# Patient Record
Sex: FEMALE | Race: WHITE | Employment: FULL TIME | URBAN - METROPOLITAN AREA
[De-identification: names, ages, dates, MRNs, and addresses within clinical notes are randomized per-mention and may not be internally consistent; named-entity substitution may affect disease eponyms.]

---

## 2017-03-06 ENCOUNTER — HOSPITAL ENCOUNTER (EMERGENCY)
Age: 57
Discharge: HOME | End: 2017-03-06
Attending: INTERNAL MEDICINE
Payer: COMMERCIAL

## 2017-03-06 ENCOUNTER — APPOINTMENT (OUTPATIENT)
Dept: GENERAL RADIOLOGY | Age: 57
End: 2017-03-06
Attending: INTERNAL MEDICINE
Payer: COMMERCIAL

## 2017-03-06 VITALS
DIASTOLIC BLOOD PRESSURE: 65 MMHG | OXYGEN SATURATION: 99 % | WEIGHT: 130 LBS | HEIGHT: 67 IN | BODY MASS INDEX: 20.4 KG/M2 | TEMPERATURE: 97.4 F | HEART RATE: 61 BPM | RESPIRATION RATE: 16 BRPM | SYSTOLIC BLOOD PRESSURE: 105 MMHG

## 2017-03-06 DIAGNOSIS — R07.9 CHEST PAIN, UNSPECIFIED TYPE: Primary | ICD-10-CM

## 2017-03-06 LAB
ALBUMIN SERPL BCP-MCNC: 4 G/DL (ref 3.4–5)
ALBUMIN/GLOB SERPL: 1.2 {RATIO} (ref 0.8–1.7)
ALP SERPL-CCNC: 104 U/L (ref 45–117)
ALT SERPL-CCNC: 34 U/L (ref 13–56)
ANION GAP BLD CALC-SCNC: 6 MMOL/L (ref 3–18)
APPEARANCE UR: ABNORMAL
AST SERPL W P-5'-P-CCNC: 22 U/L (ref 15–37)
BASOPHILS # BLD AUTO: 0 K/UL (ref 0–0.06)
BASOPHILS # BLD: 0 % (ref 0–2)
BILIRUB SERPL-MCNC: 0.4 MG/DL (ref 0.2–1)
BILIRUB UR QL: NEGATIVE
BUN SERPL-MCNC: 15 MG/DL (ref 7–18)
BUN/CREAT SERPL: 18 (ref 12–20)
CALCIUM SERPL-MCNC: 9.1 MG/DL (ref 8.5–10.1)
CHLORIDE SERPL-SCNC: 104 MMOL/L (ref 100–108)
CK MB CFR SERPL CALC: 1.3 % (ref 0–4)
CK MB CFR SERPL CALC: NORMAL % (ref 0–4)
CK MB SERPL-MCNC: 1.2 NG/ML (ref 5–25)
CK MB SERPL-MCNC: <1 NG/ML (ref 5–25)
CK SERPL-CCNC: 81 U/L (ref 26–192)
CK SERPL-CCNC: 93 U/L (ref 26–192)
CO2 SERPL-SCNC: 34 MMOL/L (ref 21–32)
COLOR UR: ABNORMAL
CREAT SERPL-MCNC: 0.85 MG/DL (ref 0.6–1.3)
D DIMER PPP FEU-MCNC: 0.31 UG/ML(FEU)
DIFFERENTIAL METHOD BLD: NORMAL
EOSINOPHIL # BLD: 0.1 K/UL (ref 0–0.4)
EOSINOPHIL NFR BLD: 1 % (ref 0–5)
ERYTHROCYTE [DISTWIDTH] IN BLOOD BY AUTOMATED COUNT: 13 % (ref 11.6–14.5)
GLOBULIN SER CALC-MCNC: 3.4 G/DL (ref 2–4)
GLUCOSE SERPL-MCNC: 82 MG/DL (ref 74–99)
GLUCOSE UR STRIP.AUTO-MCNC: NEGATIVE MG/DL
HCT VFR BLD AUTO: 42.8 % (ref 35–45)
HGB BLD-MCNC: 14.3 G/DL (ref 12–16)
HGB UR QL STRIP: NEGATIVE
KETONES UR QL STRIP.AUTO: NEGATIVE MG/DL
LEUKOCYTE ESTERASE UR QL STRIP.AUTO: NEGATIVE
LYMPHOCYTES # BLD AUTO: 31 % (ref 21–52)
LYMPHOCYTES # BLD: 2.2 K/UL (ref 0.9–3.6)
MCH RBC QN AUTO: 30.7 PG (ref 24–34)
MCHC RBC AUTO-ENTMCNC: 33.4 G/DL (ref 31–37)
MCV RBC AUTO: 91.8 FL (ref 74–97)
MONOCYTES # BLD: 0.3 K/UL (ref 0.05–1.2)
MONOCYTES NFR BLD AUTO: 4 % (ref 3–10)
NEUTS SEG # BLD: 4.5 K/UL (ref 1.8–8)
NEUTS SEG NFR BLD AUTO: 64 % (ref 40–73)
NITRITE UR QL STRIP.AUTO: NEGATIVE
PH UR STRIP: 8.5 [PH] (ref 5–8)
PLATELET # BLD AUTO: 256 K/UL (ref 135–420)
PMV BLD AUTO: 10 FL (ref 9.2–11.8)
POTASSIUM SERPL-SCNC: 3.6 MMOL/L (ref 3.5–5.5)
PROT SERPL-MCNC: 7.4 G/DL (ref 6.4–8.2)
PROT UR STRIP-MCNC: NEGATIVE MG/DL
RBC # BLD AUTO: 4.66 M/UL (ref 4.2–5.3)
SODIUM SERPL-SCNC: 144 MMOL/L (ref 136–145)
SP GR UR REFRACTOMETRY: 1.01 (ref 1–1.03)
TROPONIN I SERPL-MCNC: <0.02 NG/ML (ref 0–0.06)
TROPONIN I SERPL-MCNC: <0.02 NG/ML (ref 0–0.06)
UROBILINOGEN UR QL STRIP.AUTO: 0.2 EU/DL (ref 0.2–1)
WBC # BLD AUTO: 7.1 K/UL (ref 4.6–13.2)

## 2017-03-06 PROCEDURE — 85025 COMPLETE CBC W/AUTO DIFF WBC: CPT | Performed by: INTERNAL MEDICINE

## 2017-03-06 PROCEDURE — 71010 XR CHEST PORT: CPT

## 2017-03-06 PROCEDURE — 93005 ELECTROCARDIOGRAM TRACING: CPT

## 2017-03-06 PROCEDURE — 80053 COMPREHEN METABOLIC PANEL: CPT | Performed by: INTERNAL MEDICINE

## 2017-03-06 PROCEDURE — 99285 EMERGENCY DEPT VISIT HI MDM: CPT

## 2017-03-06 PROCEDURE — 85379 FIBRIN DEGRADATION QUANT: CPT | Performed by: PHYSICIAN ASSISTANT

## 2017-03-06 PROCEDURE — 82550 ASSAY OF CK (CPK): CPT | Performed by: INTERNAL MEDICINE

## 2017-03-06 PROCEDURE — 81003 URINALYSIS AUTO W/O SCOPE: CPT | Performed by: PHYSICIAN ASSISTANT

## 2017-03-06 RX ORDER — RANITIDINE HCL 75 MG
75 TABLET ORAL 2 TIMES DAILY
Status: ON HOLD | COMMUNITY
End: 2019-11-26

## 2017-03-06 RX ORDER — CEPHALEXIN 500 MG/1
500 CAPSULE ORAL 4 TIMES DAILY
Status: ON HOLD | COMMUNITY
End: 2017-12-05

## 2017-03-06 RX ORDER — LANOLIN ALCOHOL/MO/W.PET/CERES
1000 CREAM (GRAM) TOPICAL DAILY
COMMUNITY

## 2017-03-06 NOTE — ED NOTES
I have reviewed discharge instructions with the patient. The patient verbalized understanding.   Patient armband removed and shredded, no scripts given

## 2017-03-06 NOTE — ED TRIAGE NOTES
Pt states chest pain since Dec. Pt has been seen by a cardiologist, Dr. Leia Denton in Jan. Pt states test done and negative results, pt states she has had cath with stents, pt states pain is persistent. C/o sob, denies diaphoresis, nausea   Sepsis Screening completed    (  )Patient meets SIRS criteria. (  x)Patient does not meet SIRS criteria.       SIRS Criteria is achieved when two or more of the following are present   Temperature < 96.8°F (36°C) or > 100.9°F (38.3°C)   Heart Rate > 90 beats per minute   Respiratory Rate > 20 breaths per minute   WBC count > 12,000 or <4,000 or > 10% bands

## 2017-03-06 NOTE — DISCHARGE INSTRUCTIONS
Chest Pain: Care Instructions  Your Care Instructions  There are many things that can cause chest pain. Some are not serious and will get better on their own in a few days. But some kinds of chest pain need more testing and treatment. Your doctor may have recommended a follow-up visit in the next 8 to 12 hours. If you are not getting better, you may need more tests or treatment. Even though your doctor has released you, you still need to watch for any problems. The doctor carefully checked you, but sometimes problems can develop later. If you have new symptoms or if your symptoms do not get better, get medical care right away. If you have worse or different chest pain or pressure that lasts more than 5 minutes or you passed out (lost consciousness), call 911 or seek other emergency help right away. A medical visit is only one step in your treatment. Even if you feel better, you still need to do what your doctor recommends, such as going to all suggested follow-up appointments and taking medicines exactly as directed. This will help you recover and help prevent future problems. How can you care for yourself at home? · Rest until you feel better. · Take your medicine exactly as prescribed. Call your doctor if you think you are having a problem with your medicine. · Do not drive after taking a prescription pain medicine. When should you call for help? Call 911 if:  · You passed out (lost consciousness). · You have severe difficulty breathing. · You have symptoms of a heart attack. These may include:  ¨ Chest pain or pressure, or a strange feeling in your chest.  ¨ Sweating. ¨ Shortness of breath. ¨ Nausea or vomiting. ¨ Pain, pressure, or a strange feeling in your back, neck, jaw, or upper belly or in one or both shoulders or arms. ¨ Lightheadedness or sudden weakness. ¨ A fast or irregular heartbeat.   After you call 911, the  may tell you to chew 1 adult-strength or 2 to 4 low-dose aspirin. Wait for an ambulance. Do not try to drive yourself. Call your doctor today if:  · You have any trouble breathing. · Your chest pain gets worse. · You are dizzy or lightheaded, or you feel like you may faint. · You are not getting better as expected. · You are having new or different chest pain. Where can you learn more? Go to http://virla-anna.info/. Enter A120 in the search box to learn more about \"Chest Pain: Care Instructions. \"  Current as of: May 27, 2016  Content Version: 11.1  © 8381-4306 [employer]. Care instructions adapted under license by [employer] (which disclaims liability or warranty for this information). If you have questions about a medical condition or this instruction, always ask your healthcare professional. [address] any warranty or liability for your use of this information.

## 2017-03-06 NOTE — ED PROVIDER NOTES
[address]  EMERGENCY DEPARTMENT HISTORY AND PHYSICAL EXAM       Date: 3/6/2017   Patient Name: Lisandra Berman   YOB: 1960  Medical Record Number: [id number]    History of Presenting Illness     Chief Complaint   Patient presents with    Chest Pain        History Provided By:  patient    Additional History:   12:28 PM   Lisandra Berman is a 49 y.o. female with a hx of GERD, depression, and CAD who presents to the emergency department c/o intermittent generalized chest pain that intermittently radiates into her arms (no specific pattern; not always the same; episodes last a few minutes) onset 4 months ago. Pain is exacerbated by deep breathing. Associated symptoms include SOB and nausea. She states she is followed by Devi Bourgeois MD (Cardiologist) and had a nuclear stress test and cardiogram 3 months ago (negative, per pt). Pt reports she has an appointment in 4 days with her cardiologist. PMHx of cardiac stents (60% blocked in 2011 by Dr. Desirae Rodgers). She states she has relief with NTG and has tried Tylenol. Pt also c/o left lower back pain s/p hitting it on a marble cabinet. She states she was evaluated for this at an urgent care, had an x-ray done, and it was negative. FHx of MI (mother at 45 y/o). Pt denies cough, congestion, fever, any urinary sx, hx of DM, hx of HTN, and any other symptoms or complaints.       Primary Care Provider: Paige Pedro MD   Specialist:    Past History     Past Medical History:   Past Medical History:   Diagnosis Date    CAD (coronary artery disease)     Chest pain, unspecified 9/9/2012    Coronary atherosclerosis of native coronary artery     Depression     Epistaxis 12/11/2012    Fatigue 12/11/2012    GERD (gastroesophageal reflux disease)     Ill-defined condition     allergies    Ill-defined condition     anal mole and cyst    Left arm pain 8/7/2014    Nausea & vomiting     Other and unspecified hyperlipidemia 6/1/2011  Other and unspecified hyperlipidemia     Ovarian cyst         Past Surgical History:   Past Surgical History:   Procedure Laterality Date    HX APPENDECTOMY      HX BREAST BIOPSY      right cyst    HX CHOLECYSTECTOMY      HX CORONARY STENT PLACEMENT  05/05/11    stent mid right coronary artery stenosis    HX GYN      dysplasia    HX HEART CATHETERIZATION  05/11    stent placement mid coronary artery    HX HYSTERECTOMY      HX KNEE ARTHROSCOPY      left    HX OOPHORECTOMY      HX TONSILLECTOMY          Family History:   Family History   Problem Relation Age of Onset    Coronary Artery Disease Mother 46    Heart Attack Mother 46        Social History:   Social History   Substance Use Topics    Smoking status: Former Smoker     Packs/day: 0.80     Years: 30.00     Types: Cigarettes     Quit date: 5/9/2011    Smokeless tobacco: Never Used    Alcohol use No      Comment: ross yearly        Allergies: Allergies   Allergen Reactions    Zocor [Simvastatin] Hives    Biaxin [Clarithromycin] Other (comments)     [place]      Ciprofloxacin Rash    Pcn [Penicillins] Rash    Plavix [Clopidogrel] Rash    Wellbutrin [Bupropion Hcl] Other (comments)     Whelps            Review of Systems   Review of Systems   Constitutional: Negative for fever. HENT: Negative for congestion. Respiratory: Positive for shortness of breath. Negative for cough. Cardiovascular: Positive for chest pain. Gastrointestinal: Positive for nausea. Genitourinary: Negative. Musculoskeletal: Positive for back pain (lower left). All other systems reviewed and are negative. Physical Exam  Vitals:    03/06/17 1055 03/06/17 1236 03/06/17 1400   BP: 114/64 110/70    Pulse: 67 (!) 56 (!) 55   Resp: 16 10 15   Temp: 97.4 °F (36.3 °C)     SpO2: 98% 100% 97%   Weight: 59 kg (130 lb)     Height: 5' 7\" (1.702 m)         Physical Exam   Constitutional: She is oriented to person, place, and time.  She appears well-developed and well-nourished. No distress. HENT:   Head: Normocephalic and atraumatic. Eyes: Conjunctivae and EOM are normal. Pupils are equal, round, and reactive to light. Neck: Normal range of motion. Neck supple. Cardiovascular: Normal rate and regular rhythm. Pulmonary/Chest: Effort normal and breath sounds normal. She has no wheezes. Abdominal: Soft. Bowel sounds are normal. She exhibits no distension. There is no tenderness. There is no rebound, no guarding and no CVA tenderness. Musculoskeletal: Normal range of motion. Neurological: She is alert and oriented to person, place, and time. Skin: Skin is warm and dry. No abrasion, no burn and no rash noted. Psychiatric: She has a normal mood and affect. Her behavior is normal.   Nursing note and vitals reviewed. Diagnostic Study Results     Labs -      Recent Results (from the past 12 hour(s))   EKG, 12 LEAD, INITIAL    Collection Time: 03/06/17 11:02 AM   Result Value Ref Range    Ventricular Rate 61 BPM    Atrial Rate 61 BPM    P-R Interval 158 ms    QRS Duration 84 ms    Q-T Interval 402 ms    QTC Calculation (Bezet) 404 ms    Calculated P Axis 71 degrees    Calculated R Axis 78 degrees    Calculated T Axis 72 degrees    Diagnosis       Normal sinus rhythm  Possible Left atrial enlargement  Cannot rule out Anterior infarct , age undetermined  Abnormal ECG  When compared with ECG of 24-JUL-2014 10:57,  No significant change was found     CBC WITH AUTOMATED DIFF    Collection Time: 03/06/17 11:49 AM   Result Value Ref Range    WBC 7.1 4.6 - 13.2 K/uL    RBC 4.66 4.20 - 5.30 M/uL    HGB 14.3 12.0 - 16.0 g/dL    HCT 42.8 35.0 - 45.0 %    MCV 91.8 74.0 - 97.0 FL    MCH 30.7 24.0 - 34.0 PG    MCHC 33.4 31.0 - 37.0 g/dL    RDW 13.0 11.6 - 14.5 %    PLATELET 452 262 - 401 K/uL    MPV 10.0 9.2 - 11.8 FL    NEUTROPHILS 64 40 - 73 %    LYMPHOCYTES 31 21 - 52 %    MONOCYTES 4 3 - 10 %    EOSINOPHILS 1 0 - 5 %    BASOPHILS 0 0 - 2 %    ABS.  NEUTROPHILS 4.5 1.8 - 8.0 K/UL    ABS. LYMPHOCYTES 2.2 0.9 - 3.6 K/UL    ABS. MONOCYTES 0.3 0.05 - 1.2 K/UL    ABS. EOSINOPHILS 0.1 0.0 - 0.4 K/UL    ABS. BASOPHILS 0.0 0.0 - 0.06 K/UL    DF AUTOMATED     METABOLIC PANEL, COMPREHENSIVE    Collection Time: 03/06/17 11:49 AM   Result Value Ref Range    Sodium 144 136 - 145 mmol/L    Potassium 3.6 3.5 - 5.5 mmol/L    Chloride 104 100 - 108 mmol/L    CO2 34 (H) 21 - 32 mmol/L    Anion gap 6 3.0 - 18 mmol/L    Glucose 82 74 - 99 mg/dL    BUN 15 7.0 - 18 MG/DL    Creatinine 0.85 0.6 - 1.3 MG/DL    BUN/Creatinine ratio 18 12 - 20      GFR est AA >60 >60 ml/min/1.73m2    GFR est non-AA >60 >60 ml/min/1.73m2    Calcium 9.1 8.5 - 10.1 MG/DL    Bilirubin, total 0.4 0.2 - 1.0 MG/DL    ALT (SGPT) 34 13 - 56 U/L    AST (SGOT) 22 15 - 37 U/L    Alk.  phosphatase 104 45 - 117 U/L    Protein, total 7.4 6.4 - 8.2 g/dL    Albumin 4.0 3.4 - 5.0 g/dL    Globulin 3.4 2.0 - 4.0 g/dL    A-G Ratio 1.2 0.8 - 1.7     CARDIAC PANEL,(CK, CKMB & TROPONIN)    Collection Time: 03/06/17 11:49 AM   Result Value Ref Range    CK 93 26 - 192 U/L    CK - MB 1.2 <3.6 ng/ml    CK-MB Index 1.3 0.0 - 4.0 %    Troponin-I, Qt. <0.02 0.00 - 0.06 NG/ML   D DIMER    Collection Time: 03/06/17 11:49 AM   Result Value Ref Range    D DIMER 0.31 <0.46 ug/ml(FEU)   URINALYSIS W/ RFLX MICROSCOPIC    Collection Time: 03/06/17 12:50 PM   Result Value Ref Range    Color ORANGE     Appearance CLOUDY      Specific gravity 1.012 1.005 - 1.030      pH (UA) 8.5 (H) 5.0 - 8.0      Protein NEGATIVE  NEG mg/dL    Glucose NEGATIVE  NEG mg/dL    Ketone NEGATIVE  NEG mg/dL    Bilirubin NEGATIVE  NEG      Blood NEGATIVE  NEG      Urobilinogen 0.2 0.2 - 1.0 EU/dL    Nitrites NEGATIVE  NEG      Leukocyte Esterase NEGATIVE  NEG     CARDIAC PANEL,(CK, CKMB & TROPONIN)    Collection Time: 03/06/17  2:57 PM   Result Value Ref Range    CK 81 26 - 192 U/L    CK - MB <1.0 <3.6 ng/ml    CK-MB Index Cannot be calulated 0.0 - 4.0 %    Troponin-I, Qt. <0.02 0.00 - 0.06 NG/ML       Radiologic Studies -  XR CHEST PORT   Final Result   Impression:     No acute cardiopulmonary disease. As read by the radiologist.            Medical Decision Making   I am the first provider for this patient. I reviewed the vital signs, available nursing notes, past medical history, past surgical history, family history and social history. Vital Signs-Reviewed the patient's vital signs. Patient Vitals for the past 12 hrs:   Temp Pulse Resp BP SpO2   03/06/17 1400 - (!) 55 15 - 97 %   03/06/17 1236 - (!) 56 10 110/70 100 %   03/06/17 1055 97.4 °F (36.3 °C) 67 16 114/64 98 %       Pulse Oximetry Analysis - Normal 98% on RA     Cardiac Monitor:   Rate: 52 bpm  Rhythm: Sinus bradycardia     EKG interpretation: (Preliminary)  11:02  NSR, 61 bpm, negative STEMI, possible left atrial enlargement, cannot rule out anterior infarct, age undetermined  EKG read by Leopold Rouse, MD     Old Medical Records: Nursing notes. Provider Notes:   50 y/o female c/o intermittent chest pain that is pleuritic in nature and is worse with inspiration with occassional SOB. She is not tachycardic or hypoxic and has no other risk factors for PE. Will obtain D-Dimer. Low pre-test probability. Medications Given in the ED:  Medications - No data to display     PROGRESS NOTE:  12:28 PM   Initial assessment performed. CONSULT NOTE:   2:51 PM   Fredo Nguyễn PA-C  spoke with Baylee Roy MD via telephone consult  Specialty: Cardiology  Discussed pt's hx, disposition, available diagnostic, and imaging results. Reviewed care plans. Consulting physician agrees with plans as outlined. He suggests repeat cardiac panel now and if normal, pt may be discharged and seen in his office tomorrow. Discharge Note:  3:40 PM   Pt has been reexamined. Patient has no new complaints, changes, or physical findings. Care plan outlined and precautions discussed. Results were reviewed with the patient.  All medications were reviewed with the patient; will d/c home. All of pt's questions and concerns were addressed. Patient was instructed and agrees to follow up with cardiology, as well as to return to the ED upon further deterioration. Patient is ready to go home. Diagnosis   Clinical Impression:   1. Chest pain, unspecified type         Discussion:    Follow-up Information     Follow up With Details Comments [address], MD Call Follow up with cardiology [address]  [address] [facility] EMERGENCY DEPT  As needed, If symptoms worsen [address] [phone]  [phone]          Current Discharge Medication List      CONTINUE these medications which have NOT CHANGED    Details   raNITIdine (ZANTAC 75) 75 mg tablet Take 75 mg by mouth two (2) times a day. cyanocobalamin 1,000 mcg tablet Take 1,000 mcg by mouth daily. cephALEXin (KEFLEX) 500 mg capsule Take 500 mg by mouth four (4) times daily. furosemide (LASIX) 20 mg tablet take 1 tablet by mouth once daily  Qty: 90 tablet, Refills: 3      atenolol (TENORMIN) 25 mg tablet TAKE 1/2 TABLET BY MOUTH EVERY DAY  Qty: 90 tablet, Refills: 3      nitroglycerin (NITROSTAT) 0.4 mg SL tablet 1 tablet by SubLINGual route every five (5) minutes as needed for Chest Pain (total of 3 tabs for one episode of pain). Qty: 75 tablet, Refills: 3      rosuvastatin (CRESTOR) 10 mg tablet Take 1 tablet by mouth daily. Qty: 90 tablet, Refills: 3      Cholecalciferol, Vitamin D3, 50,000 unit cap Take  by mouth every Monday. montelukast (SINGULAIR) 10 mg tablet Take 10 mg by mouth daily. desloratadine (CLARINEX) 5 mg disintegrating tablet Take 5 mg by mouth daily. azelastine (ASTELIN) 137 mcg nasal spray 1 Spray two (2) times a day. Use in each nostril as directed       LORazepam (ATIVAN) 0.5 mg tablet Take  by mouth nightly.       potassium chloride SR (KLOR-CON 10) 10 mEq tablet Take 10 mEq by mouth daily. aspirin 81 mg tablet Take 81 mg by mouth daily. _______________________________   Attestations: This note is prepared by Jovani Hernandez, acting as a Scribe for Fabien Stewart PA-C on 12:27 PM on 3/6/2017 . Fabien Stewart PA-C: The scribe's documentation has been prepared under my direction and personally reviewed by me in its entirety.   _______________________________

## 2017-03-16 LAB
ATRIAL RATE: 61 BPM
CALCULATED P AXIS, ECG09: 71 DEGREES
CALCULATED R AXIS, ECG10: 78 DEGREES
CALCULATED T AXIS, ECG11: 72 DEGREES
DIAGNOSIS, 93000: NORMAL
P-R INTERVAL, ECG05: 158 MS
Q-T INTERVAL, ECG07: 402 MS
QRS DURATION, ECG06: 84 MS
QTC CALCULATION (BEZET), ECG08: 404 MS
VENTRICULAR RATE, ECG03: 61 BPM

## 2017-12-01 ENCOUNTER — HOSPITAL ENCOUNTER (OUTPATIENT)
Dept: LAB | Age: 57
Discharge: HOME | End: 2017-12-01
Payer: COMMERCIAL

## 2017-12-01 LAB
ALBUMIN SERPL-MCNC: 3.7 G/DL (ref 3.4–5)
ALBUMIN/GLOB SERPL: 1.2 {RATIO} (ref 0.8–1.7)
ALP SERPL-CCNC: 112 U/L (ref 45–117)
ALT SERPL-CCNC: 30 U/L (ref 13–56)
ANION GAP SERPL CALC-SCNC: 8 MMOL/L (ref 3–18)
AST SERPL-CCNC: 19 U/L (ref 15–37)
BASOPHILS # BLD: 0.1 K/UL (ref 0–0.06)
BASOPHILS NFR BLD: 1 % (ref 0–2)
BILIRUB SERPL-MCNC: 0.3 MG/DL (ref 0.2–1)
BUN SERPL-MCNC: 16 MG/DL (ref 7–18)
BUN/CREAT SERPL: 20 (ref 12–20)
CALCIUM SERPL-MCNC: 9.7 MG/DL (ref 8.5–10.1)
CHLORIDE SERPL-SCNC: 104 MMOL/L (ref 100–108)
CO2 SERPL-SCNC: 32 MMOL/L (ref 21–32)
CREAT SERPL-MCNC: 0.82 MG/DL (ref 0.6–1.3)
DIFFERENTIAL METHOD BLD: ABNORMAL
EOSINOPHIL # BLD: 2.1 K/UL (ref 0–0.4)
EOSINOPHIL NFR BLD: 23 % (ref 0–5)
ERYTHROCYTE [DISTWIDTH] IN BLOOD BY AUTOMATED COUNT: 13.3 % (ref 11.6–14.5)
GLOBULIN SER CALC-MCNC: 3.1 G/DL (ref 2–4)
GLUCOSE SERPL-MCNC: 88 MG/DL (ref 74–99)
HCT VFR BLD AUTO: 44.1 % (ref 35–45)
HGB BLD-MCNC: 14.8 G/DL (ref 12–16)
INR PPP: 1 (ref 0.8–1.2)
LYMPHOCYTES # BLD: 2.1 K/UL (ref 0.9–3.6)
LYMPHOCYTES NFR BLD: 23 % (ref 21–52)
MCH RBC QN AUTO: 30.6 PG (ref 24–34)
MCHC RBC AUTO-ENTMCNC: 33.6 G/DL (ref 31–37)
MCV RBC AUTO: 91.1 FL (ref 74–97)
MONOCYTES # BLD: 0.3 K/UL (ref 0.05–1.2)
MONOCYTES NFR BLD: 3 % (ref 3–10)
NEUTS BAND NFR BLD MANUAL: 1 %
NEUTS SEG # BLD: 4.5 K/UL (ref 1.8–8)
NEUTS SEG NFR BLD: 49 % (ref 40–73)
PLATELET # BLD AUTO: 264 K/UL (ref 135–420)
PMV BLD AUTO: 10.8 FL (ref 9.2–11.8)
POTASSIUM SERPL-SCNC: 3.9 MMOL/L (ref 3.5–5.5)
PROT SERPL-MCNC: 6.8 G/DL (ref 6.4–8.2)
PROTHROMBIN TIME: 12.6 SEC (ref 11.5–15.2)
RBC # BLD AUTO: 4.84 M/UL (ref 4.2–5.3)
RBC MORPH BLD: ABNORMAL
SODIUM SERPL-SCNC: 144 MMOL/L (ref 136–145)
WBC # BLD AUTO: 9.2 K/UL (ref 4.6–13.2)
WBC MORPH BLD: ABNORMAL

## 2017-12-01 PROCEDURE — 80053 COMPREHEN METABOLIC PANEL: CPT | Performed by: INTERNAL MEDICINE

## 2017-12-01 PROCEDURE — 85025 COMPLETE CBC W/AUTO DIFF WBC: CPT | Performed by: INTERNAL MEDICINE

## 2017-12-01 PROCEDURE — 85610 PROTHROMBIN TIME: CPT | Performed by: INTERNAL MEDICINE

## 2017-12-01 PROCEDURE — 36415 COLL VENOUS BLD VENIPUNCTURE: CPT | Performed by: INTERNAL MEDICINE

## 2017-12-04 RX ORDER — DOCUSATE SODIUM 100 MG/1
100 CAPSULE, LIQUID FILLED ORAL 2 TIMES DAILY
COMMUNITY
End: 2019-03-31

## 2017-12-04 RX ORDER — FLUTICASONE PROPIONATE 50 MCG
2 SPRAY, SUSPENSION (ML) NASAL DAILY
COMMUNITY
End: 2021-06-12

## 2017-12-05 ENCOUNTER — HOSPITAL ENCOUNTER (OUTPATIENT)
Dept: CARDIAC CATH/INVASIVE PROCEDURES | Age: 57
Discharge: HOME | End: 2017-12-05
Attending: INTERNAL MEDICINE | Admitting: INTERNAL MEDICINE
Payer: COMMERCIAL

## 2017-12-05 VITALS
BODY MASS INDEX: 20.33 KG/M2 | HEIGHT: 67 IN | SYSTOLIC BLOOD PRESSURE: 104 MMHG | OXYGEN SATURATION: 100 % | WEIGHT: 129.5 LBS | HEART RATE: 55 BPM | RESPIRATION RATE: 18 BRPM | DIASTOLIC BLOOD PRESSURE: 54 MMHG | TEMPERATURE: 98.2 F

## 2017-12-05 LAB
ACT BLD: 177 SECS (ref 79–138)
FAX TO INFO,FAXT: NORMAL
FAX TO NUMBER,FAXN: NORMAL

## 2017-12-05 PROCEDURE — 77030010221 CARDIAC CATHETERIZATION

## 2017-12-05 PROCEDURE — 74011636320 HC RX REV CODE- 636/320: Performed by: INTERNAL MEDICINE

## 2017-12-05 PROCEDURE — 74011000250 HC RX REV CODE- 250

## 2017-12-05 PROCEDURE — 74011250636 HC RX REV CODE- 250/636: Performed by: INTERNAL MEDICINE

## 2017-12-05 PROCEDURE — 85347 COAGULATION TIME ACTIVATED: CPT

## 2017-12-05 PROCEDURE — 74011000250 HC RX REV CODE- 250: Performed by: INTERNAL MEDICINE

## 2017-12-05 PROCEDURE — 74011250636 HC RX REV CODE- 250/636

## 2017-12-05 RX ORDER — HEPARIN SODIUM 200 [USP'U]/100ML
500 INJECTION, SOLUTION INTRAVENOUS
Status: DISCONTINUED | OUTPATIENT
Start: 2017-12-05 | End: 2017-12-05 | Stop reason: HOSPADM

## 2017-12-05 RX ORDER — FENTANYL CITRATE 50 UG/ML
INJECTION, SOLUTION INTRAMUSCULAR; INTRAVENOUS
Status: COMPLETED
Start: 2017-12-05 | End: 2017-12-05

## 2017-12-05 RX ORDER — SODIUM CHLORIDE 9 MG/ML
75 INJECTION, SOLUTION INTRAVENOUS CONTINUOUS
Status: DISCONTINUED | OUTPATIENT
Start: 2017-12-05 | End: 2017-12-05 | Stop reason: HOSPADM

## 2017-12-05 RX ORDER — ADENOSINE 3 MG/ML
140 INJECTION, SOLUTION INTRAVENOUS ONCE
Status: COMPLETED | OUTPATIENT
Start: 2017-12-05 | End: 2017-12-05

## 2017-12-05 RX ORDER — ADENOSINE 3 MG/ML
INJECTION, SOLUTION INTRAVENOUS
Status: DISCONTINUED
Start: 2017-12-05 | End: 2017-12-05 | Stop reason: HOSPADM

## 2017-12-05 RX ORDER — VERAPAMIL HYDROCHLORIDE 2.5 MG/ML
INJECTION, SOLUTION INTRAVENOUS
Status: DISCONTINUED
Start: 2017-12-05 | End: 2017-12-05 | Stop reason: HOSPADM

## 2017-12-05 RX ORDER — MIDAZOLAM HYDROCHLORIDE 1 MG/ML
.5-2 INJECTION, SOLUTION INTRAMUSCULAR; INTRAVENOUS
Status: DISCONTINUED | OUTPATIENT
Start: 2017-12-05 | End: 2017-12-05 | Stop reason: HOSPADM

## 2017-12-05 RX ORDER — HEPARIN SODIUM 1000 [USP'U]/ML
INJECTION, SOLUTION INTRAVENOUS; SUBCUTANEOUS
Status: COMPLETED
Start: 2017-12-05 | End: 2017-12-05

## 2017-12-05 RX ORDER — FENTANYL CITRATE 50 UG/ML
25-100 INJECTION, SOLUTION INTRAMUSCULAR; INTRAVENOUS
Status: DISCONTINUED | OUTPATIENT
Start: 2017-12-05 | End: 2017-12-05 | Stop reason: HOSPADM

## 2017-12-05 RX ORDER — HEPARIN SODIUM 200 [USP'U]/100ML
INJECTION, SOLUTION INTRAVENOUS
Status: COMPLETED
Start: 2017-12-05 | End: 2017-12-05

## 2017-12-05 RX ORDER — SODIUM CHLORIDE 9 MG/ML
75 INJECTION, SOLUTION INTRAVENOUS CONTINUOUS
Status: DISPENSED | OUTPATIENT
Start: 2017-12-05 | End: 2017-12-05

## 2017-12-05 RX ORDER — LIDOCAINE HYDROCHLORIDE 10 MG/ML
3-20 INJECTION INFILTRATION; PERINEURAL
Status: DISCONTINUED | OUTPATIENT
Start: 2017-12-05 | End: 2017-12-05 | Stop reason: HOSPADM

## 2017-12-05 RX ORDER — HEPARIN SODIUM 1000 [USP'U]/ML
1000-4000 INJECTION, SOLUTION INTRAVENOUS; SUBCUTANEOUS
Status: DISCONTINUED | OUTPATIENT
Start: 2017-12-05 | End: 2017-12-05 | Stop reason: HOSPADM

## 2017-12-05 RX ORDER — EPTIFIBATIDE 0.75 MG/ML
2 INJECTION, SOLUTION INTRAVENOUS CONTINUOUS
Status: DISCONTINUED | OUTPATIENT
Start: 2017-12-05 | End: 2017-12-05 | Stop reason: HOSPADM

## 2017-12-05 RX ORDER — SODIUM CHLORIDE 0.9 % (FLUSH) 0.9 %
5-10 SYRINGE (ML) INJECTION AS NEEDED
Status: DISCONTINUED | OUTPATIENT
Start: 2017-12-05 | End: 2017-12-05 | Stop reason: HOSPADM

## 2017-12-05 RX ORDER — SODIUM CHLORIDE 0.9 % (FLUSH) 0.9 %
5-10 SYRINGE (ML) INJECTION EVERY 8 HOURS
Status: DISCONTINUED | OUTPATIENT
Start: 2017-12-05 | End: 2017-12-05 | Stop reason: HOSPADM

## 2017-12-05 RX ORDER — MIDAZOLAM HYDROCHLORIDE 1 MG/ML
INJECTION, SOLUTION INTRAMUSCULAR; INTRAVENOUS
Status: COMPLETED
Start: 2017-12-05 | End: 2017-12-05

## 2017-12-05 RX ORDER — EPTIFIBATIDE 2 MG/ML
180 INJECTION, SOLUTION INTRAVENOUS
Status: DISCONTINUED | OUTPATIENT
Start: 2017-12-05 | End: 2017-12-05 | Stop reason: HOSPADM

## 2017-12-05 RX ORDER — LIDOCAINE HYDROCHLORIDE 10 MG/ML
INJECTION INFILTRATION; PERINEURAL
Status: COMPLETED
Start: 2017-12-05 | End: 2017-12-05

## 2017-12-05 RX ADMIN — HEPARIN SODIUM 1000 UNITS: 1000 INJECTION, SOLUTION INTRAVENOUS; SUBCUTANEOUS at 13:44

## 2017-12-05 RX ADMIN — MIDAZOLAM HYDROCHLORIDE 1 MG: 1 INJECTION, SOLUTION INTRAMUSCULAR; INTRAVENOUS at 13:06

## 2017-12-05 RX ADMIN — FENTANYL CITRATE 50 MCG: 50 INJECTION, SOLUTION INTRAMUSCULAR; INTRAVENOUS at 13:06

## 2017-12-05 RX ADMIN — HEPARIN SODIUM 2000 UNITS: 1000 INJECTION, SOLUTION INTRAVENOUS; SUBCUTANEOUS at 14:03

## 2017-12-05 RX ADMIN — FENTANYL CITRATE 50 MCG: 50 INJECTION, SOLUTION INTRAMUSCULAR; INTRAVENOUS at 13:26

## 2017-12-05 RX ADMIN — SODIUM CHLORIDE 75 ML/HR: 900 INJECTION, SOLUTION INTRAVENOUS at 11:28

## 2017-12-05 RX ADMIN — ADENOSINE 8.22 MG/MIN: 3 INJECTION, SOLUTION INTRAVENOUS at 14:01

## 2017-12-05 RX ADMIN — HEPARIN SODIUM 2000 UNITS: 200 INJECTION, SOLUTION INTRAVENOUS at 13:28

## 2017-12-05 RX ADMIN — HEPARIN SODIUM 4000 UNITS: 1000 INJECTION, SOLUTION INTRAVENOUS; SUBCUTANEOUS at 13:00

## 2017-12-05 RX ADMIN — FENTANYL CITRATE 100 MCG: 50 INJECTION, SOLUTION INTRAMUSCULAR; INTRAVENOUS at 13:00

## 2017-12-05 RX ADMIN — HEPARIN SODIUM 3000 UNITS: 1000 INJECTION, SOLUTION INTRAVENOUS; SUBCUTANEOUS at 13:27

## 2017-12-05 RX ADMIN — VERAPAMIL HYDROCHLORIDE 3 ML: 2.5 INJECTION INTRAVENOUS at 13:00

## 2017-12-05 RX ADMIN — LIDOCAINE HYDROCHLORIDE 3 ML: 10 INJECTION, SOLUTION INFILTRATION; PERINEURAL at 13:00

## 2017-12-05 RX ADMIN — IOPAMIDOL 130 ML: 612 INJECTION, SOLUTION INTRAVENOUS at 14:11

## 2017-12-05 RX ADMIN — MIDAZOLAM HYDROCHLORIDE 2 MG: 1 INJECTION, SOLUTION INTRAMUSCULAR; INTRAVENOUS at 13:01

## 2017-12-05 RX ADMIN — SODIUM CHLORIDE 75 ML/HR: 900 INJECTION, SOLUTION INTRAVENOUS at 13:23

## 2017-12-05 RX ADMIN — MIDAZOLAM HYDROCHLORIDE 1 MG: 1 INJECTION, SOLUTION INTRAMUSCULAR; INTRAVENOUS at 13:27

## 2017-12-05 RX ADMIN — LIDOCAINE HYDROCHLORIDE 3 ML: 10 INJECTION INFILTRATION; PERINEURAL at 13:00

## 2017-12-05 NOTE — IP AVS SNAPSHOT
Linda Del Angel 
 
 
 [address] [address] 
[phone] Patient: Marilyn Moreno MRN: [id number] Gigi Erickson About your hospitalization You were admitted on:  December 5, 2017 You last received care in the:  [address] You were discharged on:  December 5, 2017 Why you were hospitalized Your primary diagnosis was:  Not on File Things You Need To Do (next 8 weeks) Follow up with Nadege Meyers MD  
  
Phone:  [phone] Where:  [address] [facility], [address] [address] Follow up with Hebert Saenz MD in 2 week(s)  
follow up as instructed Phone:  [phone] Where:  [address] [facility], [address] [facility], [address] [facility] Discharge Orders None A check alba indicates which time of day the medication should be taken. My Medications TAKE these medications as instructed Instructions Each Dose to Equal  
 Morning Noon Evening Bedtime  
 aspirin 81 mg tablet Your last dose was: Your next dose is: Take 81 mg by mouth daily. 81 mg ASTELIN 137 mcg (0.1 %) nasal spray Generic drug:  azelastine Your last dose was: Your next dose is:    
   
   
 1 Spray two (2) times a day. Use in each nostril as directed 1 Spray  
    
   
   
   
  
 atenolol 25 mg tablet Commonly known as:  TENORMIN Your last dose was: Your next dose is: TAKE 1/2 TABLET BY MOUTH EVERY DAY  
     
   
   
   
  
 ATIVAN 0.5 mg tablet Generic drug:  LORazepam  
   
Your last dose was: Your next dose is: Take  by mouth nightly. Cholecalciferol (Vitamin D3) 50,000 unit Cap Your last dose was: Your next dose is: Take  by mouth every Monday. CLARINEX 5 mg disintegrating tablet Generic drug:  desloratadine Your last dose was: Your next dose is: Take 5 mg by mouth daily. 5 mg  
    
   
   
   
  
 cyanocobalamin 1,000 mcg tablet Your last dose was: Your next dose is: Take 1,000 mcg by mouth daily. 1000 mcg  
    
   
   
   
  
 docusate sodium 100 mg capsule Commonly known as:  Dwight Ana Your last dose was: Your next dose is: Take 100 mg by mouth two (2) times a day. 100 mg FLONASE ALLERGY RELIEF 50 mcg/actuation nasal spray Generic drug:  fluticasone Your last dose was: Your next dose is: 2 Sprays by Both Nostrils route daily. 2 Spray  
    
   
   
   
  
 furosemide 20 mg tablet Commonly known as:  LASIX Your last dose was: Your next dose is:    
   
   
 take 1 tablet by mouth once daily  
     
   
   
   
  
 nitroglycerin 0.4 mg SL tablet Commonly known as:  NITROSTAT Your last dose was: Your next dose is:    
   
   
 1 tablet by SubLINGual route every five (5) minutes as needed for Chest Pain (total of 3 tabs for one episode of pain). 0.4 mg  
    
   
   
   
  
 potassium chloride SR 10 mEq tablet Commonly known as:  KLOR-CON 10 Your last dose was: Your next dose is: Take 10 mEq by mouth daily. 10 mEq  
    
   
   
   
  
 rosuvastatin 10 mg tablet Commonly known as:  CRESTOR Your last dose was: Your next dose is: Take 1 tablet by mouth daily. 10 mg  
    
   
   
   
  
 SINGULAIR 10 mg tablet Generic drug:  montelukast  
   
Your last dose was: Your next dose is: Take 10 mg by mouth daily. 10 mg  
    
   
   
   
  
 ZANTAC 75 75 mg tablet Generic drug:  raNITIdine Your last dose was: Your next dose is: Take 75 mg by mouth two (2) times a day. 75 mg Discharge Instructions Cardiac Catheterization/Angiography Discharge Instructions *Check the puncture site frequently for swelling or bleeding. If you see any bleeding, lie down and apply pressure over the area with a clean town or washcloth. Notify your doctor for any redness, swelling, drainage or oozing from the puncture site. Notify your doctor for any fever or chills. *If the leg or arm with the puncture becomes cold, numb or painful, call Dr Bermudez Most *Activity should be limited for the next 48 hours. Climb stairs as little as possible and avoid any stooping, bending or strenuous activity for 48 hours. No heavy lifting (anything over 10 pounds) for three days. *Do not drive for 48 hours. *You may resume your usual diet. Drink more fluids than usual. 
 
*Have a responsible person drive you home and stay with you for at least 24 hours after your heart catheterization/angiography. *You may remove the bandage from your {ARM/GROIN:73891} in 24 hours. You may shower in 24 hours. No tub baths, hot tubs or swimming for one week. Do not place any lotions, creams, powders, ointments over the puncture site for one week. You may place a clean band-aid over the puncture site each day for 5 days. Change this daily. Sedation for a Medical Procedure: Care Instructions Your Care Instructions For a minor procedure or surgery, you will get a sedative to help you relax. This drug will make you sleepy. It is usually given in a vein (by IV). A shot may also be used to numb the area. If you had local anesthesia, you may feel some pain and discomfort as it wears off. If you have pain, don't be afraid to say so. Pain medicine works better if you take it before the pain gets bad. Common side effects from sedation include: · Feeling sleepy. (Your doctors and nurses will make sure you are not too sleepy to go home.) · Nausea and vomiting. This usually does not last long. · Feeling tired. Follow-up care is a key part of your treatment and safety. Be sure to make and go to all appointments, and call your doctor if you are having problems. It's also a good idea to know your test results and keep a list of the medicines you take. How can you care for yourself at home? Activity ? · Don't do anything for 24 hours that requires attention to detail. It takes time for the medicine effects to completely wear off.  
? · For your safety, you should not drive or operate any machinery that could be dangerous until the medicine wears off and you can think clearly and react easily. ? · Rest when you feel tired. Getting enough sleep will help you recover. Diet ? · You can eat your normal diet, unless your doctor gives you other instructions. If your stomach is upset, try clear liquids and bland, low-fat foods like plain toast or rice. ? · Drink plenty of fluids (unless your doctor tells you not to). ? · Don't drink alcohol for 24 hours. Medicines ? · Be safe with medicines. Read and follow all instructions on the label. ¨ If the doctor gave you a prescription medicine for pain, take it as prescribed. ¨ If you are not taking a prescription pain medicine, ask your doctor if you can take an over-the-counter medicine. ? · If you think your pain medicine is making you sick to your stomach: 
¨ Take your medicine after meals (unless your doctor has told you not to). ¨ Ask your doctor for a different pain medicine. When should you call for help? Call 911 anytime you think you may need emergency care. For example, call if: 
? · You have severe trouble breathing. ? · You passed out (lost consciousness). ?Call your doctor now or seek immediate medical care if: 
? · You have trouble breathing. ? · You have ongoing or worsening nausea or vomiting. ? · You have a fever. ? · You have a new or worse headache. ? · The medicine is not wearing off and you can't think clearly. ?Watch closely for changes in your health, and be sure to contact your doctor if: 
? · You do not get better as expected. Where can you learn more? Go to http://viral-anna.info/. Enter P688 in the search box to learn more about \"Sedation for a Medical Procedure: Care Instructions. \" Current as of: August 14, 2016 Content Version: 11.4 © 3560-0681 [employer]. Care instructions adapted under license by [employer] (which disclaims liability or warranty for this information). If you have questions about a medical condition or this instruction, always ask your healthcare professional. [place] [address] any warranty or liability for your use of this information. DISCHARGE SUMMARY from Nurse PATIENT INSTRUCTIONS: 
 
After general anesthesia or intravenous sedation, for 24 hours or while taking prescription Narcotics: · Limit your activities · Do not drive and operate hazardous machinery · Do not make important personal or business decisions · Do  not drink alcoholic beverages · If you have not urinated within 8 hours after discharge, please contact your surgeon on call. Report the following to your surgeon: 
· Excessive pain, swelling, redness or odor of or around the surgical area · Temperature over 100.5 · Nausea and vomiting lasting longer than 4 hours or if unable to take medications · Any signs of decreased circulation or nerve impairment to extremity: change in color, persistent  numbness, tingling, coldness or increase pain · Any questions What to do at Home: 
Recommended activity: Activity as tolerated, *  Please give a list of your current medications to your Primary Care Provider. *  Please update this list whenever your medications are discontinued, doses are 
    changed, or new medications (including over-the-counter products) are added.  
 
*  Please carry medication information at all times in case of emergency situations. These are general instructions for a healthy lifestyle: No smoking/ No tobacco products/ Avoid exposure to second hand smoke Surgeon General's Warning:  Quitting smoking now greatly reduces serious risk to your health. Obesity, smoking, and sedentary lifestyle greatly increases your risk for illness A healthy diet, regular physical exercise & weight monitoring are important for maintaining a healthy lifestyle You may be retaining fluid if you have a history of heart failure or if you experience any of the following symptoms:  Weight gain of 3 pounds or more overnight or 5 pounds in a week, increased swelling in our hands or feet or shortness of breath while lying flat in bed. Please call your doctor as soon as you notice any of these symptoms; do not wait until your next office visit. Recognize signs and symptoms of STROKE: 
 
F-face looks uneven A-arms unable to move or move unevenly S-speech slurred or non-existent T-time-call 911 as soon as signs and symptoms begin-DO NOT go Back to bed or wait to see if you get better-TIME IS BRAIN. Warning Signs of HEART ATTACK Call 911 if you have these symptoms: 
? Chest discomfort. Most heart attacks involve discomfort in the center of the chest that lasts more than a few minutes, or that goes away and comes back. It can feel like uncomfortable pressure, squeezing, fullness, or pain. ? Discomfort in other areas of the upper body. Symptoms can include pain or discomfort in one or both arms, the back, neck, jaw, or stomach. ? Shortness of breath with or without chest discomfort. ? Other signs may include breaking out in a cold sweat, nausea, or lightheadedness. Don't wait more than five minutes to call [address]! Fast action can save your life. Calling 911 is almost always the fastest way to get lifesaving treatment.  Emergency Medical Services staff can begin treatment when they arrive  up to an hour sooner than if someone gets to the hospital by car. The discharge information has been reviewed with the patient. The patient verbalized understanding. Discharge medications reviewed with the patient and guardian and appropriate educational materials and side effects teaching were provided. ___________________________________________________ Patient armband removed and shredded 
________________________________________________________________________________ Conferize Announcement We are excited to announce that we are making your provider's discharge notes available to you in Conferize. You will see these notes when they are completed and signed by the physician that discharged you from your recent hospital stay. If you have any questions or concerns about any information you see in Conferize, please call the Health Information Department where you were seen or reach out to your Primary Care Provider for more information about your plan of care. Introducing [facility]! Dear Ese Arias: Thank you for requesting a Conferize account. Our records indicate that you already have an active Conferize account. You can access your account anytime at [email or web address] Did you know that you can access your hospital and ER discharge instructions at any time in Conferize? You can also review all of your test results from your hospital stay or ER visit. Additional Information If you have questions, please visit the Frequently Asked Questions section of the Conferize website at [email or web address] Remember, Conferize is NOT to be used for urgent needs. For medical emergencies, dial 911. Now available from your iPhone and Android! Providers Seen During Your Hospitalization Provider Specialty Primary office phone Fran Velarde MD Cardiology [phone] Your Primary Care Physician (PCP) Primary Care Physician Office Phone Office Fax Domingo Castro [phone] You are allergic to the following Allergen Reactions Zocor (Simvastatin) Hives Biaxin (Clarithromycin) Other (comments) Whelps Ciprofloxacin Rash Pcn (Penicillins) Rash Plavix (Clopidogrel) Rash Wellbutrin (Bupropion Hcl) Other (comments) Whelps Recent Documentation Height Weight Breastfeeding? BMI OB Status Smoking Status 1.702 m 58.7 kg No 20.28 kg/m2 Hysterectomy Former Smoker Emergency Contacts Name Discharge Info Relation Home Work Mobile KumarBrandy N/A  AT THIS TIME [6] Mother [14] [phone] [address] CAREGIVER [3] Other Relative [6] [phone] Patient Belongings The following personal items are in your possession at time of discharge: 
     Visual Aid: Glasses, With patient Please provide this summary of care documentation to your next provider. Signatures-by signing, you are acknowledging that this After Visit Summary has been reviewed with you and you have received a copy. Patient Signature:  ____________________________________________________________ Date:  ____________________________________________________________  
  
Rikki Hamlin Provider Signature:  ____________________________________________________________ Date:  ____________________________________________________________

## 2017-12-05 NOTE — DISCHARGE SUMMARY
Discharge Summary    Patient: Mery Kumar MRN: [id number]  CSN: [id number]    YOB: 1960  Age: 56 y.o. Sex: female    DOA: 12/5/2017 LOS:  LOS: 0 days   Discharge Date:      Primary Care Provider:  Dulce Wu MD    Admission Diagnoses: CAD with angina  Discharge Diagnoses:  CAD  Problem List as of 12/5/2017  Date Reviewed: 8/27/2014          Codes Class Noted - Resolved    Chest pain ICD-10-CM: R07.9  ICD-9-CM: 786.50  8/27/2014 - Present        Left arm pain ICD-10-CM: M79.602  ICD-9-CM: 729.5  8/7/2014 - Present        Epistaxis ICD-10-CM: R04.0  ICD-9-CM: 784.7  12/11/2012 - Present        Fatigue ICD-10-CM: R53.83  ICD-9-CM: 780.79  12/11/2012 - Present        Chest pain, unspecified ICD-10-CM: R07.9  ICD-9-CM: 786.50  9/9/2012 - Present        Other and unspecified hyperlipidemia ICD-10-CM: E78.5  ICD-9-CM: 272.4  6/1/2011 - Present        Coronary atherosclerosis of native coronary artery ICD-10-CM: I25.10  ICD-9-CM: 414.01  Unknown - Present        GERD (gastroesophageal reflux disease) ICD-10-CM: K21.9  ICD-9-CM: 530.81  Unknown - Present        Depression ICD-10-CM: F32.9  ICD-9-CM: 311  Unknown - Present              Discharge Medications:     Current Discharge Medication List      CONTINUE these medications which have NOT CHANGED    Details   docusate sodium (COLACE) 100 mg capsule Take 100 mg by mouth two (2) times a day. raNITIdine (ZANTAC 75) 75 mg tablet Take 75 mg by mouth two (2) times a day. cyanocobalamin 1,000 mcg tablet Take 1,000 mcg by mouth daily. furosemide (LASIX) 20 mg tablet take 1 tablet by mouth once daily  Qty: 90 tablet, Refills: 3      atenolol (TENORMIN) 25 mg tablet TAKE 1/2 TABLET BY MOUTH EVERY DAY  Qty: 90 tablet, Refills: 3      rosuvastatin (CRESTOR) 10 mg tablet Take 1 tablet by mouth daily. Qty: 90 tablet, Refills: 3      montelukast (SINGULAIR) 10 mg tablet Take 10 mg by mouth daily.       desloratadine (CLARINEX) 5 mg disintegrating tablet Take 5 mg by mouth daily. LORazepam (ATIVAN) 0.5 mg tablet Take  by mouth nightly. potassium chloride SR (KLOR-CON 10) 10 mEq tablet Take 10 mEq by mouth daily. aspirin 81 mg tablet Take 81 mg by mouth daily. fluticasone (FLONASE ALLERGY RELIEF) 50 mcg/actuation nasal spray 2 Sprays by Both Nostrils route daily. nitroglycerin (NITROSTAT) 0.4 mg SL tablet 1 tablet by SubLINGual route every five (5) minutes as needed for Chest Pain (total of 3 tabs for one episode of pain). Qty: 75 tablet, Refills: 3      Cholecalciferol, Vitamin D3, 50,000 unit cap Take  by mouth every Monday. azelastine (ASTELIN) 137 mcg nasal spray 1 Spray two (2) times a day. Use in each nostril as directed              Discharge Condition: Stable    Procedures : LHC and coronary angiogram     Consults: None       PHYSICAL EXAM   Visit Vitals    /64 (BP 1 Location: Right arm, BP Patient Position: At rest;Supine)    Pulse 61    Temp 97.9 °F (36.6 °C)    Resp 17    Ht 5' 7\" (1.702 m)    Wt 58.7 kg (129 lb 8 oz)    SpO2 100%    Breastfeeding No    BMI 20.28 kg/m2     General: Awake, cooperative, no acute distress    HEENT: NC, Atraumatic. PERRLA, EOMI. Anicteric sclerae. Lungs:  CTA Bilaterally. No Wheezing/Rhonchi/Rales. Heart:  Regular  rhythm,  No murmur, No Rubs, No Gallops  Abdomen: Soft, Non distended, Non tender. +Bowel sounds,   Extremities: No c/c/e  Psych:   Not anxious or agitated. Neurologic:  No acute neurological deficits. Admission HPI : See H/P    Hospital Course :              LHC and coronary angiogram done via left radial artery. Left main is normal. Mid LAD has patent stent. There is 40-50% stenosis in mid LAD just prior to the stent. Mid LCx has segmental 70-75% stenosis. Mid RCA has patent stent.     iFR of mLcx is 1.0  FFR of mLCx is 0.90      Patient tolerated procedure. Medical management.              Activity: No weight lifting not more than 5 lbs for 1 week from left hand     Diet: Cardiac     Follow-up: In cardiology clinic after 2 weeks    Disposition: Home    Minutes spent on discharge: 40 minutes       Labs: Results:       Chemistry No results for input(s): GLU, NA, K, CL, CO2, BUN, CREA, CA, AGAP, BUCR, TBIL, GPT, AP, TP, ALB, GLOB, AGRAT in the last 72 hours. CBC w/Diff No results for input(s): WBC, RBC, HGB, HCT, PLT, GRANS, LYMPH, EOS, HGBEXT, HCTEXT, PLTEXT in the last 72 hours. Cardiac Enzymes No results for input(s): CPK, CKND1, AMRIT in the last 72 hours. No lab exists for component: CKRMB, TROIP   Coagulation No results for input(s): PTP, INR, APTT in the last 72 hours. No lab exists for component: INREXT    Lipid Panel Lab Results   Component Value Date/Time    Cholesterol, total CANCELED 11/01/2012 12:07 PM    HDL Cholesterol CANCELED 11/01/2012 12:07 PM    LDL, calculated 84 08/08/2012 08:58 AM    VLDL, calculated 21 08/08/2012 08:58 AM    Triglyceride CANCELED 11/01/2012 12:07 PM    CHOL/HDL Ratio 2.8 03/14/2012 03:07 AM      BNP No results for input(s): BNPP in the last 72 hours. Liver Enzymes No results for input(s): TP, ALB, TBIL, AP, SGOT, GPT in the last 72 hours. No lab exists for component: DBIL   Thyroid Studies Lab Results   Component Value Date/Time    TSH 1.25 02/09/2012 09:10 AM            Significant Diagnostic Studies: No results found. No results found for this or any previous visit.         CC: Irvin Kohler MD

## 2017-12-05 NOTE — ROUTINE PROCESS
Cardiac Cath Lab:  Pre Procedure Chart Check     Patients chart was accessed and reviewed for possible and/or scheduled procedure. Creatinine Clearance:  CREATININE: 0.82 MG/DL (12/01/17 1450)  Estimated creatinine clearance: 71.8 mL/min    Total Contrast  Load:  3 x estimated clearance amount= 215.4   ml    75% of Contrast Load:  0.75 x Total Contrast Load=     161.55   ml    No results for input(s): WBC, RBC, HCT, HGB, PLT, INR, APTT, PTP, NA, K, BUN, CREA, GFRAA, GFRNA, CA, MAG, CPK, CKMB, CKNDX, CKND1, TROPT, TROIQ, BNPP, BNP, HCTEXT, HGBEXT, PLTEXT in the last 72 hours. No lab exists for component: DDIMER, GLUCOSE, INREXT    BMI: Body mass index is 20.28 kg/(m^2). ALLERGIES:   Allergies   Allergen Reactions    Zocor [Simvastatin] Hives    Biaxin [Clarithromycin] Other (comments)     Whelps      Ciprofloxacin Rash    Pcn [Penicillins] Rash    Plavix [Clopidogrel] Rash    Wellbutrin [Bupropion Hcl] Other (comments)     Whelps           Lines:        Peripheral IV 12/05/17 Right Forearm (Active)   Site Assessment Clean, dry, & intact 12/5/2017 11:28 AM   Phlebitis Assessment 0 12/5/2017 11:28 AM   Infiltration Assessment 0 12/5/2017 11:28 AM   Dressing Status Clean 12/5/2017 11:28 AM   Dressing Type Tape;Transparent 12/5/2017 11:28 AM   Hub Color/Line Status Pink;Flushed;Capped; Infusing 12/5/2017 11:28 AM   Action Taken Open ports on tubing capped 12/5/2017 11:28 AM   Alcohol Cap Used Yes 12/5/2017 11:28 AM          History:    Past Medical History:   Diagnosis Date    CAD (coronary artery disease)     Chest pain, unspecified 9/9/2012    Coronary atherosclerosis of native coronary artery     Depression     Epistaxis 12/11/2012    Fatigue 12/11/2012    GERD (gastroesophageal reflux disease)     Ill-defined condition     allergies    Ill-defined condition     anal mole and cyst    Left arm pain 8/7/2014    Nausea & vomiting     Other and unspecified hyperlipidemia 6/1/2011    Other and unspecified hyperlipidemia     Ovarian cyst      Past Surgical History:   Procedure Laterality Date    HX APPENDECTOMY      HX BREAST BIOPSY      right cyst    HX CHOLECYSTECTOMY      HX CORONARY STENT PLACEMENT  05/05/11    stent mid right coronary artery stenosis    HX GYN      dysplasia    HX HEART CATHETERIZATION  05/11    stent placement mid coronary artery    HX HYSTERECTOMY      HX KNEE ARTHROSCOPY      left    HX OOPHORECTOMY      HX TONSILLECTOMY       Patient Active Problem List   Diagnosis Code    GERD (gastroesophageal reflux disease) K21.9    Depression F32.9    Coronary atherosclerosis of native coronary artery I25.10    Other and unspecified hyperlipidemia E78.5    Chest pain, unspecified R07.9    Epistaxis R04.0    Fatigue R53.83    Left arm pain M79.602    Chest pain R07.9

## 2017-12-05 NOTE — PROGRESS NOTES
TRANSFER - OUT REPORT:  Verbal report given to [address], RN(name) on Ene Chin being transferred to care(unit) for routine progression of care   Report consisted of patients Situation, Background, Assessment and   Recommendations(SBAR). Information from the following report(s) SBAR, Kardex, Procedure Summary, MAR, Recent Results and Cardiac Rhythm NSR 61bpm was reviewed with the receiving nurse. Cath Lab Report:    Procedure:  [x ] LHC  [ ] RHC  [ ] PTCA   [ ] Peripheral   [ ] Pacemaker [ ] KARIN  [ ] [facility]    Access site:   [ x] Radial     [ ] Brachial     [ ] Femoral    [ ] Jugular   [ ] Chest Wall       Sheath:           [x ] Pulled in Cath Lab   [ ] In place   [ ] To be pulled after:         Closure:          [ ] TR Band x[ ] Radial Band  [ ] Right [ x] Left    [ ] Manual Pressure     [ ] Sheila Rue     [ ] Star Close    [ ] Per Close    [ ] Safe Guard    Site Assessment:   [x ] Clean, Dry, No bleeding    [ ] Minor oozing          [ ] Hematoma: Description:    Stents(s) Placement:  [ ] Left Main:                 [ ] LAD:                [ ] Circ:                [ ] RCA:                [ ] EF:     [ ] Peripheral:      [ ] N/A    Infusion [ ]Angiomax [ ] Integrelin [ ] Heparin d/c'd: Intra procedure Medications:    Fentanyl:200mcg IV  Versed:4mg IV  Other:  Antiplatelet:    Lines:        Peripheral IV 12/05/17 Right Forearm (Active)   Site Assessment Clean, dry, & intact 12/5/2017 11:28 AM   Phlebitis Assessment 0 12/5/2017 11:28 AM   Infiltration Assessment 0 12/5/2017 11:28 AM   Dressing Status Clean 12/5/2017 11:28 AM   Dressing Type Tape;Transparent 12/5/2017 11:28 AM   Hub Color/Line Status Pink;Flushed;Capped; Infusing 12/5/2017 11:28 AM   Action Taken Open ports on tubing capped 12/5/2017 11:28 AM   Alcohol Cap Used Yes 12/5/2017 11:28 AM          Patient Vitals for the past 4 hrs:   Temp Pulse Resp BP SpO2   12/05/17 1411 97.9 °F (36.6 °C) 61 17 114/64 100 %   12/05/17 1118 98.2 °F (36.8 °C) 61 18 104/57 98 %         Extended / Orthostatic Vitals:    Vital Signs  Level of Consciousness: Alert (12/05/17 1411)  Temp: 97.9 °F (36.6 °C) (12/05/17 1411)  Temp Source: Oral (12/05/17 1411)  Pulse (Heart Rate): 61 (12/05/17 1411)  Heart Rate Source: Monitor (12/05/17 1411)  Cardiac Rhythm: Normal sinus rhythm (12/05/17 1411)  Resp Rate: 17 (12/05/17 1411)  BP: 114/64 (12/05/17 1411)  MAP (Calculated): 81 (12/05/17 1411)  BP 1 Location: Right arm (12/05/17 1411)  BP 1 Method: Automatic (12/05/17 1411)  BP Patient Position: At rest;Supine (12/05/17 1411)  MEWS Score: 1 (12/05/17 1411)         Oxygen Therapy  O2 Sat (%): 100 % (12/05/17 1411)  O2 Device: Room air (12/05/17 1118)          Opportunity for questions and clarification was provided.

## 2017-12-05 NOTE — DISCHARGE INSTRUCTIONS
Cardiac Catheterization/Angiography Discharge Instructions    *Check the puncture site frequently for swelling or bleeding. If you see any bleeding, lie down and apply pressure over the area with a clean town or washcloth. Notify your doctor for any redness, swelling, drainage or oozing from the puncture site. Notify your doctor for any fever or chills. *If the leg or arm with the puncture becomes cold, numb or painful, call Dr Ritesh Combs     *Activity should be limited for the next 48 hours. Climb stairs as little as possible and avoid any stooping, bending or strenuous activity for 48 hours. No heavy lifting (anything over 10 pounds) for three days. *Do not drive for 48 hours. *You may resume your usual diet. Drink more fluids than usual.    *Have a responsible person drive you home and stay with you for at least 24 hours after your heart catheterization/angiography. *You may remove the bandage from your Left and Arm in 24 hours. You may shower in 24 hours. No tub baths, hot tubs or swimming for one week. Do not place any lotions, creams, powders, ointments over the puncture site for one week. You may place a clean band-aid over the puncture site each day for 5 days. Change this daily. Sedation for a Medical Procedure: Care Instructions  Your Care Instructions    For a minor procedure or surgery, you will get a sedative to help you relax. This drug will make you sleepy. It is usually given in a vein (by IV). A shot may also be used to numb the area. If you had local anesthesia, you may feel some pain and discomfort as it wears off. If you have pain, don't be afraid to say so. Pain medicine works better if you take it before the pain gets bad. Common side effects from sedation include:  · Feeling sleepy. (Your doctors and nurses will make sure you are not too sleepy to go home.)  · Nausea and vomiting. This usually does not last long. · Feeling tired.   Follow-up care is a key part of your treatment and safety. Be sure to make and go to all appointments, and call your doctor if you are having problems. It's also a good idea to know your test results and keep a list of the medicines you take. How can you care for yourself at home? Activity  ? · Don't do anything for 24 hours that requires attention to detail. It takes time for the medicine effects to completely wear off.   ? · For your safety, you should not drive or operate any machinery that could be dangerous until the medicine wears off and you can think clearly and react easily. ? · Rest when you feel tired. Getting enough sleep will help you recover. Diet  ? · You can eat your normal diet, unless your doctor gives you other instructions. If your stomach is upset, try clear liquids and bland, low-fat foods like plain toast or rice. ? · Drink plenty of fluids (unless your doctor tells you not to). ? · Don't drink alcohol for 24 hours. Medicines  ? · Be safe with medicines. Read and follow all instructions on the label. ¨ If the doctor gave you a prescription medicine for pain, take it as prescribed. ¨ If you are not taking a prescription pain medicine, ask your doctor if you can take an over-the-counter medicine. ? · If you think your pain medicine is making you sick to your stomach:  ¨ Take your medicine after meals (unless your doctor has told you not to). ¨ Ask your doctor for a different pain medicine. When should you call for help? Call 911 anytime you think you may need emergency care. For example, call if:  ? · You have severe trouble breathing. ? · You passed out (lost consciousness). ?Call your doctor now or seek immediate medical care if:  ? · You have trouble breathing. ? · You have ongoing or worsening nausea or vomiting. ? · You have a fever. ? · You have a new or worse headache. ? · The medicine is not wearing off and you can't think clearly. ? Watch closely for changes in your health, and be sure to contact your doctor if:  ? · You do not get better as expected. Where can you learn more? Go to http://viral-anna.info/. Enter D811 in the search box to learn more about \"Sedation for a Medical Procedure: Care Instructions. \"  Current as of: August 14, 2016  Content Version: 11.4  © 5036-9316 [employer]. Care instructions adapted under license by [employer] (which disclaims liability or warranty for this information). If you have questions about a medical condition or this instruction, always ask your healthcare professional. [place] [address] any warranty or liability for your use of this information. DISCHARGE SUMMARY from Nurse    PATIENT INSTRUCTIONS:    After general anesthesia or intravenous sedation, for 24 hours or while taking prescription Narcotics:  · Limit your activities  · Do not drive and operate hazardous machinery  · Do not make important personal or business decisions  · Do  not drink alcoholic beverages  · If you have not urinated within 8 hours after discharge, please contact your surgeon on call. Report the following to your surgeon:  · Excessive pain, swelling, redness or odor of or around the surgical area  · Temperature over 100.5  · Nausea and vomiting lasting longer than 4 hours or if unable to take medications  · Any signs of decreased circulation or nerve impairment to extremity: change in color, persistent  numbness, tingling, coldness or increase pain  · Any questions    What to do at Home:  Recommended activity: Activity as tolerated,       *  Please give a list of your current medications to your Primary Care Provider. *  Please update this list whenever your medications are discontinued, doses are      changed, or new medications (including over-the-counter products) are added. *  Please carry medication information at all times in case of emergency situations.     These are general instructions for a healthy lifestyle:    No smoking/ No tobacco products/ Avoid exposure to second hand smoke  Surgeon General's Warning:  Quitting smoking now greatly reduces serious risk to your health. Obesity, smoking, and sedentary lifestyle greatly increases your risk for illness    A healthy diet, regular physical exercise & weight monitoring are important for maintaining a healthy lifestyle    You may be retaining fluid if you have a history of heart failure or if you experience any of the following symptoms:  Weight gain of 3 pounds or more overnight or 5 pounds in a week, increased swelling in our hands or feet or shortness of breath while lying flat in bed. Please call your doctor as soon as you notice any of these symptoms; do not wait until your next office visit. Recognize signs and symptoms of STROKE:    F-face looks uneven    A-arms unable to move or move unevenly    S-speech slurred or non-existent    T-time-call 911 as soon as signs and symptoms begin-DO NOT go       Back to bed or wait to see if you get better-TIME IS BRAIN. Warning Signs of HEART ATTACK     Call 911 if you have these symptoms:   Chest discomfort. Most heart attacks involve discomfort in the center of the chest that lasts more than a few minutes, or that goes away and comes back. It can feel like uncomfortable pressure, squeezing, fullness, or pain.  Discomfort in other areas of the upper body. Symptoms can include pain or discomfort in one or both arms, the back, neck, jaw, or stomach.  Shortness of breath with or without chest discomfort.  Other signs may include breaking out in a cold sweat, nausea, or lightheadedness. Don't wait more than five minutes to call 911 - MINUTES MATTER! Fast action can save your life. Calling 911 is almost always the fastest way to get lifesaving treatment. Emergency Medical Services staff can begin treatment when they arrive -- up to an hour sooner than if someone gets to the hospital by car. The discharge information has been reviewed with the patient. The patient verbalized understanding. Discharge medications reviewed with the patient and guardian and appropriate educational materials and side effects teaching were provided.   ___________________________________________________  Patient armband removed and shredded  ________________________________________________________________________________

## 2017-12-05 NOTE — PROGRESS NOTES
Pt is all prepped and ready for procedure. 1320 Pt back to care unit S/P cardiac cath. TR band to Lt wrist. Procedure tolerated well    1430 Attempted to remove band, noted bleeding,    1630 TR band removed and some hematoma noted and wrapped with coban, immobilized back in place. 1740 Discharge instructions given to pt and friend and they verbalized all understandings. Pt escorted to car and left in stable condition with no c/o pain.

## 2017-12-05 NOTE — H&P
Date of Surgery Update:  Bharti Arango was seen and examined. History and physical has been reviewed. The patient has been examined.  There have been no significant clinical changes since the completion of the originally dated History and Physical.    Signed By: Chelsea Mckeon MD     December 5, 2017 12:40 PM

## 2017-12-05 NOTE — PROCEDURES
LHC and coronary angiogram done via left radial artery. Left main is normal. Mid LAD has patent stent. There is 40-50% stenosis in mid LAD just prior to the stent. Mid LCx has segmental 70-75% stenosis. Mid RCA has patent stent. iFR of mLcx is 1.0  FFR of mLCx is 0.90     Patient tolerated procedure. Medical management.

## 2018-12-26 ENCOUNTER — APPOINTMENT (OUTPATIENT)
Dept: GENERAL RADIOLOGY | Age: 58
End: 2018-12-26
Attending: EMERGENCY MEDICINE
Payer: COMMERCIAL

## 2018-12-26 ENCOUNTER — HOSPITAL ENCOUNTER (EMERGENCY)
Age: 58
Discharge: HOME | End: 2018-12-27
Attending: EMERGENCY MEDICINE
Payer: COMMERCIAL

## 2018-12-26 DIAGNOSIS — R07.9 CHEST PAIN, UNSPECIFIED TYPE: Primary | ICD-10-CM

## 2018-12-26 DIAGNOSIS — I25.118 ATHEROSCLEROSIS OF NATIVE CORONARY ARTERY OF NATIVE HEART WITH STABLE ANGINA PECTORIS (HCC): ICD-10-CM

## 2018-12-26 LAB
ALBUMIN SERPL-MCNC: 4 G/DL (ref 3.4–5)
ALBUMIN/GLOB SERPL: 1.3 {RATIO} (ref 0.8–1.7)
ALP SERPL-CCNC: 116 U/L (ref 45–117)
ALT SERPL-CCNC: 31 U/L (ref 13–56)
AMORPH CRY URNS QL MICRO: ABNORMAL
ANION GAP SERPL CALC-SCNC: 5 MMOL/L (ref 3–18)
APPEARANCE UR: ABNORMAL
AST SERPL-CCNC: 21 U/L (ref 15–37)
BACTERIA URNS QL MICRO: ABNORMAL /HPF
BASOPHILS # BLD: 0 K/UL (ref 0–0.1)
BASOPHILS NFR BLD: 1 % (ref 0–2)
BILIRUB SERPL-MCNC: 0.2 MG/DL (ref 0.2–1)
BILIRUB UR QL: NEGATIVE
BUN SERPL-MCNC: 15 MG/DL (ref 7–18)
BUN/CREAT SERPL: 18
CALCIUM SERPL-MCNC: 8.9 MG/DL (ref 8.5–10.1)
CHLORIDE SERPL-SCNC: 104 MMOL/L (ref 100–108)
CK MB CFR SERPL CALC: 1.7 % (ref 0–4)
CK MB SERPL-MCNC: 2.5 NG/ML (ref 5–25)
CK SERPL-CCNC: 145 U/L (ref 26–192)
CO2 SERPL-SCNC: 33 MMOL/L (ref 21–32)
COLOR UR: YELLOW
CREAT SERPL-MCNC: 0.84 MG/DL (ref 0.6–1.3)
DIFFERENTIAL METHOD BLD: NORMAL
EOSINOPHIL # BLD: 0.1 K/UL (ref 0–0.4)
EOSINOPHIL NFR BLD: 2 % (ref 0–5)
EPITH CASTS URNS QL MICRO: ABNORMAL /LPF (ref 0–5)
ERYTHROCYTE [DISTWIDTH] IN BLOOD BY AUTOMATED COUNT: 13.8 % (ref 11.6–14.5)
GLOBULIN SER CALC-MCNC: 3 G/DL (ref 2–4)
GLUCOSE SERPL-MCNC: 90 MG/DL (ref 74–99)
GLUCOSE UR STRIP.AUTO-MCNC: NEGATIVE MG/DL
HCT VFR BLD AUTO: 42.1 % (ref 35–45)
HGB BLD-MCNC: 13.4 G/DL (ref 12–16)
HGB UR QL STRIP: NEGATIVE
KETONES UR QL STRIP.AUTO: NEGATIVE MG/DL
LEUKOCYTE ESTERASE UR QL STRIP.AUTO: ABNORMAL
LYMPHOCYTES # BLD: 2.7 K/UL (ref 0.9–3.6)
LYMPHOCYTES NFR BLD: 34 % (ref 21–52)
MCH RBC QN AUTO: 29.8 PG (ref 24–34)
MCHC RBC AUTO-ENTMCNC: 31.8 G/DL (ref 31–37)
MCV RBC AUTO: 93.6 FL (ref 74–97)
MONOCYTES # BLD: 0.5 K/UL (ref 0.05–1.2)
MONOCYTES NFR BLD: 6 % (ref 3–10)
NEUTS SEG # BLD: 4.5 K/UL (ref 1.8–8)
NEUTS SEG NFR BLD: 57 % (ref 40–73)
NITRITE UR QL STRIP.AUTO: NEGATIVE
PH UR STRIP: 7.5 [PH] (ref 5–8)
PLATELET # BLD AUTO: 263 K/UL (ref 135–420)
PMV BLD AUTO: 10.2 FL (ref 9.2–11.8)
POTASSIUM SERPL-SCNC: 3.6 MMOL/L (ref 3.5–5.5)
PROT SERPL-MCNC: 7 G/DL (ref 6.4–8.2)
PROT UR STRIP-MCNC: NEGATIVE MG/DL
RBC # BLD AUTO: 4.5 M/UL (ref 4.2–5.3)
RBC #/AREA URNS HPF: NEGATIVE /HPF (ref 0–5)
SODIUM SERPL-SCNC: 142 MMOL/L (ref 136–145)
SP GR UR REFRACTOMETRY: 1.01 (ref 1–1.03)
TROPONIN I SERPL-MCNC: <0.02 NG/ML (ref 0–0.04)
UROBILINOGEN UR QL STRIP.AUTO: 0.2 EU/DL (ref 0.2–1)
WBC # BLD AUTO: 7.8 K/UL (ref 4.6–13.2)

## 2018-12-26 PROCEDURE — 93005 ELECTROCARDIOGRAM TRACING: CPT

## 2018-12-26 PROCEDURE — 71045 X-RAY EXAM CHEST 1 VIEW: CPT

## 2018-12-26 PROCEDURE — 80053 COMPREHEN METABOLIC PANEL: CPT

## 2018-12-26 PROCEDURE — 81001 URINALYSIS AUTO W/SCOPE: CPT

## 2018-12-26 PROCEDURE — 99285 EMERGENCY DEPT VISIT HI MDM: CPT

## 2018-12-26 PROCEDURE — 85025 COMPLETE CBC W/AUTO DIFF WBC: CPT

## 2018-12-26 PROCEDURE — 82550 ASSAY OF CK (CPK): CPT

## 2018-12-27 VITALS
TEMPERATURE: 97.9 F | WEIGHT: 130 LBS | SYSTOLIC BLOOD PRESSURE: 110 MMHG | BODY MASS INDEX: 20.89 KG/M2 | RESPIRATION RATE: 14 BRPM | HEIGHT: 66 IN | HEART RATE: 67 BPM | OXYGEN SATURATION: 97 % | DIASTOLIC BLOOD PRESSURE: 61 MMHG

## 2018-12-27 LAB
CK MB CFR SERPL CALC: 2 % (ref 0–4)
CK MB SERPL-MCNC: 2.5 NG/ML (ref 5–25)
CK SERPL-CCNC: 124 U/L (ref 26–192)
TROPONIN I SERPL-MCNC: <0.02 NG/ML (ref 0–0.04)

## 2018-12-27 PROCEDURE — 82550 ASSAY OF CK (CPK): CPT

## 2018-12-27 RX ORDER — METHOCARBAMOL 500 MG/1
1000 TABLET, FILM COATED ORAL 3 TIMES DAILY
Qty: 30 TAB | Refills: 0 | Status: SHIPPED | OUTPATIENT
Start: 2018-12-27 | End: 2019-03-31

## 2018-12-27 NOTE — ED TRIAGE NOTES
Pt arrives ambulatory to ED with c\o CP x 1 month, pt sts she talked to MD Gregoria arango 1 month ago on the phone about pain and was told to come to ED, pt sts she did not want to until now, pt describes pain as fluttery/tightness, pt has hx of 2 stents, pt is able to make needs known speaking in complete sentences, pt in nad at this time

## 2018-12-27 NOTE — ED PROVIDER NOTES
EMERGENCY DEPARTMENT HISTORY AND PHYSICAL EXAM    Date: 12/26/2018  Patient Name: Ludin Lane    History of Presenting Illness     Chief Complaint   Patient presents with    Chest Pain         History Provided By: Patient    Chief Complaint: CP  Duration: 1 month ago  Timing:  Intermittent  Location: chest  Quality: fluttery/tightness/squeezing  Associated Symptoms: \"funny feeling\" in her ears x 2-3 weeks ago and dry cough    Additional History (Context):   10:32 PM   Ludin Lane is a 66 y.o. female with PMHX of CAD and PSHx coronary stents x2 who presents ambulatory to the emergency department C/O central CP lasting a few minutes onset x 1 month raking leaves. Pt describes pain as fluttery/tightness/squeezing. Associated sxs include \"funny feeling\" in her ears x 2-3 weeks ago and dry cough. Pt reports that she pulled a muscle a couple of weeks ago while doing yard work and was seen by her PCP and dx with costochondritis. Pt states that she talked to Dr. Maggie Mc x 1 month ago on the phone about pain and was told to come to ED but did not want to until now. Pt has FHx CAD and MI in mother at 42 y/o and HTN but denies FHx of DM. Pt takes 81 mg Aspirin. Pt is a former smoker but denies leg swelling, nausea, SOB, diaphoresis, H/O MI and any other sxs or complaints. PCP: Yuli Norton MD    Current Outpatient Medications   Medication Sig Dispense Refill    methocarbamol (ROBAXIN) 500 mg tablet Take 2 Tabs by mouth three (3) times daily. 30 Tab 0    docusate sodium (COLACE) 100 mg capsule Take 100 mg by mouth two (2) times a day.  fluticasone (FLONASE ALLERGY RELIEF) 50 mcg/actuation nasal spray 2 Sprays by Both Nostrils route daily.  raNITIdine (ZANTAC 75) 75 mg tablet Take 75 mg by mouth two (2) times a day.  cyanocobalamin 1,000 mcg tablet Take 1,000 mcg by mouth daily.       furosemide (LASIX) 20 mg tablet take 1 tablet by mouth once daily 90 tablet 3    atenolol (TENORMIN) 25 mg tablet TAKE 1/2 TABLET BY MOUTH EVERY DAY 90 tablet 3    nitroglycerin (NITROSTAT) 0.4 mg SL tablet 1 tablet by SubLINGual route every five (5) minutes as needed for Chest Pain (total of 3 tabs for one episode of pain). 75 tablet 3    rosuvastatin (CRESTOR) 10 mg tablet Take 1 tablet by mouth daily. 90 tablet 3    Cholecalciferol, Vitamin D3, 50,000 unit cap Take  by mouth every Monday.  montelukast (SINGULAIR) 10 mg tablet Take 10 mg by mouth daily.  desloratadine (CLARINEX) 5 mg disintegrating tablet Take 5 mg by mouth daily.  azelastine (ASTELIN) 137 mcg nasal spray 1 Spray two (2) times a day. Use in each nostril as directed       LORazepam (ATIVAN) 0.5 mg tablet Take  by mouth nightly.  potassium chloride SR (KLOR-CON 10) 10 mEq tablet Take 10 mEq by mouth daily.  aspirin 81 mg tablet Take 81 mg by mouth daily.          Past History     Past Medical History:  Past Medical History:   Diagnosis Date    CAD (coronary artery disease)     Chest pain, unspecified 9/9/2012    Coronary atherosclerosis of native coronary artery     Depression     Epistaxis 12/11/2012    Fatigue 12/11/2012    GERD (gastroesophageal reflux disease)     Ill-defined condition     allergies    Ill-defined condition     anal mole and cyst    Left arm pain 8/7/2014    Nausea & vomiting     Other and unspecified hyperlipidemia 6/1/2011    Other and unspecified hyperlipidemia     Ovarian cyst        Past Surgical History:  Past Surgical History:   Procedure Laterality Date    HX APPENDECTOMY      HX BREAST BIOPSY      right cyst    HX CHOLECYSTECTOMY      HX CORONARY STENT PLACEMENT  05/05/11    stent mid right coronary artery stenosis    HX GYN      dysplasia    HX HEART CATHETERIZATION  05/11    stent placement mid coronary artery    HX HYSTERECTOMY      HX KNEE ARTHROSCOPY      left    HX OOPHORECTOMY      HX TONSILLECTOMY         Family History:  Family History   Problem Relation Age of Onset    Coronary Artery Disease Mother 46    Heart Attack Mother 46       Social History:  Social History     Tobacco Use    Smoking status: Former Smoker     Packs/day: 0.80     Years: 30.00     Pack years: 24.00     Types: Cigarettes     Last attempt to quit: 2011     Years since quittin.6    Smokeless tobacco: Never Used   Substance Use Topics    Alcohol use: No     Comment: ross yearly    Drug use: No       Allergies: Allergies   Allergen Reactions    Zocor [Simvastatin] Hives    Biaxin [Clarithromycin] Other (comments)     [place]ps      Ciprofloxacin Rash    Pcn [Penicillins] Rash    Plavix [Clopidogrel] Rash    Wellbutrin [Bupropion Hcl] Other (comments)     [place]helps             Review of Systems   Review of Systems   Constitutional: Negative for diaphoresis. HENT: Positive for ear pain. Respiratory: Positive for cough. Negative for shortness of breath. Choking: Dry. Cardiovascular: Positive for chest pain. Negative for leg swelling. Gastrointestinal: Negative for nausea. All other systems reviewed and are negative. Physical Exam     Vitals:    18 2135   BP: 148/69   Pulse: 78   Resp: 16   Temp: 97.9 °F (36.6 °C)   SpO2: 100%   Weight: 59 kg (130 lb)   Height: 5' 6\" (1.676 m)     Physical Exam   Constitutional: She is oriented to person, place, and time. She appears well-developed and well-nourished. No distress. Pleasant, well appearing, nontoxic   HENT:   Head: Normocephalic and atraumatic. Right Ear: External ear normal.   Left Ear: External ear normal.   Mouth/Throat: Oropharynx is clear and moist. No oropharyngeal exudate. Eyes: Conjunctivae and EOM are normal. Pupils are equal, round, and reactive to light. No scleral icterus. Neck: Normal range of motion. Neck supple. No JVD present. No tracheal deviation present. No thyromegaly present. Cardiovascular: Normal rate, regular rhythm and normal heart sounds.    Pulmonary/Chest: Effort normal and breath sounds normal. No stridor. No respiratory distress. She exhibits tenderness. TTP to lower chest wall   Abdominal: Soft. Bowel sounds are normal. She exhibits no distension. There is no tenderness. There is no rebound and no guarding. Musculoskeletal: Normal range of motion. She exhibits no edema or tenderness. Lymphadenopathy:     She has no cervical adenopathy. Neurological: She is alert and oriented to person, place, and time. She has normal reflexes. No cranial nerve deficit. Coordination normal.   Skin: Skin is warm and dry. No rash noted. She is not diaphoretic. No erythema. Psychiatric: She has a normal mood and affect. Her behavior is normal. Judgment and thought content normal.   Nursing note and vitals reviewed. Diagnostic Study Results     Labs -     Recent Results (from the past 12 hour(s))   EKG, 12 LEAD, INITIAL    Collection Time: 12/26/18  9:29 PM   Result Value Ref Range    Ventricular Rate 73 BPM    Atrial Rate 73 BPM    P-R Interval 150 ms    QRS Duration 92 ms    Q-T Interval 382 ms    QTC Calculation (Bezet) 420 ms    Calculated P Axis 73 degrees    Calculated R Axis 71 degrees    Calculated T Axis 65 degrees    Diagnosis       Normal sinus rhythm  Possible Left atrial enlargement  Borderline ECG  When compared with ECG of 06-MAR-2017 11:02,  No significant change was found     METABOLIC PANEL, COMPREHENSIVE    Collection Time: 12/26/18  9:30 PM   Result Value Ref Range    Sodium 142 136 - 145 mmol/L    Potassium 3.6 3.5 - 5.5 mmol/L    Chloride 104 100 - 108 mmol/L    CO2 33 (H) 21 - 32 mmol/L    Anion gap 5 3.0 - 18 mmol/L    Glucose 90 74 - 99 mg/dL    BUN 15 7.0 - 18 MG/DL    Creatinine 0.84 0.6 - 1.3 MG/DL    BUN/Creatinine ratio 18      GFR est AA >60 >60 ml/min/1.73m2    GFR est non-AA >60 >60 ml/min/1.73m2    Calcium 8.9 8.5 - 10.1 MG/DL    Bilirubin, total 0.2 0.2 - 1.0 MG/DL    ALT (SGPT) 31 13 - 56 U/L    AST (SGOT) 21 15 - 37 U/L    Alk.  phosphatase 116 45 - 117 U/L    Protein, total 7.0 6.4 - 8.2 g/dL    Albumin 4.0 3.4 - 5.0 g/dL    Globulin 3.0 2.0 - 4.0 g/dL    A-G Ratio 1.3 0.8 - 1.7     CBC WITH AUTOMATED DIFF    Collection Time: 12/26/18  9:30 PM   Result Value Ref Range    WBC 7.8 4.6 - 13.2 K/uL    RBC 4.50 4. 20 - 5.30 M/uL    HGB 13.4 12.0 - 16.0 g/dL    HCT 42.1 35.0 - 45.0 %    MCV 93.6 74.0 - 97.0 FL    MCH 29.8 24.0 - 34.0 PG    MCHC 31.8 31.0 - 37.0 g/dL    RDW 13.8 11.6 - 14.5 %    PLATELET 480 351 - 161 K/uL    MPV 10.2 9.2 - 11.8 FL    NEUTROPHILS 57 40 - 73 %    LYMPHOCYTES 34 21 - 52 %    MONOCYTES 6 3 - 10 %    EOSINOPHILS 2 0 - 5 %    BASOPHILS 1 0 - 2 %    ABS. NEUTROPHILS 4.5 1.8 - 8.0 K/UL    ABS. LYMPHOCYTES 2.7 0.9 - 3.6 K/UL    ABS. MONOCYTES 0.5 0.05 - 1.2 K/UL    ABS. EOSINOPHILS 0.1 0.0 - 0.4 K/UL    ABS.  BASOPHILS 0.0 0.0 - 0.1 K/UL    DF AUTOMATED     CARDIAC PANEL,(CK, CKMB & TROPONIN)    Collection Time: 12/26/18  9:30 PM   Result Value Ref Range     26 - 192 U/L    CK - MB 2.5 <3.6 ng/ml    CK-MB Index 1.7 0.0 - 4.0 %    Troponin-I, QT <0.02 0.0 - 0.045 NG/ML   URINALYSIS W/ RFLX MICROSCOPIC    Collection Time: 12/26/18  9:45 PM   Result Value Ref Range    Color YELLOW      Appearance TURBID      Specific gravity 1.015 1.005 - 1.030      pH (UA) 7.5 5.0 - 8.0      Protein NEGATIVE  NEG mg/dL    Glucose NEGATIVE  NEG mg/dL    Ketone NEGATIVE  NEG mg/dL    Bilirubin NEGATIVE  NEG      Blood NEGATIVE  NEG      Urobilinogen 0.2 0.2 - 1.0 EU/dL    Nitrites NEGATIVE  NEG      Leukocyte Esterase TRACE (A) NEG     URINE MICROSCOPIC ONLY    Collection Time: 12/26/18  9:45 PM   Result Value Ref Range    RBC NEGATIVE  0 - 5 /hpf    Epithelial cells FEW 0 - 5 /lpf    Bacteria 2+ (A) NEG /hpf    Amorphous Crystals 2+ (A) NEG   CARDIAC PANEL,(CK, CKMB & TROPONIN)    Collection Time: 12/27/18 12:00 AM   Result Value Ref Range     26 - 192 U/L    CK - MB 2.5 <3.6 ng/ml    CK-MB Index 2.0 0.0 - 4.0 %    Troponin-I, QT <0.02 0.0 - 0.045 NG/ML       Radiologic Studies -   XR CHEST PORT    (Results Pending)   12:49 AM  RADIOLOGY FINDINGS  Chest X-ray shows normal findings  Pending review by Radiologist  Recorded by Teresa Combs, ED Scribe, as dictated by Mignon Quintero. Reese Babinski, MD     CT Results  (Last 48 hours)    None        CXR Results  (Last 48 hours)    None          Medications given in the ED-  Medications - No data to display      Medical Decision Making   I am the first provider for this patient. I reviewed the vital signs, available nursing notes, past medical history, past surgical history, family history and social history. Vital Signs-Reviewed the patient's vital signs. Pulse Oximetry Analysis - 100% on RA     Cardiac Monitor:  Rate: 71 bpm  Rhythm: NSR    EKG interpretation: (Preliminary)  10:03 PM   NSR, 73 bpm, normal ST segments, no STEMI  EKG read by Mignon Quintero. Reese Babinski, MD at 10:57 PM     Records Reviewed: Nursing Notes and Old Medical Records    Provider Notes (Medical Decision Making):   Discussion: Sxs appear to be chest wall in nature. Case review with cardiologist. He asks we get a second set of cardiac enzymes. Procedures:  Procedures    ED Course:   10:32 PM Initial assessment performed. The patients presenting problems have been discussed, and they are in agreement with the care plan formulated and outlined with them. I have encouraged them to ask questions as they arise throughout their visit. 11:37 PM Discussed patient's history, exam, and available diagnostics results with Celsa Maya MD, Cardiology, who agree with agrees with current management, 2 sets of Troponin, and outpatient follow up. Diagnosis and Disposition       DISCHARGE NOTE:  12:47 AM  Radha Peraza's  results have been reviewed with her. She has been counseled regarding her diagnosis, treatment, and plan.   She verbally conveys understanding and agreement of the signs, symptoms, diagnosis, treatment and prognosis and additionally agrees to follow up as discussed. She also agrees with the care-plan and conveys that all of her questions have been answered. I have also provided discharge instructions for her that include: educational information regarding their diagnosis and treatment, and list of reasons why they would want to return to the ED prior to their follow-up appointment, should her condition change. She has been provided with education for proper emergency department utilization. CLINICAL IMPRESSION:    1. Chest pain, unspecified type    2. Atherosclerosis of native coronary artery of native heart with stable angina pectoris ([facility] [address]        PLAN:  1. D/C Home  2. Current Discharge Medication List      START taking these medications    Details   methocarbamol (ROBAXIN) 500 mg tablet Take 2 Tabs by mouth three (3) times daily. Qty: 30 Tab, Refills: 0         CONTINUE these medications which have NOT CHANGED    Details   docusate sodium (COLACE) 100 mg capsule Take 100 mg by mouth two (2) times a day. fluticasone (FLONASE ALLERGY RELIEF) 50 mcg/actuation nasal spray 2 Sprays by Both Nostrils route daily. raNITIdine (ZANTAC 75) 75 mg tablet Take 75 mg by mouth two (2) times a day. cyanocobalamin 1,000 mcg tablet Take 1,000 mcg by mouth daily. furosemide (LASIX) 20 mg tablet take 1 tablet by mouth once daily  Qty: 90 tablet, Refills: 3      atenolol (TENORMIN) 25 mg tablet TAKE 1/2 TABLET BY MOUTH EVERY DAY  Qty: 90 tablet, Refills: 3      nitroglycerin (NITROSTAT) 0.4 mg SL tablet 1 tablet by SubLINGual route every five (5) minutes as needed for Chest Pain (total of 3 tabs for one episode of pain). Qty: 75 tablet, Refills: 3      rosuvastatin (CRESTOR) 10 mg tablet Take 1 tablet by mouth daily. Qty: 90 tablet, Refills: 3      Cholecalciferol, Vitamin D3, 50,000 unit cap Take  by mouth every Monday. montelukast (SINGULAIR) 10 mg tablet Take 10 mg by mouth daily.       desloratadine (CLARINEX) 5 mg disintegrating tablet Take 5 mg by mouth daily. azelastine (ASTELIN) 137 mcg nasal spray 1 Spray two (2) times a day. Use in each nostril as directed       LORazepam (ATIVAN) 0.5 mg tablet Take  by mouth nightly. potassium chloride SR (KLOR-CON 10) 10 mEq tablet Take 10 mEq by mouth daily. aspirin 81 mg tablet Take 81 mg by mouth daily. 3.   Follow-up Information     Follow up With Specialties Details Why Contact Info    Maycol Moncada MD Family Practice Go to For primary care follow-up [facility] [id number] [phone]      [facility] EMERGENCY DEPT Emergency Medicine Go to As needed, if symptoms worsen [address]  [address] [facility] [id number] [phone]    Monty Banda MD Cardiology Schedule an appointment as soon as possible for a visit in 2 days For cardiology follow up [address] [facility] [place] [phone]          _______________________________    Attestations: This note is prepared by Spimerosanne Abarca and [employer], acting as Scribe for Sarah Reyes MD.    Sarah Reyes MD:  The scribe's documentation has been prepared under my direction and personally reviewed by me in its entirety.   I confirm that the note above accurately reflects all work, treatment, procedures, and medical decision making performed by me.  _______________________________

## 2018-12-27 NOTE — ED NOTES
I have reviewed discharge instructions with the patient. The patient verbalized understanding. I have reviewed the provider's instructions with the patient, answering all questions to her satisfaction. Discharge medications reviewed with patient and appropriate educational materials and side effects teaching were provided.

## 2018-12-27 NOTE — DISCHARGE INSTRUCTIONS

## 2019-03-31 ENCOUNTER — HOSPITAL ENCOUNTER (EMERGENCY)
Age: 59
Discharge: HOME | End: 2019-03-31
Attending: EMERGENCY MEDICINE
Payer: COMMERCIAL

## 2019-03-31 ENCOUNTER — APPOINTMENT (OUTPATIENT)
Dept: GENERAL RADIOLOGY | Age: 59
End: 2019-03-31
Attending: EMERGENCY MEDICINE
Payer: COMMERCIAL

## 2019-03-31 VITALS
OXYGEN SATURATION: 96 % | HEIGHT: 67 IN | BODY MASS INDEX: 21.97 KG/M2 | TEMPERATURE: 98.3 F | RESPIRATION RATE: 15 BRPM | HEART RATE: 56 BPM | WEIGHT: 140 LBS | DIASTOLIC BLOOD PRESSURE: 51 MMHG | SYSTOLIC BLOOD PRESSURE: 95 MMHG

## 2019-03-31 DIAGNOSIS — R07.9 CHEST PAIN, UNSPECIFIED TYPE: Primary | ICD-10-CM

## 2019-03-31 DIAGNOSIS — I25.118 CORONARY ARTERY DISEASE OF NATIVE HEART WITH STABLE ANGINA PECTORIS, UNSPECIFIED VESSEL OR LESION TYPE (HCC): ICD-10-CM

## 2019-03-31 LAB
ALBUMIN SERPL-MCNC: 3.8 G/DL (ref 3.4–5)
ALBUMIN/GLOB SERPL: 1.3 {RATIO} (ref 0.8–1.7)
ALP SERPL-CCNC: 123 U/L (ref 45–117)
ALT SERPL-CCNC: 24 U/L (ref 13–56)
ANION GAP SERPL CALC-SCNC: 6 MMOL/L (ref 3–18)
AST SERPL-CCNC: 24 U/L (ref 15–37)
BASOPHILS # BLD: 0 K/UL (ref 0–0.1)
BASOPHILS NFR BLD: 1 % (ref 0–2)
BILIRUB SERPL-MCNC: 0.3 MG/DL (ref 0.2–1)
BNP SERPL-MCNC: 259 PG/ML (ref 0–900)
BUN SERPL-MCNC: 14 MG/DL (ref 7–18)
BUN/CREAT SERPL: 14 (ref 12–20)
CALCIUM SERPL-MCNC: 9.1 MG/DL (ref 8.5–10.1)
CHLORIDE SERPL-SCNC: 106 MMOL/L (ref 100–108)
CK MB CFR SERPL CALC: 1.3 % (ref 0–4)
CK MB SERPL-MCNC: 4 NG/ML (ref 5–25)
CK SERPL-CCNC: 297 U/L (ref 26–192)
CO2 SERPL-SCNC: 29 MMOL/L (ref 21–32)
CREAT SERPL-MCNC: 0.99 MG/DL (ref 0.6–1.3)
D DIMER PPP FEU-MCNC: 0.4 UG/ML(FEU)
DIFFERENTIAL METHOD BLD: NORMAL
EOSINOPHIL # BLD: 0.1 K/UL (ref 0–0.4)
EOSINOPHIL NFR BLD: 2 % (ref 0–5)
ERYTHROCYTE [DISTWIDTH] IN BLOOD BY AUTOMATED COUNT: 13.4 % (ref 11.6–14.5)
GLOBULIN SER CALC-MCNC: 2.9 G/DL (ref 2–4)
GLUCOSE SERPL-MCNC: 99 MG/DL (ref 74–99)
HCT VFR BLD AUTO: 42.7 % (ref 35–45)
HGB BLD-MCNC: 13.8 G/DL (ref 12–16)
LYMPHOCYTES # BLD: 1.8 K/UL (ref 0.9–3.6)
LYMPHOCYTES NFR BLD: 30 % (ref 21–52)
MAGNESIUM SERPL-MCNC: 2.4 MG/DL (ref 1.6–2.6)
MCH RBC QN AUTO: 29.9 PG (ref 24–34)
MCHC RBC AUTO-ENTMCNC: 32.3 G/DL (ref 31–37)
MCV RBC AUTO: 92.4 FL (ref 74–97)
MONOCYTES # BLD: 0.5 K/UL (ref 0.05–1.2)
MONOCYTES NFR BLD: 8 % (ref 3–10)
NEUTS SEG # BLD: 3.6 K/UL (ref 1.8–8)
NEUTS SEG NFR BLD: 59 % (ref 40–73)
PLATELET # BLD AUTO: 237 K/UL (ref 135–420)
PMV BLD AUTO: 10.5 FL (ref 9.2–11.8)
POTASSIUM SERPL-SCNC: 3.4 MMOL/L (ref 3.5–5.5)
PROT SERPL-MCNC: 6.7 G/DL (ref 6.4–8.2)
RBC # BLD AUTO: 4.62 M/UL (ref 4.2–5.3)
SODIUM SERPL-SCNC: 141 MMOL/L (ref 136–145)
TROPONIN I BLD-MCNC: <0.04 NG/ML (ref 0–0.08)
TROPONIN I SERPL-MCNC: <0.02 NG/ML (ref 0–0.04)
WBC # BLD AUTO: 6.1 K/UL (ref 4.6–13.2)

## 2019-03-31 PROCEDURE — 85379 FIBRIN DEGRADATION QUANT: CPT

## 2019-03-31 PROCEDURE — 83735 ASSAY OF MAGNESIUM: CPT

## 2019-03-31 PROCEDURE — 93005 ELECTROCARDIOGRAM TRACING: CPT

## 2019-03-31 PROCEDURE — 85025 COMPLETE CBC W/AUTO DIFF WBC: CPT

## 2019-03-31 PROCEDURE — 84484 ASSAY OF TROPONIN QUANT: CPT

## 2019-03-31 PROCEDURE — 80053 COMPREHEN METABOLIC PANEL: CPT

## 2019-03-31 PROCEDURE — 83880 ASSAY OF NATRIURETIC PEPTIDE: CPT

## 2019-03-31 PROCEDURE — 99284 EMERGENCY DEPT VISIT MOD MDM: CPT

## 2019-03-31 PROCEDURE — 71046 X-RAY EXAM CHEST 2 VIEWS: CPT

## 2019-03-31 PROCEDURE — 82550 ASSAY OF CK (CPK): CPT

## 2019-03-31 NOTE — ED TRIAGE NOTES
C/o chest pain describes as tightness and achy for a couple of days, states \"hands are pulsating\" and worse on left side. Pain is constant. Last seen by cardiology Dec 2018. Also c/o left calf pain. States she took her SL ntg and had some relief.

## 2019-03-31 NOTE — DISCHARGE INSTRUCTIONS
You were seen and evaluated in the Emergency Department. Please understand that your work up is not all encompassing and you should follow up with your primary care physician for further management and continuity of care. Please return to Emergency Department or seek medical attention immediately if you have acute worsening in your symptoms or develop chest pain, shortness of breath, repeated vomiting, fever, altered level of consciousness, coughing up blood, or start sweating and feel clammy. If you were prescribed any medicine for home, please take as prescribed by your health-care provider. If you were given any follow-up appointments or numbers to call, please do so as instructed. Avoid any tobacco products or excessive alcohol. Patient Education        Chest Pain: Care Instructions  Your Care Instructions    There are many things that can cause chest pain. Some are not serious and will get better on their own in a few days. But some kinds of chest pain need more testing and treatment. Your doctor may have recommended a follow-up visit in the next 8 to 12 hours. If you are not getting better, you may need more tests or treatment. Even though your doctor has released you, you still need to watch for any problems. The doctor carefully checked you, but sometimes problems can develop later. If you have new symptoms or if your symptoms do not get better, get medical care right away. If you have worse or different chest pain or pressure that lasts more than 5 minutes or you passed out (lost consciousness), call 911 or seek other emergency help right away. A medical visit is only one step in your treatment. Even if you feel better, you still need to do what your doctor recommends, such as going to all suggested follow-up appointments and taking medicines exactly as directed. This will help you recover and help prevent future problems. How can you care for yourself at home?   · Rest until you feel better. · Take your medicine exactly as prescribed. Call your doctor if you think you are having a problem with your medicine. · Do not drive after taking a prescription pain medicine. When should you call for help? Call 911 if:    · You passed out (lost consciousness).     · You have severe difficulty breathing.     · You have symptoms of a heart attack. These may include:  ? Chest pain or pressure, or a strange feeling in your chest.  ? Sweating. ? Shortness of breath. ? Nausea or vomiting. ? Pain, pressure, or a strange feeling in your back, neck, jaw, or upper belly or in one or both shoulders or arms. ? Lightheadedness or sudden weakness. ? A fast or irregular heartbeat. After you call 911, the  may tell you to chew 1 adult-strength or 2 to 4 low-dose aspirin. Wait for an ambulance. Do not try to drive yourself.    Call your doctor today if:    · You have any trouble breathing.     · Your chest pain gets worse.     · You are dizzy or lightheaded, or you feel like you may faint.     · You are not getting better as expected.     · You are having new or different chest pain. Where can you learn more? Go to http://viral-anna.info/. Enter A120 in the search box to learn more about \"Chest Pain: Care Instructions. \"  Current as of: September 23, 2018  Content Version: 11.9  © 3755-3937 [employer]. Care instructions adapted under license by [employer] (which disclaims liability or warranty for this information). If you have questions about a medical condition or this instruction, always ask your healthcare professional. [address] any warranty or liability for your use of this information.

## 2019-03-31 NOTE — ED PROVIDER NOTES
EMERGENCY DEPARTMENT HISTORY AND PHYSICAL EXAM 
 
Date: 3/31/2019 Patient Name: Stiven Osullivan History of Presenting Illness Chief Complaint Patient presents with  Chest Pain History Provided By: Patient Additional History (Context):  
Stiven Osullivan is a 66 y.o. female with PMHX CAD status post PCI with Dr. Kapil Koehler presents to the emergency department C/O 1 week of left-sided pressure-like chest pain that radiates down to her left upper extremity and left lower extremity. Also reports intermittent shortness of breath. Pt denies fever, cough, abdominal pain, nausea, vomiting, and any other sxs or complaints. Patient denies any prior history of DVT or PE. She does report some calf \"pulsating\" however denies any pain. PCP: Tereza Patel MD 
 
Current Outpatient Medications Medication Sig Dispense Refill  nitroglycerin (NITROSTAT) 0.4 mg SL tablet 1 tablet by SubLINGual route every five (5) minutes as needed for Chest Pain (total of 3 tabs for one episode of pain). 75 tablet 3  
 fluticasone (FLONASE ALLERGY RELIEF) 50 mcg/actuation nasal spray 2 Sprays by Both Nostrils route daily.  raNITIdine (ZANTAC 75) 75 mg tablet Take 75 mg by mouth two (2) times a day.  cyanocobalamin 1,000 mcg tablet Take 1,000 mcg by mouth daily.  furosemide (LASIX) 20 mg tablet take 1 tablet by mouth once daily 90 tablet 3  
 atenolol (TENORMIN) 25 mg tablet TAKE 1/2 TABLET BY MOUTH EVERY DAY 90 tablet 3  
 rosuvastatin (CRESTOR) 10 mg tablet Take 1 tablet by mouth daily. 90 tablet 3  Cholecalciferol, Vitamin D3, 50,000 unit cap Take  by mouth every Monday.  montelukast (SINGULAIR) 10 mg tablet Take 10 mg by mouth daily.  desloratadine (CLARINEX) 5 mg disintegrating tablet Take 5 mg by mouth daily.  azelastine (ASTELIN) 137 mcg nasal spray 1 Spray two (2) times a day. Use in each nostril as directed  LORazepam (ATIVAN) 0.5 mg tablet Take  by mouth nightly.  potassium chloride SR (KLOR-CON 10) 10 mEq tablet Take 10 mEq by mouth daily.  aspirin 81 mg tablet Take 81 mg by mouth daily. Past History Past Medical History: 
Past Medical History:  
Diagnosis Date  CAD (coronary artery disease)  Chest pain, unspecified 2012  Coronary atherosclerosis of native coronary artery  Depression  Epistaxis 2012  Fatigue 2012  GERD (gastroesophageal reflux disease)  Ill-defined condition   
 allergies  Ill-defined condition   
 anal mole and cyst  
 Left arm pain 2014  Nausea & vomiting  Other and unspecified hyperlipidemia 2011  Other and unspecified hyperlipidemia  Ovarian cyst   
 
 
Past Surgical History: 
Past Surgical History:  
Procedure Laterality Date  HX APPENDECTOMY  HX BREAST BIOPSY    
 right cyst  
 HX CHOLECYSTECTOMY  HX CORONARY STENT PLACEMENT  11  
 stent mid right coronary artery stenosis  HX GYN    
 dysplasia  HX HEART CATHETERIZATION    
 stent placement mid coronary artery  HX HYSTERECTOMY  HX KNEE ARTHROSCOPY    
 left  HX OOPHORECTOMY  HX TONSILLECTOMY Family History: 
Family History Problem Relation Age of Onset  Coronary Artery Disease Mother 46  
 Heart Attack Mother 46 Social History: 
Social History Tobacco Use  Smoking status: Former Smoker Packs/day: 0.80 Years: 30.00 Pack years: 24.00 Types: Cigarettes Last attempt to quit: 2011 Years since quittin.8  Smokeless tobacco: Never Used Substance Use Topics  Alcohol use: No  
  Comment: ross yearly  Drug use: No  
 
 
Allergies: Allergies Allergen Reactions  Zocor [Simvastatin] Hives  Biaxin [Clarithromycin] Other (comments) Whelps  Ciprofloxacin Rash  Pcn [Penicillins] Rash  Plavix [Clopidogrel] Rash  Wellbutrin [Bupropion Hcl] Other (comments) Eve Review of Systems Review of Systems Physical Exam  
 
Vitals:  
 03/31/19 1607 03/31/19 1800 03/31/19 1815 BP: 108/50 99/54 95/51 Pulse: 72 (!) 58 (!) 56 Resp: 16 16 15 Temp: 98.3 °F (36.8 °C) SpO2: 99% 99% 96% Weight: 63.5 kg (140 lb) Height: 5' 7\" (1.702 m) Physical Exam 
 
Nursing note and vitals reviewed Constitutional: Well appearing middle age [de-identified] female, no acute distress Head: Normocephalic, Atraumatic Eyes: Pupils are equal, round, and reactive to light, EOMI Neck: Supple, non-tender Cardiovascular: Regular rate and rhythm, no murmurs, rubs, or gallops Chest: Normal work of breathing and chest excursion bilaterally Lungs: Clear to ausculation bilaterally, no wheezes, no rhonchi Abdomen: Soft, non tender, non distended, normoactive bowel sounds Back: No evidence of trauma or deformity Extremities: No evidence of trauma or deformity, no LE edema Skin: Warm and dry, normal cap refill Neuro: Alert and appropriate, CN intact, normal speech, moving all 4 extremities freely and symmetrically Psychiatric: Normal mood and affect Diagnostic Study Results Labs - Recent Results (from the past 12 hour(s)) EKG, 12 LEAD, INITIAL Collection Time: 03/31/19  4:15 PM  
Result Value Ref Range Ventricular Rate 75 BPM  
 Atrial Rate 75 BPM  
 P-R Interval 160 ms QRS Duration 86 ms  
 Q-T Interval 376 ms QTC Calculation (Bezet) 419 ms Calculated P Axis 75 degrees Calculated R Axis 75 degrees Calculated T Axis 71 degrees Diagnosis Normal sinus rhythm Possible Left atrial enlargement ST abnormality, possible digitalis effect Abnormal ECG When compared with ECG of 26-DEC-2018 21:29, No significant change was found CBC WITH AUTOMATED DIFF Collection Time: 03/31/19  4:38 PM  
Result Value Ref Range WBC 6.1 4.6 - 13.2 K/uL  
 RBC 4.62 4.20 - 5.30 M/uL HGB 13.8 12.0 - 16.0 g/dL HCT 42.7 35.0 - 45.0 % MCV 92.4 74.0 - 97.0 FL  
 MCH 29.9 24.0 - 34.0 PG  
 MCHC 32.3 31.0 - 37.0 g/dL  
 RDW 13.4 11.6 - 14.5 % PLATELET 205 963 - 014 K/uL MPV 10.5 9.2 - 11.8 FL  
 NEUTROPHILS 59 40 - 73 % LYMPHOCYTES 30 21 - 52 % MONOCYTES 8 3 - 10 % EOSINOPHILS 2 0 - 5 % BASOPHILS 1 0 - 2 %  
 ABS. NEUTROPHILS 3.6 1.8 - 8.0 K/UL  
 ABS. LYMPHOCYTES 1.8 0.9 - 3.6 K/UL  
 ABS. MONOCYTES 0.5 0.05 - 1.2 K/UL  
 ABS. EOSINOPHILS 0.1 0.0 - 0.4 K/UL  
 ABS. BASOPHILS 0.0 0.0 - 0.1 K/UL  
 DF AUTOMATED METABOLIC PANEL, COMPREHENSIVE Collection Time: 03/31/19  4:38 PM  
Result Value Ref Range Sodium 141 136 - 145 mmol/L Potassium 3.4 (L) 3.5 - 5.5 mmol/L Chloride 106 100 - 108 mmol/L  
 CO2 29 21 - 32 mmol/L Anion gap 6 3.0 - 18 mmol/L Glucose 99 74 - 99 mg/dL BUN 14 7.0 - 18 MG/DL Creatinine 0.99 0.6 - 1.3 MG/DL  
 BUN/Creatinine ratio 14 12 - 20 GFR est AA >60 >60 ml/min/1.73m2 GFR est non-AA 58 (L) >60 ml/min/1.73m2 Calcium 9.1 8.5 - 10.1 MG/DL Bilirubin, total 0.3 0.2 - 1.0 MG/DL  
 ALT (SGPT) 24 13 - 56 U/L  
 AST (SGOT) 24 15 - 37 U/L Alk. phosphatase 123 (H) 45 - 117 U/L Protein, total 6.7 6.4 - 8.2 g/dL Albumin 3.8 3.4 - 5.0 g/dL Globulin 2.9 2.0 - 4.0 g/dL A-G Ratio 1.3 0.8 - 1.7 D DIMER Collection Time: 03/31/19  4:38 PM  
Result Value Ref Range D DIMER 0.40 <0.46 ug/ml(FEU) MAGNESIUM Collection Time: 03/31/19  4:38 PM  
Result Value Ref Range Magnesium 2.4 1.6 - 2.6 mg/dL NT-PRO BNP Collection Time: 03/31/19  4:38 PM  
Result Value Ref Range NT pro- 0 - 900 PG/ML  
CARDIAC PANEL,(CK, CKMB & TROPONIN) Collection Time: 03/31/19  4:38 PM  
Result Value Ref Range  (H) 26 - 192 U/L  
 CK - MB 4.0 (H) <3.6 ng/ml CK-MB Index 1.3 0.0 - 4.0 % Troponin-I, QT <0.02 0.0 - 0.045 NG/ML  
POC TROPONIN-I Collection Time: 03/31/19  6:34 PM  
Result Value Ref Range Troponin-I (POC) <0.04 0.00 - 0.08 ng/mL Radiologic Studies -  
XR CHEST PA LAT    (Results Pending) 6:58 PM 
RADIOLOGY FINDINGS Chest x-ray shows hyperinflation but no acute process Pending review by Radiologist 
Recorded by Bolivar Quevedo,  
 
 
CT Results  (Last 48 hours) None CXR Results  (Last 48 hours) None Medical Decision Making I am the first provider for this patient. I reviewed the vital signs, available nursing notes, past medical history, past surgical history, family history and social history. Vital Signs-Reviewed the patient's vital signs. Pulse Oximetry Analysis -99 % on room air Cardiac Monitor: 
Rate: 62 bpm 
Rhythm: Regular EKG interpretation: (Preliminary) 4:24 PM  
Normal sinus rhythm 75 bpm.  QTc 490 ms. No acute ST elevations. Records Reviewed: Nursing Notes and Old Medical Records Provider Notes:  
66 y.o. female with a history of CAD status post PCI, patient of Dr. Kenney Rosas, last cardiology appointment December 2018 presenting with left-sided chest pain radiating down her arm and lower extremity. On exam patient is well-appearing with appropriate vital signs. Will evaluate for ACS and obtain cardiac enzymes. Although the patient is low risk Wells, with no PE/DVT risk factors, she cannot be PERC out secondary to age. Will obtain d-dimer to evaluate for PE although low on my differential. 
 
Procedures: 
Procedures ED Course:  
4:24 PM 
 Initial assessment performed. The patients presenting problems have been discussed, and they are in agreement with the care plan formulated and outlined with them. I have encouraged them to ask questions as they arise throughout their visit. 6:57 PM 
Patient's initial set of cardiac enzymes negative. D-dimer within normal limits. Repeat point-of-care troponin was also negative. Chest x-ray showing no acute process.   Patient encouraged to follow-up with Dr. Carrillo Mcgee her cardiologist tomorrow for follow-up. Diagnosis and Disposition DISCHARGE NOTE: 
6:58 PM 
Chris Peraza's  results have been reviewed with her. She has been counseled regarding her diagnosis, treatment, and plan. She verbally conveys understanding and agreement of the signs, symptoms, diagnosis, treatment and prognosis and additionally agrees to follow up as discussed. She also agrees with the care-plan and conveys that all of her questions have been answered. I have also provided discharge instructions for her that include: educational information regarding their diagnosis and treatment, and list of reasons why they would want to return to the ED prior to their follow-up appointment, should her condition change. She has been provided with education for proper emergency department utilization. CLINICAL IMPRESSION: 
 
1. Chest pain, unspecified type 2. Coronary artery disease of native heart with stable angina pectoris, unspecified vessel or lesion type ([address] PLAN: 
1. D/C Home 2. Current Discharge Medication List  
  
 
3. Follow-up Information Follow up With Specialties Details Why Contact Info Vannessa Hull MD Family Practice Schedule an appointment as soon as possible for a visit in 2 days  [address] [place] [address] 
[phone] Asiya Avelar MD Cardiology Schedule an appointment as soon as possible for a visit in 2 days  [address] [facility] [address] [place] [address] 
[phone] [facility] EMERGENCY DEPT Emergency Medicine  As needed if symptoms worsen [address] [place] [address] 
[phone]  
  
 
____________________________________ Please note that this dictation was completed with [employer], the [employer]se voice recognition software.   Quite often unanticipated grammatical, syntax, homophones, and other interpretive errors are inadvertently transcribed by the computer software. Please disregard these errors. Please excuse any errors that have escaped final proofreading.

## 2019-05-14 LAB
ATRIAL RATE: 73 BPM
ATRIAL RATE: 75 BPM
CALCULATED P AXIS, ECG09: 73 DEGREES
CALCULATED P AXIS, ECG09: 75 DEGREES
CALCULATED R AXIS, ECG10: 71 DEGREES
CALCULATED R AXIS, ECG10: 75 DEGREES
CALCULATED T AXIS, ECG11: 65 DEGREES
CALCULATED T AXIS, ECG11: 71 DEGREES
DIAGNOSIS, 93000: NORMAL
DIAGNOSIS, 93000: NORMAL
P-R INTERVAL, ECG05: 150 MS
P-R INTERVAL, ECG05: 160 MS
Q-T INTERVAL, ECG07: 376 MS
Q-T INTERVAL, ECG07: 382 MS
QRS DURATION, ECG06: 86 MS
QRS DURATION, ECG06: 92 MS
QTC CALCULATION (BEZET), ECG08: 419 MS
QTC CALCULATION (BEZET), ECG08: 420 MS
VENTRICULAR RATE, ECG03: 73 BPM
VENTRICULAR RATE, ECG03: 75 BPM

## 2019-11-21 ENCOUNTER — HOSPITAL ENCOUNTER (OUTPATIENT)
Dept: LAB | Age: 59
Discharge: HOME | End: 2019-11-21
Attending: INTERNAL MEDICINE
Payer: COMMERCIAL

## 2019-11-21 LAB
ALBUMIN SERPL-MCNC: 3.6 G/DL (ref 3.4–5)
ALBUMIN/GLOB SERPL: 1.2 {RATIO} (ref 0.8–1.7)
ALP SERPL-CCNC: 101 U/L (ref 45–117)
ALT SERPL-CCNC: 56 U/L (ref 13–56)
ANION GAP SERPL CALC-SCNC: 4 MMOL/L (ref 3–18)
AST SERPL-CCNC: 53 U/L (ref 10–38)
BASOPHILS # BLD: 0 K/UL (ref 0–0.1)
BASOPHILS NFR BLD: 0 % (ref 0–2)
BILIRUB SERPL-MCNC: 0.4 MG/DL (ref 0.2–1)
BUN SERPL-MCNC: 16 MG/DL (ref 7–18)
BUN/CREAT SERPL: 15 (ref 12–20)
CALCIUM SERPL-MCNC: 9.4 MG/DL (ref 8.5–10.1)
CHLORIDE SERPL-SCNC: 102 MMOL/L (ref 100–111)
CO2 SERPL-SCNC: 33 MMOL/L (ref 21–32)
CREAT SERPL-MCNC: 1.05 MG/DL (ref 0.6–1.3)
DIFFERENTIAL METHOD BLD: NORMAL
EOSINOPHIL # BLD: 0.1 K/UL (ref 0–0.4)
EOSINOPHIL NFR BLD: 1 % (ref 0–5)
ERYTHROCYTE [DISTWIDTH] IN BLOOD BY AUTOMATED COUNT: 13.7 % (ref 11.6–14.5)
GLOBULIN SER CALC-MCNC: 3 G/DL (ref 2–4)
GLUCOSE SERPL-MCNC: 91 MG/DL (ref 74–99)
HCT VFR BLD AUTO: 43.8 % (ref 35–45)
HGB BLD-MCNC: 13.8 G/DL (ref 12–16)
INR PPP: 1 (ref 0.8–1.2)
LYMPHOCYTES # BLD: 1.6 K/UL (ref 0.9–3.6)
LYMPHOCYTES NFR BLD: 24 % (ref 21–52)
MCH RBC QN AUTO: 29.5 PG (ref 24–34)
MCHC RBC AUTO-ENTMCNC: 31.5 G/DL (ref 31–37)
MCV RBC AUTO: 93.6 FL (ref 74–97)
MONOCYTES # BLD: 0.4 K/UL (ref 0.05–1.2)
MONOCYTES NFR BLD: 6 % (ref 3–10)
NEUTS SEG # BLD: 4.5 K/UL (ref 1.8–8)
NEUTS SEG NFR BLD: 69 % (ref 40–73)
PLATELET # BLD AUTO: 272 K/UL (ref 135–420)
PMV BLD AUTO: 10.7 FL (ref 9.2–11.8)
POTASSIUM SERPL-SCNC: 4.4 MMOL/L (ref 3.5–5.5)
PROT SERPL-MCNC: 6.6 G/DL (ref 6.4–8.2)
PROTHROMBIN TIME: 12.5 SEC (ref 11.5–15.2)
RBC # BLD AUTO: 4.68 M/UL (ref 4.2–5.3)
SODIUM SERPL-SCNC: 139 MMOL/L (ref 136–145)
WBC # BLD AUTO: 6.6 K/UL (ref 4.6–13.2)

## 2019-11-21 PROCEDURE — 85610 PROTHROMBIN TIME: CPT

## 2019-11-21 PROCEDURE — 86038 ANTINUCLEAR ANTIBODIES: CPT

## 2019-11-21 PROCEDURE — 82784 ASSAY IGA/IGD/IGG/IGM EACH: CPT

## 2019-11-21 PROCEDURE — 36415 COLL VENOUS BLD VENIPUNCTURE: CPT

## 2019-11-21 PROCEDURE — 85025 COMPLETE CBC W/AUTO DIFF WBC: CPT

## 2019-11-21 PROCEDURE — 80053 COMPREHEN METABOLIC PANEL: CPT

## 2019-11-21 PROCEDURE — 83520 IMMUNOASSAY QUANT NOS NONAB: CPT

## 2019-11-22 LAB
ANA TITR SER IF: NEGATIVE {TITER}
PLEASE NOTE, 734348: NORMAL

## 2019-11-24 LAB
IGA SERPL-MCNC: 143 MG/DL (ref 87–352)
IGE SERPL-ACNC: 59 IU/ML (ref 6–495)
IGG SERPL-MCNC: 829 MG/DL (ref 700–1600)
IGM SERPL-MCNC: 70 MG/DL (ref 26–217)

## 2019-11-25 LAB
C-ANCA TITR SER IF: NORMAL TITER
FAX TO INFO,FAXT: NORMAL
FAX TO NUMBER,FAXN: NORMAL
MYELOPEROXIDASE AB SER IA-ACNC: <9 U/ML (ref 0–9)
P-ANCA ATYPICAL TITR SER IF: NORMAL TITER
P-ANCA TITR SER IF: NORMAL TITER
PROTEINASE3 AB SER IA-ACNC: <3.5 U/ML (ref 0–3.5)

## 2019-11-25 RX ORDER — FAMOTIDINE 20 MG/1
20 TABLET, FILM COATED ORAL DAILY
COMMUNITY

## 2019-11-25 RX ORDER — CLINDAMYCIN HYDROCHLORIDE 75 MG/1
150 CAPSULE ORAL 4 TIMES DAILY
COMMUNITY
End: 2021-06-12

## 2019-11-26 ENCOUNTER — HOSPITAL ENCOUNTER (OUTPATIENT)
Age: 59
Discharge: HOME | End: 2019-11-26
Attending: INTERNAL MEDICINE | Admitting: INTERNAL MEDICINE
Payer: COMMERCIAL

## 2019-11-26 VITALS
HEIGHT: 67 IN | HEART RATE: 54 BPM | TEMPERATURE: 97.8 F | OXYGEN SATURATION: 100 % | SYSTOLIC BLOOD PRESSURE: 92 MMHG | RESPIRATION RATE: 16 BRPM | BODY MASS INDEX: 20.55 KG/M2 | DIASTOLIC BLOOD PRESSURE: 46 MMHG | WEIGHT: 130.9 LBS

## 2019-11-26 DIAGNOSIS — I25.10 CAD (CORONARY ARTERY DISEASE): ICD-10-CM

## 2019-11-26 PROCEDURE — 77030029997 HC DEV COM RDL R BND TELE -B: Performed by: INTERNAL MEDICINE

## 2019-11-26 PROCEDURE — 77030008543 HC TBNG MON PRSS MRTM -A: Performed by: INTERNAL MEDICINE

## 2019-11-26 PROCEDURE — 77030004522 HC CATH ANGI DX EXPO BSC -A: Performed by: INTERNAL MEDICINE

## 2019-11-26 PROCEDURE — 74011636320 HC RX REV CODE- 636/320: Performed by: INTERNAL MEDICINE

## 2019-11-26 PROCEDURE — 74011250636 HC RX REV CODE- 250/636: Performed by: INTERNAL MEDICINE

## 2019-11-26 PROCEDURE — 93458 L HRT ARTERY/VENTRICLE ANGIO: CPT | Performed by: INTERNAL MEDICINE

## 2019-11-26 PROCEDURE — 74011000250 HC RX REV CODE- 250: Performed by: INTERNAL MEDICINE

## 2019-11-26 PROCEDURE — 99152 MOD SED SAME PHYS/QHP 5/>YRS: CPT | Performed by: INTERNAL MEDICINE

## 2019-11-26 PROCEDURE — C1769 GUIDE WIRE: HCPCS | Performed by: INTERNAL MEDICINE

## 2019-11-26 PROCEDURE — 99153 MOD SED SAME PHYS/QHP EA: CPT | Performed by: INTERNAL MEDICINE

## 2019-11-26 PROCEDURE — 77030013797 HC KT TRNSDUC PRSSR EDWD -A: Performed by: INTERNAL MEDICINE

## 2019-11-26 PROCEDURE — C1894 INTRO/SHEATH, NON-LASER: HCPCS | Performed by: INTERNAL MEDICINE

## 2019-11-26 RX ORDER — LIDOCAINE HYDROCHLORIDE 10 MG/ML
INJECTION INFILTRATION; PERINEURAL AS NEEDED
Status: DISCONTINUED | OUTPATIENT
Start: 2019-11-26 | End: 2019-11-26 | Stop reason: HOSPADM

## 2019-11-26 RX ORDER — FENTANYL CITRATE 50 UG/ML
INJECTION, SOLUTION INTRAMUSCULAR; INTRAVENOUS AS NEEDED
Status: DISCONTINUED | OUTPATIENT
Start: 2019-11-26 | End: 2019-11-26 | Stop reason: HOSPADM

## 2019-11-26 RX ORDER — FLUTICASONE PROPIONATE AND SALMETEROL 100; 50 UG/1; UG/1
1 POWDER RESPIRATORY (INHALATION) 2 TIMES DAILY
COMMUNITY
End: 2021-06-12

## 2019-11-26 RX ORDER — SODIUM CHLORIDE 0.9 % (FLUSH) 0.9 %
5-40 SYRINGE (ML) INJECTION EVERY 8 HOURS
Status: DISCONTINUED | OUTPATIENT
Start: 2019-11-26 | End: 2019-11-26 | Stop reason: HOSPADM

## 2019-11-26 RX ORDER — VERAPAMIL HYDROCHLORIDE 2.5 MG/ML
INJECTION, SOLUTION INTRAVENOUS AS NEEDED
Status: DISCONTINUED | OUTPATIENT
Start: 2019-11-26 | End: 2019-11-26 | Stop reason: HOSPADM

## 2019-11-26 RX ORDER — MIDAZOLAM HYDROCHLORIDE 1 MG/ML
INJECTION, SOLUTION INTRAMUSCULAR; INTRAVENOUS AS NEEDED
Status: DISCONTINUED | OUTPATIENT
Start: 2019-11-26 | End: 2019-11-26 | Stop reason: HOSPADM

## 2019-11-26 RX ORDER — ISOSORBIDE DINITRATE 5 MG/1
5 TABLET ORAL 2 TIMES DAILY
COMMUNITY
End: 2021-06-12

## 2019-11-26 RX ORDER — HEPARIN SODIUM 1000 [USP'U]/ML
INJECTION, SOLUTION INTRAVENOUS; SUBCUTANEOUS AS NEEDED
Status: DISCONTINUED | OUTPATIENT
Start: 2019-11-26 | End: 2019-11-26 | Stop reason: HOSPADM

## 2019-11-26 RX ORDER — HEPARIN SODIUM 200 [USP'U]/100ML
INJECTION, SOLUTION INTRAVENOUS
Status: COMPLETED | OUTPATIENT
Start: 2019-11-26 | End: 2019-11-26

## 2019-11-26 RX ORDER — SODIUM CHLORIDE 0.9 % (FLUSH) 0.9 %
5-40 SYRINGE (ML) INJECTION AS NEEDED
Status: DISCONTINUED | OUTPATIENT
Start: 2019-11-26 | End: 2019-11-26 | Stop reason: HOSPADM

## 2019-11-26 RX ORDER — SODIUM CHLORIDE 9 MG/ML
50 INJECTION, SOLUTION INTRAVENOUS CONTINUOUS
Status: DISCONTINUED | OUTPATIENT
Start: 2019-11-26 | End: 2019-11-26 | Stop reason: HOSPADM

## 2019-11-26 RX ADMIN — SODIUM CHLORIDE 50 ML/HR: 900 INJECTION, SOLUTION INTRAVENOUS at 09:07

## 2019-11-26 NOTE — Clinical Note
TRANSFER - OUT REPORT:     Verbal report given to: YANELY. Report consisted of patient's Situation, Background, Assessment and   Recommendations(SBAR). Opportunity for questions and clarification was provided. Patient transported with a Registered Nurse and [address] [place] [address] / Patient [facility]. Patient transported to: CARE.

## 2019-11-26 NOTE — DISCHARGE INSTRUCTIONS
DISCHARGE SUMMARY from Nurse    PATIENT INSTRUCTIONS:    After general anesthesia or intravenous sedation, for 24 hours or while taking prescription Narcotics:  · Limit your activities  · Do not drive and operate hazardous machinery  · Do not make important personal or business decisions  · Do  not drink alcoholic beverages  · If you have not urinated within 8 hours after discharge, please contact your surgeon on call. Report the following to your surgeon:  · Excessive pain, swelling, redness or odor of or around the surgical area  · Temperature over 100.5  · Nausea and vomiting lasting longer than 4 hours or if unable to take medications  · Any signs of decreased circulation or nerve impairment to extremity: change in color, persistent  numbness, tingling, coldness or increase pain  · Any questions    What to do at Home:  Recommended activity: {discharge activity:84524}, ***    If you experience any of the following symptoms ***, please follow up with ***. *  Please give a list of your current medications to your Primary Care Provider. *  Please update this list whenever your medications are discontinued, doses are      changed, or new medications (including over-the-counter products) are added. *  Please carry medication information at all times in case of emergency situations. These are general instructions for a healthy lifestyle:    No smoking/ No tobacco products/ Avoid exposure to second hand smoke  Surgeon General's Warning:  Quitting smoking now greatly reduces serious risk to your health.     Obesity, smoking, and sedentary lifestyle greatly increases your risk for illness    A healthy diet, regular physical exercise & weight monitoring are important for maintaining a healthy lifestyle    You may be retaining fluid if you have a history of heart failure or if you experience any of the following symptoms:  Weight gain of 3 pounds or more overnight or 5 pounds in a week, increased swelling in our hands or feet or shortness of breath while lying flat in bed. Please call your doctor as soon as you notice any of these symptoms; do not wait until your next office visit. The discharge information has been reviewed with the {PATIENT PARENT GUARDIAN:21168}. The {PATIENT PARENT GUARDIAN:42900} verbalized understanding. Discharge medications reviewed with the {Dishcarge meds reviewed CYP:16881} and appropriate educational materials and side effects teaching were provided. ___________________________________________________________________________________________________________________________________                 Cardiac Catheterization/Angiography Discharge Instructions    *Check the puncture site frequently for swelling or bleeding. If you see any bleeding, lie down and apply pressure over the area with a clean town or washcloth. Notify your doctor for any redness, swelling, drainage or oozing from the puncture site. Notify your doctor for any fever or chills. *If the leg or arm with the puncture becomes cold, numb or painful, [facility]    *Activity should be limited for the next 48 hours. Climb stairs as little as possible and avoid any stooping, bending or strenuous activity for 48 hours. No heavy lifting (anything over 10 pounds) for three days. *Do not drive for 48 hours. *You may resume your usual diet. Drink more fluids than usual.    *Have a responsible person drive you home and stay with you for at least 24 hours after your heart catheterization/angiography. *You may remove the bandage from your {ARM/GROIN:70846} in 24 hours. You may shower in 24 hours. No tub baths, hot tubs or swimming for one week. Do not place any lotions, creams, powders, ointments over the puncture site for one week. You may place a clean band-aid over the puncture site each day for 5 days. Change this daily.       Patient {ARMBANDS:20124}

## 2019-11-26 NOTE — PROGRESS NOTES
TR band released sterile 2x2 & tegaderm applied with armboard, no bleeding or swelling. 1404 Bleeding noted from radial, dressing saturated,pressure held x 15 minutes. New dressing applied no further bleeding. 1430 No bleeding from left wrist. NV checks WNL.  Discharge instructions reviewed, verbalized understanding. Discharged home in care of daughter via w/c. Denies pain. Left radial dressing intact & dry.  No bleeding or swelling

## 2019-11-26 NOTE — PROGRESS NOTES
Back from cardiac cath. Radial band intact to left wrist. No bleeding or swelling. denies pain. Diet given.

## 2019-11-26 NOTE — PROCEDURES
LHC and Coronary angiogram done via left radial approach. Coronary anatomy described below with noted coronary artery disease. LV gram was not done. LVEDP 7 mm hg. No significant gradient on pull back. Left main: Left main is short and is divides in LAD and  LCx. LAD: LAD has 50% stenosis in proximal part. The mid LAD has patent stent. The mid to distal LAD has 50% stenosis. The distal and apical LAD is small in caliber. The S1 is small caliber vessel with ostial 90% stenosis. The D1 is medium caliber vessel with luminal irregularities. LAD has corkscrew appearance. Circumflex: Proximal LCx has luminal irregularities. The mid LCx has log segmental stenosis 60%. LCx has corkscrew appearance. RCA: RCA is dominant vessel. Proximal RCA has diffuse luminal irregularities. The Mid RCA has 50% stenosis just prior to stent. Mid RCA has patent stent with mild mid instent stenosis. The distal RCA has 60% stenosis. RPDA and RPLA has luminal irregularities. The acute marginal  branch is medium caliber vessel and has luminal irregularities. RCA has corkscrew appearance. Patient tolerated procedure well. Scant blood loss. No complications. No specimen removed. Recommendation:    Medication considered:   Aspirin, beta blocker, ACEI, Nitrates and statin     CAD risk factor education  Diet education  Control cholesterol  Blood pressure control  Exercise education: Age and functional status appropriate. Consider evaluation for other sources of reported symptoms.

## 2019-11-26 NOTE — Clinical Note
Contrast Dose Calculator:   Patient's age: 64.   Patient's sex: Female. Patient weight (kg) = 59. Creatinine level (mg/dL) = 1.05. Creatinine clearance (mL/min): 55.06. Max Contrast dose per Creatinine Cl calculator = 123.88 mL.

## 2019-11-26 NOTE — H&P
Date of Surgery Update:  Flakito Watson was seen and examined. History and physical has been reviewed. The patient has been examined. There have been no significant clinical changes since the completion of the originally dated History and Physical.      Patient assessed and is candidate for moderate sedation.       Signed By: Nydia Palomino MD     November 26, 2019 12:02 PM

## 2019-11-26 NOTE — Clinical Note
TRANSFER - IN REPORT:     Verbal report received from: Darion . Report consisted of patient's Situation, Background, Assessment and   Recommendations(SBAR). Opportunity for questions and clarification was provided. Assessment completed upon patient's arrival to unit and care assumed. Patient transported with a Registered Nurse.

## 2019-11-26 NOTE — ROUTINE PROCESS
Cardiac Cath Lab:  Pre Procedure Chart Check Patients chart was accessed and reviewed for possible and/or scheduled procedure. Creatinine Clearance: 
Serum creatinine: 1.05 mg/dL 11/21/19 1108 Estimated creatinine clearance: 55.4 mL/min Total Contrast  Load: 
3 x estimated clearance amount=  166.2ml 
 
75% of Contrast Load: 0.75 x Total Contrast Load=    124.6ml No results for input(s): WBC, RBC, HCT, HGB, PLT, INR, APTT, PTP, NA, K, BUN, CREA, GFRAA, GFRNA, CA, MAG, CPK, CKMB, CKNDX, CKND1, TROPT, TROIQ, BNPP, BNP, HCTEXT, HGBEXT, PLTEXT, INREXT in the last 72 hours. No lab exists for component: DDIMER, GLUCOSE 
 
BMI: Body mass index is 20.5 kg/m². ALLERGIES:  
Allergies Allergen Reactions  Zocor [Simvastatin] Hives  Biaxin [Clarithromycin] Other (comments) Whelps  Ciprofloxacin Rash  Pcn [Penicillins] Rash  Plavix [Clopidogrel] Rash  Wellbutrin [Bupropion Hcl] Other (comments) Whelps Lines: 
  
  
Peripheral IV 11/26/19 Right Forearm (Active) Site Assessment Clean, dry, & intact 11/26/2019  9:06 AM  
Phlebitis Assessment 0 11/26/2019  9:06 AM  
Infiltration Assessment 0 11/26/2019  9:06 AM  
Dressing Status Clean, dry, & intact 11/26/2019  9:06 AM  
Dressing Type Tape;Transparent 11/26/2019  9:06 AM  
Hub Color/Line Status Blue 11/26/2019  9:06 AM  
Action Taken Open ports on tubing capped 11/26/2019  9:06 AM  
Alcohol Cap Used Yes 11/26/2019  9:06 AM  
 
  
 
History: 
 
Past Medical History:  
Diagnosis Date  Asthma  CAD (coronary artery disease)  Chest pain, unspecified 9/9/2012  Coronary atherosclerosis of native coronary artery  Depression  Epistaxis 12/11/2012  Fatigue 12/11/2012  GERD (gastroesophageal reflux disease)  Ill-defined condition   
 allergies  Ill-defined condition   
 anal mole and cyst  
 Left arm pain 8/7/2014  Nausea & vomiting  Other and unspecified hyperlipidemia 6/1/2011  Other and unspecified hyperlipidemia  Ovarian cyst   
 
Past Surgical History:  
Procedure Laterality Date  HX APPENDECTOMY  HX BREAST BIOPSY    
 right cyst  
 HX CHOLECYSTECTOMY  HX CORONARY STENT PLACEMENT  05/05/11  
 stent mid right coronary artery stenosis  HX GYN    
 dysplasia  HX HEART CATHETERIZATION  05/11  
 stent placement mid coronary artery  HX HYSTERECTOMY  HX KNEE ARTHROSCOPY    
 left  HX OOPHORECTOMY  HX TONSILLECTOMY Patient Active Problem List  
Diagnosis Code  GERD (gastroesophageal reflux disease) K21.9  Depression F32.9  Coronary atherosclerosis of native coronary artery I25.10  Other and unspecified hyperlipidemia E78.5  Chest pain, unspecified R07.9  Epistaxis R04.0  Fatigue R53.83  Left arm pain M79.602  Chest pain R07.9

## 2019-11-28 NOTE — DISCHARGE SUMMARY
Discharge Summary    Patient: Rufina Harris MRN: [id number]  CSN: [id number]    YOB: 1960  Age: 64 y.o. Sex: female    DOA: 11/26/2019 LOS:  LOS: 0 days   Discharge Date: 11/26/2019     Primary Care Provider:  Deangelo Almonte MD    Admission Diagnoses: CAD with angina     Discharge Diagnoses:  CAD  Problem List as of 11/26/2019 Date Reviewed: 8/27/2014          Codes Class Noted - Resolved    Chest pain ICD-10-CM: R07.9  ICD-9-CM: 786.50  8/27/2014 - Present        Left arm pain ICD-10-CM: M79.602  ICD-9-CM: 729.5  8/7/2014 - Present        Epistaxis ICD-10-CM: R04.0  ICD-9-CM: 784.7  12/11/2012 - Present        Fatigue ICD-10-CM: R53.83  ICD-9-CM: 780.79  12/11/2012 - Present        Chest pain, unspecified ICD-10-CM: R07.9  ICD-9-CM: 786.50  9/9/2012 - Present        Other and unspecified hyperlipidemia ICD-10-CM: E78.5  ICD-9-CM: 272.4  6/1/2011 - Present        Coronary atherosclerosis of native coronary artery ICD-10-CM: I25.10  ICD-9-CM: 414.01  Unknown - Present        GERD (gastroesophageal reflux disease) ICD-10-CM: K21.9  ICD-9-CM: 530.81  Unknown - Present        Depression ICD-10-CM: F32.9  ICD-9-CM: 311  Unknown - Present              Discharge Medications:     Discharge Medication List as of 11/26/2019  3:15 PM      CONTINUE these medications which have NOT CHANGED    Details   isosorbide dinitrate (ISORDIL) 5 mg tablet Take 5 mg by mouth two (2) times a day., Historical Med      fluticasone propion-salmeterol (ADVAIR DISKUS) 100-50 mcg/dose diskus inhaler Take 1 Puff by inhalation two (2) times a day., Historical Med      famotidine (PEPCID) 20 mg tablet Take 20 mg by mouth daily. , Historical Med      clindamycin (CLEOCIN) 75 mg capsule Take 150 mg by mouth four (4) times daily. , Historical Med      furosemide (LASIX) 20 mg tablet take 1 tablet by mouth once daily, Normal, Disp-90 tablet, R-3      atenolol (TENORMIN) 25 mg tablet TAKE 1/2 TABLET BY MOUTH EVERY DAY, Normal, Disp-90 tablet, R-3      rosuvastatin (CRESTOR) 10 mg tablet Take 1 tablet by mouth daily. , Normal, Disp-90 tablet, R-3      Cholecalciferol, Vitamin D3, 50,000 unit cap Take  by mouth every Monday. Takes every 3 weeks, Historical Med      montelukast (SINGULAIR) 10 mg tablet Take 10 mg by mouth daily. , Historical Med      desloratadine (CLARINEX) 5 mg disintegrating tablet Take 5 mg by mouth daily. Historical Med, 5 mg      LORazepam (ATIVAN) 0.5 mg tablet Take 0.25 mg by mouth nightly. Pt states she is weaning herself off, Historical Med      potassium chloride SR (KLOR-CON 10) 10 mEq tablet Take 10 mEq by mouth daily. , Historical Med      aspirin 81 mg tablet Take 81 mg by mouth daily. Historical Med, 81 mg      fluticasone (FLONASE ALLERGY RELIEF) 50 mcg/actuation nasal spray 2 Sprays by Both Nostrils route daily. , Historical Med      cyanocobalamin 1,000 mcg tablet Take 1,000 mcg by mouth daily. , Historical Med      nitroglycerin (NITROSTAT) 0.4 mg SL tablet 1 tablet by SubLINGual route every five (5) minutes as needed for Chest Pain (total of 3 tabs for one episode of pain). , Normal, Disp-75 tablet, R-3             Discharge Condition: Stable    Procedures : [facility] and coronary angiogram     Consults: None      PHYSICAL EXAM   Visit Vitals  BP 92/46   Pulse (!) 54   Temp 97.8 °F (36.6 °C)   Resp 16   Ht 5' 7\" (1.702 m)   Wt 59.4 kg (130 lb 14.4 oz)   SpO2 100%   Breastfeeding No   BMI 20.50 kg/m²     General: Awake, cooperative, no acute distress    HEENT: NC, Atraumatic. PERRLA, EOMI. Anicteric sclerae. Lungs:  CTA Bilaterally. No Wheezing/Rhonchi/Rales. Heart:  Regular  rhythm,  No murmur, No Rubs, No Gallops  Abdomen: Soft, Non distended, Non tender. +Bowel sounds,   Extremities: No c/c/e  Psych:   Not anxious or agitated. Neurologic:  No acute neurological deficits.                                      Admission HPI : See H/P    Hospital Course : patient came for outpatient LEFT heart catheterization, coronary angiogram plus or minus PCI. C and Coronary angiogram done via left radial approach.      Coronary anatomy described below with noted coronary artery disease.      LV gram was not done. LVEDP 7 mm hg. No significant gradient on pull back.     Left main: Left main is short and is divides in LAD and  LCx.      LAD: LAD has 50% stenosis in proximal part. The mid LAD has patent stent. The mid to distal LAD has 50% stenosis. The distal and apical LAD is small in caliber. The S1 is small caliber vessel with ostial 90% stenosis. The D1 is medium caliber vessel with luminal irregularities. LAD has corkscrew appearance.      Circumflex: Proximal LCx has luminal irregularities. The mid LCx has log segmental stenosis 60%. LCx has corkscrew appearance.      RCA: RCA is dominant vessel. Proximal RCA has diffuse luminal irregularities. The Mid RCA has 50% stenosis just prior to stent. Mid RCA has patent stent with mild mid instent stenosis. The distal RCA has 60% stenosis. RPDA and RPLA has luminal irregularities. The acute marginal  branch is medium caliber vessel and has luminal irregularities. RCA has corkscrew appearance.      Patient tolerated procedure well.      Scant blood loss. No complications. No specimen removed.      Recommendation:     Medication considered:   Aspirin, beta blocker, ACEI, Nitrates and statin      CAD risk factor education  Diet education  Control cholesterol  Blood pressure control  Exercise education: Age and functional status appropriate. Consider evaluation for other sources of reported symptoms. Activity: No driving for 24 hours, no weight lifting no more than 10 lbs from left hand for 1 week    Diet: Cardiac     Follow-up:  In cardiology clinic after 2 weeks    Disposition: Home    Minutes spent on discharge: 32 minutes       Labs: Results:       Chemistry No results for input(s): GLU, NA, K, CL, CO2, BUN, CREA, CA, AGAP, BUCR, TBIL, GPT, AP, TP, ALB, GLOB, AGRAT in the last 72 hours. CBC w/Diff No results for input(s): WBC, RBC, HGB, HCT, PLT, GRANS, LYMPH, EOS, HGBEXT, HCTEXT, PLTEXT in the last 72 hours. Cardiac Enzymes No results for input(s): CPK, CKND1, AMRIT in the last 72 hours. No lab exists for component: CKRMB, TROIP   Coagulation No results for input(s): PTP, INR, APTT, INREXT in the last 72 hours. Lipid Panel Lab Results   Component Value Date/Time    Cholesterol, total CANCELED 11/01/2012 12:07 PM    HDL Cholesterol CANCELED 11/01/2012 12:07 PM    LDL, calculated 84 08/08/2012 08:58 AM    VLDL, calculated 21 08/08/2012 08:58 AM    Triglyceride CANCELED 11/01/2012 12:07 PM    CHOL/HDL Ratio 2.8 03/14/2012 03:07 AM      BNP No results for input(s): BNPP in the last 72 hours. Liver Enzymes No results for input(s): TP, ALB, TBIL, AP, SGOT, GPT in the last 72 hours. No lab exists for component: DBIL   Thyroid Studies Lab Results   Component Value Date/Time    TSH 1.25 02/09/2012 09:10 AM            Significant Diagnostic Studies: No results found. No results found for this or any previous visit.         CC: Bran Tam MD

## 2021-06-12 ENCOUNTER — APPOINTMENT (OUTPATIENT)
Dept: GENERAL RADIOLOGY | Age: 61
DRG: 247 | End: 2021-06-12
Attending: EMERGENCY MEDICINE
Payer: COMMERCIAL

## 2021-06-12 ENCOUNTER — HOSPITAL ENCOUNTER (INPATIENT)
Age: 61
LOS: 2 days | Discharge: HOME | DRG: 247 | End: 2021-06-14
Attending: EMERGENCY MEDICINE | Admitting: FAMILY MEDICINE
Payer: COMMERCIAL

## 2021-06-12 DIAGNOSIS — I21.4 NSTEMI (NON-ST ELEVATED MYOCARDIAL INFARCTION) (HCC): ICD-10-CM

## 2021-06-12 DIAGNOSIS — I24.9 ACS (ACUTE CORONARY SYNDROME) (HCC): Primary | ICD-10-CM

## 2021-06-12 DIAGNOSIS — I25.10 CAD (CORONARY ARTERY DISEASE): ICD-10-CM

## 2021-06-12 PROBLEM — J45.909 ASTHMA: Status: ACTIVE | Noted: 2021-06-12

## 2021-06-12 LAB
ALBUMIN SERPL-MCNC: 4.2 G/DL (ref 3.4–5)
ALBUMIN/GLOB SERPL: 1.2 {RATIO} (ref 0.8–1.7)
ALP SERPL-CCNC: 113 U/L (ref 45–117)
ALT SERPL-CCNC: 24 U/L (ref 13–56)
ANION GAP SERPL CALC-SCNC: 5 MMOL/L (ref 3–18)
APTT PPP: 28.9 SEC (ref 23–36.4)
AST SERPL-CCNC: 29 U/L (ref 10–38)
BASOPHILS # BLD: 0 K/UL (ref 0–0.1)
BASOPHILS NFR BLD: 1 % (ref 0–2)
BILIRUB SERPL-MCNC: 0.3 MG/DL (ref 0.2–1)
BUN SERPL-MCNC: 17 MG/DL (ref 7–18)
BUN/CREAT SERPL: 18 (ref 12–20)
CALCIUM SERPL-MCNC: 9.9 MG/DL (ref 8.5–10.1)
CHLORIDE SERPL-SCNC: 106 MMOL/L (ref 100–111)
CK MB CFR SERPL CALC: 3.9 % (ref 0–4)
CK MB SERPL-MCNC: 6.5 NG/ML (ref 5–25)
CK SERPL-CCNC: 166 U/L (ref 26–192)
CO2 SERPL-SCNC: 31 MMOL/L (ref 21–32)
CREAT SERPL-MCNC: 0.93 MG/DL (ref 0.6–1.3)
D DIMER PPP FEU-MCNC: 0.36 UG/ML(FEU)
DIFFERENTIAL METHOD BLD: ABNORMAL
EOSINOPHIL # BLD: 0.1 K/UL (ref 0–0.4)
EOSINOPHIL NFR BLD: 1 % (ref 0–5)
ERYTHROCYTE [DISTWIDTH] IN BLOOD BY AUTOMATED COUNT: 13.2 % (ref 11.6–14.5)
GLOBULIN SER CALC-MCNC: 3.4 G/DL (ref 2–4)
GLUCOSE SERPL-MCNC: 109 MG/DL (ref 74–99)
HCT VFR BLD AUTO: 45.3 % (ref 35–45)
HGB BLD-MCNC: 14.9 G/DL (ref 12–16)
INR PPP: 1 (ref 0.8–1.2)
LYMPHOCYTES # BLD: 2.4 K/UL (ref 0.9–3.6)
LYMPHOCYTES NFR BLD: 31 % (ref 21–52)
MCH RBC QN AUTO: 30.8 PG (ref 24–34)
MCHC RBC AUTO-ENTMCNC: 32.9 G/DL (ref 31–37)
MCV RBC AUTO: 93.8 FL (ref 74–97)
MONOCYTES # BLD: 0.5 K/UL (ref 0.05–1.2)
MONOCYTES NFR BLD: 7 % (ref 3–10)
NEUTS SEG # BLD: 4.6 K/UL (ref 1.8–8)
NEUTS SEG NFR BLD: 61 % (ref 40–73)
PLATELET # BLD AUTO: 289 K/UL (ref 135–420)
PMV BLD AUTO: 10.4 FL (ref 9.2–11.8)
POTASSIUM SERPL-SCNC: 3.6 MMOL/L (ref 3.5–5.5)
PROT SERPL-MCNC: 7.6 G/DL (ref 6.4–8.2)
PROTHROMBIN TIME: 13 SEC (ref 11.5–15.2)
RBC # BLD AUTO: 4.83 M/UL (ref 4.2–5.3)
SODIUM SERPL-SCNC: 142 MMOL/L (ref 136–145)
TROPONIN I SERPL-MCNC: 0.71 NG/ML (ref 0–0.04)
TSH SERPL DL<=0.05 MIU/L-ACNC: 2.86 UIU/ML (ref 0.36–3.74)
WBC # BLD AUTO: 7.6 K/UL (ref 4.6–13.2)

## 2021-06-12 PROCEDURE — 94762 N-INVAS EAR/PLS OXIMTRY CONT: CPT

## 2021-06-12 PROCEDURE — 93005 ELECTROCARDIOGRAM TRACING: CPT

## 2021-06-12 PROCEDURE — 85379 FIBRIN DEGRADATION QUANT: CPT

## 2021-06-12 PROCEDURE — 84443 ASSAY THYROID STIM HORMONE: CPT

## 2021-06-12 PROCEDURE — 85610 PROTHROMBIN TIME: CPT

## 2021-06-12 PROCEDURE — 82553 CREATINE MB FRACTION: CPT

## 2021-06-12 PROCEDURE — 74011250637 HC RX REV CODE- 250/637: Performed by: EMERGENCY MEDICINE

## 2021-06-12 PROCEDURE — 74011250636 HC RX REV CODE- 250/636: Performed by: EMERGENCY MEDICINE

## 2021-06-12 PROCEDURE — 85025 COMPLETE CBC W/AUTO DIFF WBC: CPT

## 2021-06-12 PROCEDURE — 80061 LIPID PANEL: CPT

## 2021-06-12 PROCEDURE — 85730 THROMBOPLASTIN TIME PARTIAL: CPT

## 2021-06-12 PROCEDURE — 65270000029 HC RM PRIVATE

## 2021-06-12 PROCEDURE — 71045 X-RAY EXAM CHEST 1 VIEW: CPT

## 2021-06-12 PROCEDURE — 80053 COMPREHEN METABOLIC PANEL: CPT

## 2021-06-12 PROCEDURE — 99284 EMERGENCY DEPT VISIT MOD MDM: CPT

## 2021-06-12 RX ORDER — LORAZEPAM 0.5 MG/1
0.25 TABLET ORAL
Status: DISCONTINUED | OUTPATIENT
Start: 2021-06-12 | End: 2021-06-14

## 2021-06-12 RX ORDER — ROSUVASTATIN CALCIUM 10 MG/1
40 TABLET, COATED ORAL
Status: DISCONTINUED | OUTPATIENT
Start: 2021-06-12 | End: 2021-06-14 | Stop reason: HOSPADM

## 2021-06-12 RX ORDER — BUDESONIDE 0.5 MG/2ML
500 INHALANT ORAL
Status: DISCONTINUED | OUTPATIENT
Start: 2021-06-12 | End: 2021-06-14 | Stop reason: HOSPADM

## 2021-06-12 RX ORDER — ONDANSETRON 2 MG/ML
4 INJECTION INTRAMUSCULAR; INTRAVENOUS
Status: DISCONTINUED | OUTPATIENT
Start: 2021-06-12 | End: 2021-06-14 | Stop reason: HOSPADM

## 2021-06-12 RX ORDER — HEPARIN SODIUM 1000 [USP'U]/ML
60 INJECTION, SOLUTION INTRAVENOUS; SUBCUTANEOUS ONCE
Status: COMPLETED | OUTPATIENT
Start: 2021-06-12 | End: 2021-06-12

## 2021-06-12 RX ORDER — POLYETHYLENE GLYCOL 3350 17 G/17G
17 POWDER, FOR SOLUTION ORAL DAILY PRN
Status: DISCONTINUED | OUTPATIENT
Start: 2021-06-12 | End: 2021-06-14 | Stop reason: HOSPADM

## 2021-06-12 RX ORDER — ACETAMINOPHEN 325 MG/1
650 TABLET ORAL
Status: DISCONTINUED | OUTPATIENT
Start: 2021-06-12 | End: 2021-06-14 | Stop reason: HOSPADM

## 2021-06-12 RX ORDER — MONTELUKAST SODIUM 10 MG/1
10 TABLET ORAL DAILY
Status: DISCONTINUED | OUTPATIENT
Start: 2021-06-13 | End: 2021-06-14 | Stop reason: HOSPADM

## 2021-06-12 RX ORDER — MORPHINE SULFATE 2 MG/ML
2 INJECTION, SOLUTION INTRAMUSCULAR; INTRAVENOUS
Status: DISCONTINUED | OUTPATIENT
Start: 2021-06-12 | End: 2021-06-14 | Stop reason: HOSPADM

## 2021-06-12 RX ORDER — ARFORMOTEROL TARTRATE 15 UG/2ML
15 SOLUTION RESPIRATORY (INHALATION)
Status: DISCONTINUED | OUTPATIENT
Start: 2021-06-12 | End: 2021-06-14 | Stop reason: HOSPADM

## 2021-06-12 RX ORDER — ROSUVASTATIN CALCIUM 10 MG/1
40 TABLET, COATED ORAL DAILY
Status: DISCONTINUED | OUTPATIENT
Start: 2021-06-13 | End: 2021-06-12

## 2021-06-12 RX ORDER — FAMOTIDINE 20 MG/1
20 TABLET, FILM COATED ORAL 2 TIMES DAILY
Status: DISCONTINUED | OUTPATIENT
Start: 2021-06-12 | End: 2021-06-14 | Stop reason: HOSPADM

## 2021-06-12 RX ORDER — SODIUM CHLORIDE 0.9 % (FLUSH) 0.9 %
5-40 SYRINGE (ML) INJECTION AS NEEDED
Status: DISCONTINUED | OUTPATIENT
Start: 2021-06-12 | End: 2021-06-14 | Stop reason: HOSPADM

## 2021-06-12 RX ORDER — SODIUM CHLORIDE 0.9 % (FLUSH) 0.9 %
5-40 SYRINGE (ML) INJECTION EVERY 8 HOURS
Status: DISCONTINUED | OUTPATIENT
Start: 2021-06-12 | End: 2021-06-13

## 2021-06-12 RX ORDER — GUAIFENESIN 100 MG/5ML
81 LIQUID (ML) ORAL DAILY
Status: DISCONTINUED | OUTPATIENT
Start: 2021-06-13 | End: 2021-06-14 | Stop reason: HOSPADM

## 2021-06-12 RX ORDER — ATENOLOL 25 MG/1
12.5 TABLET ORAL DAILY
Status: DISCONTINUED | OUTPATIENT
Start: 2021-06-13 | End: 2021-06-12

## 2021-06-12 RX ORDER — ACETAMINOPHEN 650 MG/1
650 SUPPOSITORY RECTAL
Status: DISCONTINUED | OUTPATIENT
Start: 2021-06-12 | End: 2021-06-14 | Stop reason: HOSPADM

## 2021-06-12 RX ORDER — HEPARIN SODIUM 10000 [USP'U]/100ML
12-25 INJECTION, SOLUTION INTRAVENOUS
Status: DISCONTINUED | OUTPATIENT
Start: 2021-06-12 | End: 2021-06-13

## 2021-06-12 RX ORDER — ATENOLOL 25 MG/1
12.5 TABLET ORAL
Status: DISCONTINUED | OUTPATIENT
Start: 2021-06-12 | End: 2021-06-13

## 2021-06-12 RX ORDER — ONDANSETRON 4 MG/1
4 TABLET, ORALLY DISINTEGRATING ORAL
Status: DISCONTINUED | OUTPATIENT
Start: 2021-06-12 | End: 2021-06-14 | Stop reason: HOSPADM

## 2021-06-12 RX ADMIN — HEPARIN SODIUM 12 UNITS/KG/HR: 10000 INJECTION, SOLUTION INTRAVENOUS at 20:56

## 2021-06-12 RX ADMIN — NITROGLYCERIN 1 INCH: 20 OINTMENT TOPICAL at 20:10

## 2021-06-12 RX ADMIN — HEPARIN SODIUM 3400 UNITS: 1000 INJECTION INTRAVENOUS; SUBCUTANEOUS at 20:43

## 2021-06-12 NOTE — ED NOTES
Pt placed on bedside CM showing SR;  Dr. Pb Cooper at bedside to evaluate pt;  Blood, EKG, and INT established; call bell within reach; SR up x's 2; pt denies any CP at this time

## 2021-06-12 NOTE — ED PROVIDER NOTES
EMERGENCY DEPARTMENT HISTORY AND PHYSICAL EXAM    Date: 6/12/2021  Patient Name: Christine Cameron    History of Presenting Illness     No chief complaint on file. History Provided By: Patient    7:21 PM  Christine Cameron is a 64 y.o. female with PMHX of CAD status post cardiac stent in 2011 who presents to the emergency department C/O chest pain. Patient reports the symptoms started yesterday but she took a nitro last night and had some relief. Today when she was pulling weeds it reoccurred and she took another nitro. She reports it was a 7 out of 10 and for like a pressure in the center of her chest but after nitro it is nearly completely relieved. No other relieving or exacerbating factors identified. Reports compliance with all her medications. Does not smoke. Has been flu vaccine against COVID-19. Denies any associated shortness of breath, fever, cough, vomiting, bowel or urinary complaints, lower extremity edema, recent travel or sick contacts.      PCP: Cherry Tuttle MD    Current Facility-Administered Medications   Medication Dose Route Frequency Provider Last Rate Last Admin    heparin (porcine) 1,000 unit/mL injection 3,400 Units  60 Units/kg IntraVENous ONCE Theo Negron MD        heparin 25,000 units in D5W 250 ml infusion  12-25 Units/kg/hr IntraVENous TITRATE Theo Negron MD        [START ON 6/13/2021] atenoloL (TENORMIN) tablet 12.5 mg  12.5 mg Oral DAILY [place] MD        [START ON 6/13/2021] montelukast (SINGULAIR) tablet 10 mg  10 mg Oral DAILY [place] MD        [START ON 6/13/2021] rosuvastatin (CRESTOR) tablet 40 mg  40 mg Oral DAILY [place] MD        famotidine (PEPCID) tablet 20 mg  20 mg Oral BID [place] MD        morphine injection 2 mg  2 mg IntraVENous Q4H PRN [place] MD        sodium chloride (NS) flush 5-40 mL  5-40 mL IntraVENous Q8H [place] MD        sodium chloride (NS) flush 5-40 mL  5-40 mL IntraVENous PRN Yoan Sunshine MD        acetaminophen (TYLENOL) tablet 650 mg  650 mg Oral Q6H PRN Yoan Sunshine MD        Or    acetaminophen (TYLENOL) suppository 650 mg  650 mg Rectal Q6H PRN Yoan Sunshine MD        polyethylene glycol (MIRALAX) packet 17 g  17 g Oral DAILY PRN Yoan Sunshine MD        ondansetron (ZOFRAN ODT) tablet 4 mg  4 mg Oral Q8H PRN Yoan Sunshine MD        Or    [facility]dansetron [facility]) injection 4 mg  4 mg IntraVENous Q6H PRN Yoan Sunshine MD         Current Outpatient Medications   Medication Sig Dispense Refill    isosorbide dinitrate (ISORDIL) 5 mg tablet Take 5 mg by mouth two (2) times a day.  fluticasone propion-salmeterol (ADVAIR DISKUS) 100-50 mcg/dose diskus inhaler Take 1 Puff by inhalation two (2) times a day.  famotidine (PEPCID) 20 mg tablet Take 20 mg by mouth daily.  clindamycin (CLEOCIN) 75 mg capsule Take 150 mg by mouth four (4) times daily.  fluticasone (FLONASE ALLERGY RELIEF) 50 mcg/actuation nasal spray 2 Sprays by Both Nostrils route daily.  cyanocobalamin 1,000 mcg tablet Take 1,000 mcg by mouth daily.  furosemide (LASIX) 20 mg tablet take 1 tablet by mouth once daily 90 tablet 3    atenolol (TENORMIN) 25 mg tablet TAKE 1/2 TABLET BY MOUTH EVERY DAY 90 tablet 3    nitroglycerin (NITROSTAT) 0.4 mg SL tablet 1 tablet by SubLINGual route every five (5) minutes as needed for Chest Pain (total of 3 tabs for one episode of pain). 75 tablet 3    rosuvastatin (CRESTOR) 10 mg tablet Take 1 tablet by mouth daily. 90 tablet 3    Cholecalciferol, Vitamin D3, 50,000 unit cap Take  by mouth every Monday. Takes every 3 weeks      montelukast (SINGULAIR) 10 mg tablet Take 10 mg by mouth daily.  desloratadine (CLARINEX) 5 mg disintegrating tablet Take 5 mg by mouth daily.  LORazepam (ATIVAN) 0.5 mg tablet Take 0.25 mg by mouth nightly.  Pt states she is weaning herself off      potassium chloride SR (KLOR-CON 10) 10 mEq tablet Take 10 mEq by mouth daily.  aspirin 81 mg tablet Take 81 mg by mouth daily. Past History     Past Medical History:  Past Medical History:   Diagnosis Date    Asthma     CAD (coronary artery disease)     Chest pain, unspecified 9/9/2012    Coronary atherosclerosis of native coronary artery     Depression     Epistaxis 12/11/2012    Fatigue 12/11/2012    GERD (gastroesophageal reflux disease)     Ill-defined condition     allergies    Ill-defined condition     anal mole and cyst    Left arm pain 8/7/2014    Nausea & vomiting     Other and unspecified hyperlipidemia 6/1/2011    Other and unspecified hyperlipidemia     Ovarian cyst        Past Surgical History:  Past Surgical History:   Procedure Laterality Date    HX APPENDECTOMY      HX BREAST BIOPSY      right cyst    HX CHOLECYSTECTOMY      HX CORONARY STENT PLACEMENT  05/05/11    stent mid right coronary artery stenosis    HX GYN      dysplasia    HX HEART CATHETERIZATION  05/11    stent placement mid coronary artery    HX HYSTERECTOMY      HX KNEE ARTHROSCOPY      left    HX OOPHORECTOMY      HX TONSILLECTOMY         Family History:  Family History   Problem Relation Age of Onset    Coronary Artery Disease Mother 46    Heart Attack Mother 46       Social History:  Social History     Tobacco Use    Smoking status: Current Some Day Smoker     Packs/day: 0.80     Years: 30.00     Pack years: 24.00     Types: Cigarettes     Last attempt to quit: 5/9/2011     Years since quitting: 10.1    Smokeless tobacco: Never Used   Substance Use Topics    Alcohol use: No     Comment: ross yearly    Drug use: No       Allergies:   Allergies   Allergen Reactions    Zocor [Simvastatin] Hives    Biaxin [Clarithromycin] Other (comments)     Whelps      Ciprofloxacin Rash    Pcn [Penicillins] Rash    Plavix [Clopidogrel] Rash    Wellbutrin [Bupropion Hcl] Other (comments)     Misalps             Review of Systems   Review of Systems   Constitutional: Negative for fever. Respiratory: Negative for shortness of breath. Cardiovascular: Positive for chest pain. Negative for leg swelling. Gastrointestinal: Negative for abdominal pain. All other systems reviewed and are negative.         Physical Exam     Vitals:    06/12/21 1908 06/12/21 2009 06/12/21 2010 06/12/21 2015   BP: (!) 162/89 139/71 139/71 133/70   Pulse: 80 65 65 (!) 57   Resp: 18 18  17   Temp: 98.6 °F (37 °C)      SpO2: 100% 100%  98%   Weight: 56.7 kg (125 lb)      Height: 5' 7\" (1.702 m)        Physical Exam    Nursing notes and vital signs reviewed    Constitutional: Non toxic appearing, moderate distress  Head: Normocephalic, Atraumatic  Eyes: EOMI  Neck: Supple  Cardiovascular: Regular rate and rhythm, no murmurs, rubs, or gallops  Chest: Normal work of breathing and chest excursion bilaterally  Lungs: Clear to ausculation bilaterally  Abdomen: Soft, non tender, non distended  Back: No evidence of trauma or deformity  Extremities: No evidence of trauma or deformity, no LE edema  Skin: Warm and dry, normal cap refill  Neuro: Alert and appropriate  Psychiatric: Normal mood and affect      Diagnostic Study Results     Labs -     Recent Results (from the past 12 hour(s))   EKG, 12 LEAD, INITIAL    Collection Time: 06/12/21  7:15 PM   Result Value Ref Range    Ventricular Rate 78 BPM    Atrial Rate 78 BPM    P-R Interval 146 ms    QRS Duration 88 ms    Q-T Interval 360 ms    QTC Calculation (Bezet) 410 ms    Calculated P Axis 73 degrees    Calculated R Axis 71 degrees    Calculated T Axis 69 degrees    Diagnosis       Normal sinus rhythm  Possible Left atrial enlargement  ST depression, consider subendocardial injury or digitalis effect  Abnormal ECG  When compared with ECG of 31-MAR-2019 16:15,  ST now depressed in Inferior leads     CBC WITH AUTOMATED DIFF    Collection Time: 06/12/21  7:20 PM Result Value Ref Range    WBC 7.6 4.6 - 13.2 K/uL    RBC 4.83 4.20 - 5.30 M/uL    HGB 14.9 12.0 - 16.0 g/dL    HCT 45.3 (H) 35.0 - 45.0 %    MCV 93.8 74.0 - 97.0 FL    MCH 30.8 24.0 - 34.0 PG    MCHC 32.9 31.0 - 37.0 g/dL    RDW 13.2 11.6 - 14.5 %    PLATELET 737 578 - 037 K/uL    MPV 10.4 9.2 - 11.8 FL    NEUTROPHILS 61 40 - 73 %    LYMPHOCYTES 31 21 - 52 %    MONOCYTES 7 3 - 10 %    EOSINOPHILS 1 0 - 5 %    BASOPHILS 1 0 - 2 %    ABS. NEUTROPHILS 4.6 1.8 - 8.0 K/UL    ABS. LYMPHOCYTES 2.4 0.9 - 3.6 K/UL    ABS. MONOCYTES 0.5 0.05 - 1.2 K/UL    ABS. EOSINOPHILS 0.1 0.0 - 0.4 K/UL    ABS. BASOPHILS 0.0 0.0 - 0.1 K/UL    DF AUTOMATED     D DIMER    Collection Time: 06/12/21  7:20 PM   Result Value Ref Range    D DIMER 0.36 <0.46 ug/ml(FEU)   METABOLIC PANEL, COMPREHENSIVE    Collection Time: 06/12/21  7:20 PM   Result Value Ref Range    Sodium 142 136 - 145 mmol/L    Potassium 3.6 3.5 - 5.5 mmol/L    Chloride 106 100 - 111 mmol/L    CO2 31 21 - 32 mmol/L    Anion gap 5 3.0 - 18 mmol/L    Glucose 109 (H) 74 - 99 mg/dL    BUN 17 7.0 - 18 MG/DL    Creatinine 0.93 0.6 - 1.3 MG/DL    BUN/Creatinine ratio 18 12 - 20      GFR est AA >60 >60 ml/min/1.73m2    GFR est non-AA >60 >60 ml/min/1.73m2    Calcium 9.9 8.5 - 10.1 MG/DL    Bilirubin, total 0.3 0.2 - 1.0 MG/DL    ALT (SGPT) 24 13 - 56 U/L    AST (SGOT) 29 10 - 38 U/L    Alk.  phosphatase 113 45 - 117 U/L    Protein, total 7.6 6.4 - 8.2 g/dL    Albumin 4.2 3.4 - 5.0 g/dL    Globulin 3.4 2.0 - 4.0 g/dL    A-G Ratio 1.2 0.8 - 1.7     CARDIAC PANEL,(CK, CKMB & TROPONIN)    Collection Time: 06/12/21  7:20 PM   Result Value Ref Range    CK - MB 6.5 (H) <3.6 ng/ml    CK-MB Index 3.9 0.0 - 4.0 %     26 - 192 U/L    Troponin-I, QT 0.71 (H) 0.0 - 0.045 NG/ML   PROTHROMBIN TIME + INR    Collection Time: 06/12/21  7:20 PM   Result Value Ref Range    Prothrombin time 13.0 11.5 - 15.2 sec    INR 1.0 0.8 - 1.2     PTT    Collection Time: 06/12/21  7:20 PM   Result Value Ref Range    aPTT 28.9 23.0 - 36.4 SEC       Radiologic Studies -   XR CHEST PORT    (Results Pending)   X-RAY FINDINGS:  7:59 PM  Chest x-ray shows NAP  Read by Dr. Jessica Deleon, Pending review by Radiologist  CT Results  (Last 48 hours)    None        CXR Results  (Last 48 hours)    None          Medications given in the ED-  Medications   heparin (porcine) 1,000 unit/mL injection 3,400 Units (has no administration in time range)   heparin 25,000 units in D5W 250 ml infusion (has no administration in time range)   atenoloL (TENORMIN) tablet 12.5 mg (has no administration in time range)   montelukast (SINGULAIR) tablet 10 mg (has no administration in time range)   rosuvastatin (CRESTOR) tablet 40 mg (has no administration in time range)   famotidine (PEPCID) tablet 20 mg (has no administration in time range)   morphine injection 2 mg (has no administration in time range)   sodium chloride (NS) flush 5-40 mL (has no administration in time range)   sodium chloride (NS) flush 5-40 mL (has no administration in time range)   acetaminophen (TYLENOL) tablet 650 mg (has no administration in time range)     Or   acetaminophen (TYLENOL) suppository 650 mg (has no administration in time range)   polyethylene glycol (MIRALAX) packet 17 g (has no administration in time range)   ondansetron (ZOFRAN ODT) tablet 4 mg (has no administration in time range)     Or   ondansetron (ZOFRAN) injection 4 mg (has no administration in time range)   nitroglycerin (NITROBID) 2 % ointment 1 Inch (1 Inch Topical Given 6/12/21 2010)         Medical Decision Making   I am the first provider for this patient. I reviewed the vital signs, available nursing notes, past medical history, past surgical history, family history and social history. Vital Signs-Reviewed the patient's vital signs.     Pulse Oximetry Analysis - 100% on room air, not hypoxic     Cardiac Monitor:  Rate: 66 bpm  Rhythm: Normal sinus    EKG interpretation: (Preliminary)  EKG read by Dr. Van Lopez at 7:18 PM  Normal sinus rhythm at a rate of 78 bpm, NV interval of 146 ms, QRS duration 88 ms, similar when compared to prior from March 2019    Records Reviewed: Nursing Notes, Old Medical Records and Previous electrocardiograms    Provider Notes (Medical Decision Making): Moira Mendoza is a 64 y.o. female presenting for chest pressure with significant cardiac history. EKG similar to prior but troponin is slightly elevated. Patient continues to have chest pressure here in the emergency department. Nitropaste applied and heparin drip started. Discussed with both cardiology and hospitalist for further inpatient management. Patient understands and agrees with this plan. Procedures:  Procedures    ED Course:   7:59 PM  Updated patient on all results and plan. All questions answered. She is still experiencing some pressure in her chest but declines both sublingual nitro and IV morphine as she has had unpleasant side effects from both in the past.  She is willing to try some Nitropaste. CONSULT NOTE:   8:06 PM  Dr. Van Lopez spoke with Dr. Patito Boyce   Specialty: Cardiology  Discussed pt's hx, disposition, and available diagnostic and imaging results over the telephone. Reviewed care plans. Nitro-paste, heparin gtt, will consult, admit to hospital.     CONSULT NOTE:   8:16 PM  Dr. Van Lopez spoke with Dr. Archie Whittaker   Specialty: Hospitalist  Discussed pt's hx, disposition, and available diagnostic and imaging results over the telephone. Reviewed care plans. Steps to telemetry. Diagnosis and Disposition     Critical Care Time: 8:23 PM  I have spent 35 minutes of critical care time involved in lab review, consultations with specialist, family decision-making, and documentation. During this entire length of time I was immediately available to the patient. Critical Care:   The reason for providing this level of medical care for this critically ill patient was due a critical illness that impaired one or more vital organ systems such that there was a high probability of imminent or life threatening deterioration in the patients condition. This care involved high complexity decision making to assess, manipulate, and support vital system functions, to treat this degreee vital organ system failure and to prevent further life threatening deterioration of the patients condition. Core Measures:  For Hospitalized Patients:    1. Hospitalization Decision Time:  The decision to hospitalize the patient was made by Dr. Jeni Rothman at 8:01 PM on 6/12/2021    2. Aspirin: Aspirin was taken prior to arrival    8:00 PM  Patient is being admitted to the hospital by Dr. Oumar Riojas. The results of their tests and reasons for their admission have been discussed with them and/or available family. They convey agreement and understanding for the need to be admitted and for their admission diagnosis. CONDITIONS ON ADMISSION:  Sepsis is not present at the time of admission. Deep Vein Thrombosis is not present at the time of admission. Thrombosis is not present at the time of admission. Urinary Tract Infection is not present at the time of admission. Pneumonia is not present at the time of admission. MRSA is not present at the time of admission. Wound infection is not present at the time of admission. Pressure Ulcer is not present at the time of admission. CLINICAL IMPRESSION:    1. ACS (acute coronary syndrome) ([facility]      _______________________________      Please note that this dictation was completed with [employer], the computer voice recognition software. Quite often unanticipated grammatical, syntax, homophones, and other interpretive errors are inadvertently transcribed by the computer software. Please disregard these errors. Please excuse any errors that have escaped final proofreading.

## 2021-06-12 NOTE — ED TRIAGE NOTES
Patient arrives ambulatory with c/c of chest pain, onset yesterday afternoon. Patient reports she has taken a nitro sl and the chest pain went away. Patient has a cardiac history of 2 stents placed in 2011.

## 2021-06-12 NOTE — Clinical Note
TRANSFER - IN REPORT:     Verbal report received from: Tele RN. Report consisted of patient's Situation, Background, Assessment and   Recommendations(SBAR). Opportunity for questions and clarification was provided. Assessment completed upon patient's arrival to unit and care assumed. Patient transported with a Registered Nurse.

## 2021-06-12 NOTE — Clinical Note
Lesion located in the Mid Cx. Balloon inserted. Balloon inflated using multiple inflation technique. Lesion #1: Pressure = 12 regina; Duration = 35 sec. Inflation 2: Pressure = 12 regina; Duration = 8 sec. Inflation 3: Pressure = 14 regina; Duration = 12 sec. Inflation 4: Pressure = 12 regina; Duration = 7 sec.

## 2021-06-12 NOTE — Clinical Note
TRANSFER - OUT REPORT:     Verbal report given to: TO CARE UNIT WITH CATH TEAM.     Report consisted of patient's Situation, Background, Assessment and   Recommendations(SBAR). Opportunity for questions and clarification was provided. Patient transported with a Registered Nurse.

## 2021-06-12 NOTE — Clinical Note
Lesion located in the Mid Cx. Balloon inserted. Balloon inflated using multiple inflation technique. Lesion #1: Pressure = 8 regina; Duration = 6 sec. Inflation 2: Pressure = 10 regina; Duration = 8 sec.

## 2021-06-12 NOTE — Clinical Note
Lesion: Located in the Mid Cx. Stent inserted. Multiple inflations used. First inflation pressure = 12 regina; inflation time: 8 sec.

## 2021-06-13 ENCOUNTER — APPOINTMENT (OUTPATIENT)
Dept: NON INVASIVE DIAGNOSTICS | Age: 61
DRG: 247 | End: 2021-06-13
Attending: INTERNAL MEDICINE
Payer: COMMERCIAL

## 2021-06-13 PROBLEM — Z72.0 TOBACCO USE: Status: ACTIVE | Noted: 2021-06-13

## 2021-06-13 PROBLEM — I48.0 PAROXYSMAL ATRIAL FIBRILLATION (HCC): Status: ACTIVE | Noted: 2021-06-13

## 2021-06-13 LAB
ACT BLD: 373 SECS (ref 79–138)
ALBUMIN SERPL-MCNC: 3.5 G/DL (ref 3.4–5)
ALBUMIN/GLOB SERPL: 1.4 {RATIO} (ref 0.8–1.7)
ALP SERPL-CCNC: 90 U/L (ref 45–117)
ALT SERPL-CCNC: 33 U/L (ref 13–56)
ANION GAP SERPL CALC-SCNC: 6 MMOL/L (ref 3–18)
APTT PPP: 155.6 SEC (ref 23–36.4)
APTT PPP: 57.8 SEC (ref 23–36.4)
AST SERPL-CCNC: 79 U/L (ref 10–38)
BASOPHILS # BLD: 0.1 K/UL (ref 0–0.1)
BASOPHILS NFR BLD: 1 % (ref 0–2)
BILIRUB SERPL-MCNC: 0.5 MG/DL (ref 0.2–1)
BUN SERPL-MCNC: 13 MG/DL (ref 7–18)
BUN/CREAT SERPL: 22 (ref 12–20)
CALCIUM SERPL-MCNC: 8.6 MG/DL (ref 8.5–10.1)
CHLORIDE SERPL-SCNC: 108 MMOL/L (ref 100–111)
CHOLEST SERPL-MCNC: 204 MG/DL
CK MB CFR SERPL CALC: 11.1 % (ref 0–4)
CK MB CFR SERPL CALC: 11.5 % (ref 0–4)
CK MB CFR SERPL CALC: 7.1 % (ref 0–4)
CK MB CFR SERPL CALC: 9.8 % (ref 0–4)
CK MB SERPL-MCNC: 38.3 NG/ML (ref 5–25)
CK MB SERPL-MCNC: 40.7 NG/ML (ref 5–25)
CK MB SERPL-MCNC: 59.9 NG/ML (ref 5–25)
CK MB SERPL-MCNC: 63.9 NG/ML (ref 5–25)
CK SERPL-CCNC: 367 U/L (ref 26–192)
CK SERPL-CCNC: 538 U/L (ref 26–192)
CK SERPL-CCNC: 557 U/L (ref 26–192)
CK SERPL-CCNC: 609 U/L (ref 26–192)
CO2 SERPL-SCNC: 28 MMOL/L (ref 21–32)
CREAT SERPL-MCNC: 0.59 MG/DL (ref 0.6–1.3)
DIFFERENTIAL METHOD BLD: ABNORMAL
ECHO AO ROOT DIAM: 2.69 CM
ECHO AV AREA PEAK VELOCITY: 3.02 CM2
ECHO AV AREA VTI: 3.53 CM2
ECHO AV AREA/BSA PEAK VELOCITY: 1.8 CM2/M2
ECHO AV AREA/BSA VTI: 2.1 CM2/M2
ECHO AV MEAN GRADIENT: 3.31 MMHG
ECHO AV PEAK GRADIENT: 7.57 MMHG
ECHO AV PEAK VELOCITY: 137.53 CM/S
ECHO AV VTI: 29.12 CM
ECHO EST RA PRESSURE: 3 MMHG
ECHO IVC PROX: 2 CM
ECHO LA MAJOR AXIS: 3.27 CM
ECHO LA MINOR AXIS: 1.97 CM
ECHO LA VOL 2C: 19.37 ML (ref 22–52)
ECHO LA VOL 4C: 21.23 ML (ref 22–52)
ECHO LA VOL BP: 23.12 ML (ref 22–52)
ECHO LA VOL/BSA BIPLANE: 13.93 ML/M2 (ref 16–28)
ECHO LA VOLUME INDEX A2C: 11.67 ML/M2 (ref 16–28)
ECHO LA VOLUME INDEX A4C: 12.79 ML/M2 (ref 16–28)
ECHO LV E' LATERAL VELOCITY: 12 CM/S
ECHO LV E' SEPTAL VELOCITY: 10 CM/S
ECHO LV EDV A2C: 65.87 ML
ECHO LV EDV A4C: 57.15 ML
ECHO LV EDV BP: 64.26 ML (ref 56–104)
ECHO LV EDV INDEX A4C: 34.4 ML/M2
ECHO LV EDV INDEX BP: 38.7 ML/M2
ECHO LV EDV NDEX A2C: 39.7 ML/M2
ECHO LV EJECTION FRACTION A2C: 70 PERCENT
ECHO LV EJECTION FRACTION A4C: 67 PERCENT
ECHO LV EJECTION FRACTION BIPLANE: 69.4 PERCENT (ref 55–100)
ECHO LV ESV A2C: 19.69 ML
ECHO LV ESV A4C: 18.96 ML
ECHO LV ESV BP: 19.69 ML (ref 19–49)
ECHO LV ESV INDEX A2C: 11.9 ML/M2
ECHO LV ESV INDEX A4C: 11.4 ML/M2
ECHO LV ESV INDEX BP: 11.9 ML/M2
ECHO LV INTERNAL DIMENSION DIASTOLIC: 4.85 CM (ref 3.9–5.3)
ECHO LV INTERNAL DIMENSION SYSTOLIC: 3.62 CM
ECHO LV IVSD: 0.56 CM (ref 0.6–0.9)
ECHO LV MASS 2D: 75.7 G (ref 67–162)
ECHO LV MASS INDEX 2D: 45.6 G/M2 (ref 43–95)
ECHO LV POSTERIOR WALL DIASTOLIC: 0.48 CM (ref 0.6–0.9)
ECHO LVOT DIAM: 2.04 CM
ECHO LVOT PEAK GRADIENT: 6.41 MMHG
ECHO LVOT PEAK VELOCITY: 126.56 CM/S
ECHO LVOT SV: 102.8 ML
ECHO LVOT VTI: 31.35 CM
ECHO MV A VELOCITY: 66.4 CM/S
ECHO MV AREA PHT: 4.31 CM2
ECHO MV E DECELERATION TIME (DT): 176.18 MS
ECHO MV E VELOCITY: 71.25 CM/S
ECHO MV E/A RATIO: 1.07
ECHO MV E/E' LATERAL: 5.94
ECHO MV E/E' RATIO (AVERAGED): 6.53
ECHO MV E/E' SEPTAL: 7.13
ECHO MV PRESSURE HALF TIME (PHT): 51.09 MS
ECHO MV REGURGITANT VTIA: 221.69 CM
ECHO PV REGURGITANT MAX VELOCITY: 532.35 CM/S
ECHO RA AREA 4C: 11.25 CM2
ECHO RV INTERNAL DIMENSION: 2.61 CM
ECHO RV TAPSE: 2.3 CM (ref 1.5–2)
ECHO TV MEAN GRADIENT: 82.67 MMHG
ECHO TV REGURGITANT MAX VELOCITY: 268.5 CM/S
ECHO TV REGURGITANT PEAK GRADIENT: 28.84 MMHG
EOSINOPHIL # BLD: 0.1 K/UL (ref 0–0.4)
EOSINOPHIL NFR BLD: 1 % (ref 0–5)
ERYTHROCYTE [DISTWIDTH] IN BLOOD BY AUTOMATED COUNT: 13.2 % (ref 11.6–14.5)
GLOBULIN SER CALC-MCNC: 2.5 G/DL (ref 2–4)
GLUCOSE SERPL-MCNC: 106 MG/DL (ref 74–99)
HCT VFR BLD AUTO: 39 % (ref 35–45)
HCT VFR BLD AUTO: 40.7 % (ref 35–45)
HDLC SERPL-MCNC: 71 MG/DL (ref 40–60)
HDLC SERPL: 2.9 {RATIO} (ref 0–5)
HGB BLD-MCNC: 13 G/DL (ref 12–16)
HGB BLD-MCNC: 13.4 G/DL (ref 12–16)
LDLC SERPL CALC-MCNC: 100.2 MG/DL (ref 0–100)
LIPID PROFILE,FLP: ABNORMAL
LVOT MG: 4.23 MMHG
LYMPHOCYTES # BLD: 1.5 K/UL (ref 0.9–3.6)
LYMPHOCYTES NFR BLD: 15 % (ref 21–52)
MCH RBC QN AUTO: 30.6 PG (ref 24–34)
MCHC RBC AUTO-ENTMCNC: 32.9 G/DL (ref 31–37)
MCV RBC AUTO: 92.9 FL (ref 74–97)
MONOCYTES # BLD: 0.6 K/UL (ref 0.05–1.2)
MONOCYTES NFR BLD: 6 % (ref 3–10)
NEUTS SEG # BLD: 7.3 K/UL (ref 1.8–8)
NEUTS SEG NFR BLD: 77 % (ref 40–73)
PLATELET # BLD AUTO: 251 K/UL (ref 135–420)
PMV BLD AUTO: 10.8 FL (ref 9.2–11.8)
POTASSIUM SERPL-SCNC: 3.8 MMOL/L (ref 3.5–5.5)
PROT SERPL-MCNC: 6 G/DL (ref 6.4–8.2)
RBC # BLD AUTO: 4.38 M/UL (ref 4.2–5.3)
SODIUM SERPL-SCNC: 142 MMOL/L (ref 136–145)
TRIGL SERPL-MCNC: 164 MG/DL (ref ?–150)
TROPONIN I SERPL-MCNC: 14.7 NG/ML (ref 0–0.04)
TROPONIN I SERPL-MCNC: 3.34 NG/ML (ref 0–0.04)
TROPONIN I SERPL-MCNC: 6.9 NG/ML (ref 0–0.04)
TROPONIN I SERPL-MCNC: 8.08 NG/ML (ref 0–0.04)
VLDLC SERPL CALC-MCNC: 32.8 MG/DL
WBC # BLD AUTO: 9.5 K/UL (ref 4.6–13.2)

## 2021-06-13 PROCEDURE — 85025 COMPLETE CBC W/AUTO DIFF WBC: CPT

## 2021-06-13 PROCEDURE — 94640 AIRWAY INHALATION TREATMENT: CPT

## 2021-06-13 PROCEDURE — 027034Z DILATION OF CORONARY ARTERY, ONE ARTERY WITH DRUG-ELUTING INTRALUMINAL DEVICE, PERCUTANEOUS APPROACH: ICD-10-PCS | Performed by: INTERNAL MEDICINE

## 2021-06-13 PROCEDURE — 74011250636 HC RX REV CODE- 250/636: Performed by: INTERNAL MEDICINE

## 2021-06-13 PROCEDURE — C1894 INTRO/SHEATH, NON-LASER: HCPCS | Performed by: INTERNAL MEDICINE

## 2021-06-13 PROCEDURE — 82553 CREATINE MB FRACTION: CPT

## 2021-06-13 PROCEDURE — 85347 COAGULATION TIME ACTIVATED: CPT

## 2021-06-13 PROCEDURE — 4A023N7 MEASUREMENT OF CARDIAC SAMPLING AND PRESSURE, LEFT HEART, PERCUTANEOUS APPROACH: ICD-10-PCS | Performed by: INTERNAL MEDICINE

## 2021-06-13 PROCEDURE — 74011000250 HC RX REV CODE- 250: Performed by: FAMILY MEDICINE

## 2021-06-13 PROCEDURE — 85730 THROMBOPLASTIN TIME PARTIAL: CPT

## 2021-06-13 PROCEDURE — 36415 COLL VENOUS BLD VENIPUNCTURE: CPT

## 2021-06-13 PROCEDURE — 74011250636 HC RX REV CODE- 250/636

## 2021-06-13 PROCEDURE — 93458 L HRT ARTERY/VENTRICLE ANGIO: CPT | Performed by: INTERNAL MEDICINE

## 2021-06-13 PROCEDURE — B2111ZZ FLUOROSCOPY OF MULTIPLE CORONARY ARTERIES USING LOW OSMOLAR CONTRAST: ICD-10-PCS | Performed by: INTERNAL MEDICINE

## 2021-06-13 PROCEDURE — C1874 STENT, COATED/COV W/DEL SYS: HCPCS | Performed by: INTERNAL MEDICINE

## 2021-06-13 PROCEDURE — 94760 N-INVAS EAR/PLS OXIMETRY 1: CPT

## 2021-06-13 PROCEDURE — C1725 CATH, TRANSLUMIN NON-LASER: HCPCS | Performed by: INTERNAL MEDICINE

## 2021-06-13 PROCEDURE — 77030008543 HC TBNG MON PRSS MRTM -A: Performed by: INTERNAL MEDICINE

## 2021-06-13 PROCEDURE — 77030029997 HC DEV COM RDL R BND TELE -B: Performed by: INTERNAL MEDICINE

## 2021-06-13 PROCEDURE — C1769 GUIDE WIRE: HCPCS | Performed by: INTERNAL MEDICINE

## 2021-06-13 PROCEDURE — 65660000000 HC RM CCU STEPDOWN

## 2021-06-13 PROCEDURE — 74011250636 HC RX REV CODE- 250/636: Performed by: EMERGENCY MEDICINE

## 2021-06-13 PROCEDURE — 77030013797 HC KT TRNSDUC PRSSR EDWD -A: Performed by: INTERNAL MEDICINE

## 2021-06-13 PROCEDURE — 93306 TTE W/DOPPLER COMPLETE: CPT

## 2021-06-13 PROCEDURE — 74011000250 HC RX REV CODE- 250: Performed by: INTERNAL MEDICINE

## 2021-06-13 PROCEDURE — 77030004522 HC CATH ANGI DX EXPO BSC -A: Performed by: INTERNAL MEDICINE

## 2021-06-13 PROCEDURE — 77030004521 HC CATH ANGI DX COOK -B: Performed by: INTERNAL MEDICINE

## 2021-06-13 PROCEDURE — 74011250637 HC RX REV CODE- 250/637: Performed by: INTERNAL MEDICINE

## 2021-06-13 PROCEDURE — C1887 CATHETER, GUIDING: HCPCS | Performed by: INTERNAL MEDICINE

## 2021-06-13 PROCEDURE — 99152 MOD SED SAME PHYS/QHP 5/>YRS: CPT | Performed by: INTERNAL MEDICINE

## 2021-06-13 PROCEDURE — 80053 COMPREHEN METABOLIC PANEL: CPT

## 2021-06-13 PROCEDURE — 99153 MOD SED SAME PHYS/QHP EA: CPT | Performed by: INTERNAL MEDICINE

## 2021-06-13 PROCEDURE — 85018 HEMOGLOBIN: CPT

## 2021-06-13 PROCEDURE — 74011000258 HC RX REV CODE- 258: Performed by: INTERNAL MEDICINE

## 2021-06-13 PROCEDURE — 74011250637 HC RX REV CODE- 250/637: Performed by: FAMILY MEDICINE

## 2021-06-13 PROCEDURE — 74011000636 HC RX REV CODE- 636: Performed by: INTERNAL MEDICINE

## 2021-06-13 PROCEDURE — C1876 STENT, NON-COA/NON-COV W/DEL: HCPCS | Performed by: INTERNAL MEDICINE

## 2021-06-13 PROCEDURE — 92928 PRQ TCAT PLMT NTRAC ST 1 LES: CPT | Performed by: INTERNAL MEDICINE

## 2021-06-13 PROCEDURE — 74011250636 HC RX REV CODE- 250/636: Performed by: FAMILY MEDICINE

## 2021-06-13 DEVICE — STENT RONYX22530UX RESOLUTE ONYX 2.25X30
Type: IMPLANTABLE DEVICE | Status: FUNCTIONAL
Brand: RESOLUTE ONYX™

## 2021-06-13 RX ORDER — HEPARIN SODIUM 200 [USP'U]/100ML
INJECTION, SOLUTION INTRAVENOUS
Status: COMPLETED | OUTPATIENT
Start: 2021-06-13 | End: 2021-06-13

## 2021-06-13 RX ORDER — HEPARIN SODIUM 1000 [USP'U]/ML
INJECTION, SOLUTION INTRAVENOUS; SUBCUTANEOUS AS NEEDED
Status: DISCONTINUED | OUTPATIENT
Start: 2021-06-13 | End: 2021-06-13 | Stop reason: HOSPADM

## 2021-06-13 RX ORDER — VERAPAMIL HYDROCHLORIDE 2.5 MG/ML
INJECTION, SOLUTION INTRAVENOUS AS NEEDED
Status: DISCONTINUED | OUTPATIENT
Start: 2021-06-13 | End: 2021-06-13 | Stop reason: HOSPADM

## 2021-06-13 RX ORDER — MIDAZOLAM HYDROCHLORIDE 1 MG/ML
INJECTION, SOLUTION INTRAMUSCULAR; INTRAVENOUS AS NEEDED
Status: DISCONTINUED | OUTPATIENT
Start: 2021-06-13 | End: 2021-06-13 | Stop reason: HOSPADM

## 2021-06-13 RX ORDER — POTASSIUM CHLORIDE 750 MG/1
10 TABLET, EXTENDED RELEASE ORAL DAILY
Status: DISCONTINUED | OUTPATIENT
Start: 2021-06-13 | End: 2021-06-14 | Stop reason: HOSPADM

## 2021-06-13 RX ORDER — METOPROLOL SUCCINATE 25 MG/1
25 TABLET, EXTENDED RELEASE ORAL DAILY
Status: DISCONTINUED | OUTPATIENT
Start: 2021-06-14 | End: 2021-06-14

## 2021-06-13 RX ORDER — FUROSEMIDE 20 MG/1
20 TABLET ORAL DAILY
Status: DISCONTINUED | OUTPATIENT
Start: 2021-06-13 | End: 2021-06-14 | Stop reason: HOSPADM

## 2021-06-13 RX ORDER — LIDOCAINE HYDROCHLORIDE 10 MG/ML
INJECTION INFILTRATION; PERINEURAL AS NEEDED
Status: DISCONTINUED | OUTPATIENT
Start: 2021-06-13 | End: 2021-06-13 | Stop reason: HOSPADM

## 2021-06-13 RX ORDER — FENTANYL CITRATE 50 UG/ML
INJECTION, SOLUTION INTRAMUSCULAR; INTRAVENOUS AS NEEDED
Status: DISCONTINUED | OUTPATIENT
Start: 2021-06-13 | End: 2021-06-13 | Stop reason: HOSPADM

## 2021-06-13 RX ORDER — HEPARIN SODIUM 1000 [USP'U]/ML
3000 INJECTION, SOLUTION INTRAVENOUS; SUBCUTANEOUS ONCE
Status: COMPLETED | OUTPATIENT
Start: 2021-06-13 | End: 2021-06-13

## 2021-06-13 RX ORDER — PHENYLEPHRINE HYDROCHLORIDE 10 MG/ML
INJECTION INTRAVENOUS AS NEEDED
Status: DISCONTINUED | OUTPATIENT
Start: 2021-06-13 | End: 2021-06-13 | Stop reason: HOSPADM

## 2021-06-13 RX ORDER — SODIUM CHLORIDE 9 MG/ML
50 INJECTION, SOLUTION INTRAVENOUS CONTINUOUS
Status: DISCONTINUED | OUTPATIENT
Start: 2021-06-13 | End: 2021-06-14 | Stop reason: HOSPADM

## 2021-06-13 RX ADMIN — ASPIRIN 81 MG: 81 TABLET, CHEWABLE ORAL at 08:42

## 2021-06-13 RX ADMIN — SODIUM CHLORIDE 50 ML/HR: 900 INJECTION, SOLUTION INTRAVENOUS at 21:18

## 2021-06-13 RX ADMIN — POTASSIUM CHLORIDE 10 MEQ: 750 TABLET, EXTENDED RELEASE ORAL at 08:42

## 2021-06-13 RX ADMIN — Medication 10 ML: at 14:00

## 2021-06-13 RX ADMIN — LORAZEPAM 0.25 MG: 0.5 TABLET ORAL at 00:59

## 2021-06-13 RX ADMIN — MONTELUKAST 10 MG: 10 TABLET, FILM COATED ORAL at 08:42

## 2021-06-13 RX ADMIN — FAMOTIDINE 20 MG: 20 TABLET ORAL at 08:42

## 2021-06-13 RX ADMIN — ATENOLOL 12.5 MG: 25 TABLET ORAL at 01:06

## 2021-06-13 RX ADMIN — ROSUVASTATIN CALCIUM 40 MG: 10 TABLET, COATED ORAL at 21:16

## 2021-06-13 RX ADMIN — HEPARIN SODIUM 3000 UNITS: 1000 INJECTION INTRAVENOUS; SUBCUTANEOUS at 08:42

## 2021-06-13 RX ADMIN — ARFORMOTEROL TARTRATE 15 MCG: 15 SOLUTION RESPIRATORY (INHALATION) at 07:33

## 2021-06-13 RX ADMIN — SODIUM CHLORIDE 250 ML: 900 INJECTION, SOLUTION INTRAVENOUS at 21:18

## 2021-06-13 RX ADMIN — MORPHINE SULFATE 1 MG: 2 INJECTION, SOLUTION INTRAMUSCULAR; INTRAVENOUS at 01:00

## 2021-06-13 RX ADMIN — BUDESONIDE 500 MCG: 0.5 INHALANT RESPIRATORY (INHALATION) at 07:33

## 2021-06-13 RX ADMIN — HEPARIN SODIUM 14 UNITS/KG/HR: 10000 INJECTION, SOLUTION INTRAVENOUS at 07:58

## 2021-06-13 RX ADMIN — FAMOTIDINE 20 MG: 20 TABLET ORAL at 21:17

## 2021-06-13 NOTE — PROGRESS NOTES
Reason for Admission:       NSTEMI with CAD, Hyperlipidemia, Asthma    Per H&P:    \" 64 y.o. female with CAD, hyperlipidemia, and prior cardiac stent placement in 2011 presents with recurring substernal chest pain starting yesterday afternoon while cutting up trees in her garden. She took a sublingual nitroglycerin yesterday evening and the chest pain went away. She drove to the hospital but it looked busy so she decided to go back home since the pain was gone. However, it recurred today when she was out walking around in her garden. \"               RUR Score:   10%                  PCP: First and Last name:  Cherry Tuttle MD     Name of Practice:    Are you a current patient: Yes/No: Yes   Approximate date of last visit: Jan 2021   Can you participate in a virtual visit with your PCP: Yes                    Current Advanced Directive/Advance Care Plan: Full Code      Healthcare Decision Maker:   Click here to complete [address] including selection of the Healthcare Decision Maker Relationship (ie \"Primary\")             Primary Decision Maker: Modesta Chopra - Daughter - [phone]    Secondary Decision Maker: Vinicius Boss - Other Relative - [phone]                  Transition of Care Plan:       CM met with pt at bedside. Pt states that she is independent and denies the use of dme at home. Her 23 y.o  daughter lives with her and can assist her if needed. She states that she plans to drive herself home when she is discharged. No d/c needs identified at this time. CM continuing to follow pt. for d/c needs. Care Management Interventions  PCP Verified by CM:  Yes  Last Visit to PCP: 01/01/21  Mode of Transport at Discharge: Self  Current Support Network: Own Home (Daughter lives with pt)  Confirm Follow Up Transport: Family  The Plan for Transition of Care is Related to the Following Treatment Goals : NSTEMI with CAD, Hyperlipidemia, Asthma  Discharge Location  Discharge Placement: Home with family assistance

## 2021-06-13 NOTE — ROUTINE PROCESS
Cardiac Cath Lab:  Pre Procedure Chart Check     Patients chart was accessed and reviewed for possible and/or scheduled procedure. Creatinine Clearance:  Serum creatinine: 0.59 mg/dL (L) 06/13/21 0650  Estimated creatinine clearance: 78.4 mL/min (A)    Total Contrast  Load:  3 x estimated clearance amount=  235.2ml    75% of Contrast Load:  0.75 x Total Contrast Load=    176.4ml    Recent Labs     06/13/21  0650 06/12/21  1920   WBC 9.5 7.6   RBC 4.38 4.83   HCT 40.7 45.3*   HGB 13.4 14.9    289   INR  --  1.0   APTT 57.8* 28.9   PTP  --  13.0    142   K 3.8 3.6   BUN 13 17   CREA 0.59* 0.93   GFRAA >60 >60   GFRNA >60 >60   CA 8.6 9.9   * 166   CKMB 63.9* 6.5*   CKND1 11.5* 3.9   TROIQ 6.90* 0.71*       BMI: Body mass index is 19.58 kg/m². ALLERGIES:   Allergies   Allergen Reactions    Zocor [Simvastatin] Hives    Biaxin [Clarithromycin] Other (comments)     Whelps      Ciprofloxacin Rash    Pcn [Penicillins] Rash    Plavix [Clopidogrel] Rash    Wellbutrin [Bupropion Hcl] Other (comments)     Whelps           Lines:        Peripheral IV 06/12/21 Left Antecubital (Active)   Site Assessment Clean, dry, & intact 06/13/21 0322   Phlebitis Assessment 0 06/13/21 0322   Infiltration Assessment 0 06/13/21 0322   Dressing Status Clean, dry, & intact 06/13/21 0322   Dressing Type Transparent;Tape 06/13/21 0322   Hub Color/Line Status Pink;Capped; Infusing 06/13/21 0322   Action Taken Open ports on tubing capped 06/13/21 0322   Alcohol Cap Used Yes 06/13/21 0322          History:    Past Medical History:   Diagnosis Date    Asthma     CAD (coronary artery disease)     Chest pain, unspecified 9/9/2012    Coronary atherosclerosis of native coronary artery     Depression     Epistaxis 12/11/2012    Fatigue 12/11/2012    GERD (gastroesophageal reflux disease)     Ill-defined condition     allergies    Ill-defined condition     anal mole and cyst    Left arm pain 8/7/2014    Nausea & vomiting     Other and unspecified hyperlipidemia 6/1/2011    Other and unspecified hyperlipidemia     Ovarian cyst      Past Surgical History:   Procedure Laterality Date    HX APPENDECTOMY      HX BREAST BIOPSY      right cyst    HX CHOLECYSTECTOMY      HX CORONARY STENT PLACEMENT  05/05/11    stent mid right coronary artery stenosis    HX GYN      dysplasia    HX HEART CATHETERIZATION  05/11    stent placement mid coronary artery    HX HYSTERECTOMY      HX KNEE ARTHROSCOPY      left    HX OOPHORECTOMY      HX TONSILLECTOMY       Patient Active Problem List   Diagnosis Code    GERD (gastroesophageal reflux disease) K21.9    Depression F32.9    Coronary atherosclerosis of native coronary artery I25.10    Hyperlipidemia E78.5    Chest pain, unspecified R07.9    Epistaxis R04.0    Fatigue R53.83    Left arm pain M79.602    Chest pain R07.9    NSTEMI (non-ST elevated myocardial infarction) (HCC) I21.4    Asthma J45.909    Paroxysmal atrial fibrillation (HCC) I48.0    Tobacco use Z72.0

## 2021-06-13 NOTE — PROGRESS NOTES
Problem: Falls - Risk of  Goal: *Absence of Falls  Description: Document Onalee Gum Fall Risk and appropriate interventions in the flowsheet.   Outcome: Progressing Towards Goal  Note: Fall Risk Interventions:            Medication Interventions: Teach patient to arise slowly

## 2021-06-13 NOTE — PROGRESS NOTES
TRANSFER - IN REPORT:    Verbal report received from Daniele Colorado RN(name) on Moira Mendoza  being received from ED(unit) for routine progression of care      Report consisted of patients Situation, Background, Assessment and   Recommendations(SBAR). Information from the following report(s) SBAR, Kardex, ED Summary, Intake/Output, MAR, Recent Results, Med Rec Status and Cardiac Rhythm SB was reviewed with the receiving nurse. Opportunity for questions and clarification was provided. Assessment completed upon patients arrival to unit and care assumed.         0322  Admission assessment complete  Required documentation complete  PTA meds record complete  Heparin gtt signed off with ED nurse   Pt a&ox4; chest rising and falling evenly with eyes opening spontaneously   No c/o chest pain     0600  Called lab regarding ptt ordered for 0400 - they stated they will send phlebotomist to receive     3 [facility] Cardiac/Medical Night Shift Chart Audit    Chart Audit completed? YES    Bedside and Verbal shift change report given to Moira Bean RN (oncoming nurse) by Kathleen Mazariegos RN (offgoing nurse).  Report included the following information SBAR, Kardex, ED Summary, Intake/Output, MAR, Recent Results, Med Rec Status and Cardiac Rhythm SB.

## 2021-06-13 NOTE — ROUTINE PROCESS
TRANSFER - OUT REPORT:  Verbal report given to [facility] RN(name) on Mallory Boards being transferred to Tele(unit) for routine progression of care   Report consisted of patients Situation, Background, Assessment and   Recommendations(SBAR). Information from the following report(s) Procedure Summary was reviewed with the receiving nurse. Cath Lab Report:    Procedure: [facility] WITH PCI STENT TO CX    Access site:  RIGHT RADIAL    Sheath:    REMOVED          Closure:     TR BAND REMOVED     Site Assessment:  RIGHT RADIAL SOFT NOTE BRUISING TO SITE, NO SWELLING. CIRCULATION AND SENSATION ADEQUATE TO RIGHT HAND. Opportunity for questions and clarification was provided. POST TR BAND REMOVAL INSTRUCTIONS GIVEN, VERBALIZES UNDERSTANDING.

## 2021-06-13 NOTE — ED NOTES
TRANSFER - OUT REPORT:    Verbal report given to Gala(name) on Khalif Cohn  being transferred to tele(unit) for routine progression of care       Report consisted of patients Situation, Background, Assessment and   Recommendations(SBAR). Information from the following report(s) SBAR, MAR and Cardiac Rhythm SR was reviewed with the receiving nurse. Lines:   Peripheral IV 06/12/21 Left Antecubital (Active)   Site Assessment Clean, dry, & intact 06/12/21 1937   Phlebitis Assessment 0 06/12/21 1937   Infiltration Assessment 0 06/12/21 1937   Dressing Status Clean, dry, & intact 06/12/21 1937   Hub Color/Line Status Pink 06/12/21 1937        Opportunity for questions and clarification was provided.       Patient transported with:   Monitor  Registered Nurse

## 2021-06-13 NOTE — ROUTINE PROCESS
1900 TR BAND STARTED TO BE REMOVED PER PROTCOL. 1940 BAND COMPLETELY REMOVED , SITE WITH SWELLING , NOTE BRUISING AT SITE, DENIES TENDERNESS. DRESSED WITH STERIL 2X2 AND TRANSPARENT DRESSING.

## 2021-06-13 NOTE — PROGRESS NOTES
0720 : assumed care of patient from [address], heparin drip verified with offgoing rn  1418 : TRANSFER - OUT REPORT:    Verbal report given to [facility] RN(name) on Khalif Mis  being transferred to Care Unit(unit) for ordered procedure       Report consisted of patients Situation, Background, Assessment and   Recommendations(SBAR). Information from the following report(s) SBAR, Kardex, Intake/Output and MAR was reviewed with the receiving nurse. Lines:   Peripheral IV 06/12/21 Left Antecubital (Active)   Site Assessment Clean, dry, & intact 06/13/21 0322   Phlebitis Assessment 0 06/13/21 0322   Infiltration Assessment 0 06/13/21 0322   Dressing Status Clean, dry, & intact 06/13/21 0322   Dressing Type Transparent;Tape 06/13/21 0322   Hub Color/Line Status Pink;Capped; Infusing 06/13/21 0322   Action Taken Open ports on tubing capped 06/13/21 0322   Alcohol Cap Used Yes 06/13/21 0322        Opportunity for questions and clarification was provided. Patient transported with:   Tech      1910 : Bedside shift change report given to Severo Dade RN (oncoming nurse) by Geeta Dodd RN (offgoing nurse). Report included the following information SBAR, Kardex, Intake/Output and MAR.

## 2021-06-13 NOTE — CONSULTS
[facility]      Patient: Abiodun Alcantar MRN: [id number]  SSN: [id number]    YOB: 1960  Age: 64 y.o. Sex: female        Date of Consultation: 6/13/2021  Referring Physician: Dr. Kim Rincon  Reason for Consultation: Chest pain and non-STEMI    HPI:  I was asked by  and non-STEMI. Adrien BarrNuria Ding is a 45-year-old very pleasant patient came to the hospital with progressive intermittent chest pain and diagnosed with non-STEMI. Patient started having some chest pain and bandlike feeling in the lower part of the chest on Friday drove to the ER took sublingual nitroglycerin in the parking lot and pain subsided patient went back home without coming into ER. Patient continued to feel unwell with intermittent chest discomfort without any nausea or vomiting or shortness of breath last night came back to the ER found to have mild troponin elevation and admitted with non-STEMI. Patient have history of coronary artery stenting done in 2011. In the past patient was allergic to Plavix was able to tolerate Effient. No history of TIA or stroke.     Past Medical History:   Diagnosis Date    Asthma     CAD (coronary artery disease)     Chest pain, unspecified 9/9/2012    Coronary atherosclerosis of native coronary artery     Depression     Epistaxis 12/11/2012    Fatigue 12/11/2012    GERD (gastroesophageal reflux disease)     Ill-defined condition     allergies    Ill-defined condition     anal mole and cyst    Left arm pain 8/7/2014    Nausea & vomiting     Other and unspecified hyperlipidemia 6/1/2011    Other and unspecified hyperlipidemia     Ovarian cyst      Past Surgical History:   Procedure Laterality Date    HX APPENDECTOMY      HX BREAST BIOPSY      right cyst    HX CHOLECYSTECTOMY      HX CORONARY STENT PLACEMENT  05/05/11    stent mid right coronary artery stenosis    HX GYN      dysplasia    HX HEART CATHETERIZATION  05/11    stent placement mid coronary artery    HX HYSTERECTOMY      HX KNEE ARTHROSCOPY      left    HX OOPHORECTOMY      HX TONSILLECTOMY       Current Facility-Administered Medications   Medication Dose Route Frequency    furosemide (LASIX) tablet 20 mg  20 mg Oral DAILY    potassium chloride (KLOR-CON) tablet 10 mEq  10 mEq Oral DAILY    perflutren lipid microspheres (DEFINITY) in NS bolus IV  1 mL IntraVENous RAD ONCE    heparin 25,000 units in D5W 250 ml infusion  12-25 Units/kg/hr IntraVENous TITRATE    montelukast (SINGULAIR) tablet 10 mg  10 mg Oral DAILY    famotidine (PEPCID) tablet 20 mg  20 mg Oral BID    morphine injection 2 mg  2 mg IntraVENous Q4H PRN    sodium chloride (NS) flush 5-40 mL  5-40 mL IntraVENous Q8H    sodium chloride (NS) flush 5-40 mL  5-40 mL IntraVENous PRN    acetaminophen (TYLENOL) tablet 650 mg  650 mg Oral Q6H PRN    Or    acetaminophen (TYLENOL) suppository 650 mg  650 mg Rectal Q6H PRN    polyethylene glycol (MIRALAX) packet 17 g  17 g Oral DAILY PRN    ondansetron (ZOFRAN ODT) tablet 4 mg  4 mg Oral Q8H PRN    Or    ondansetron (ZOFRAN) injection 4 mg  4 mg IntraVENous Q6H PRN    aspirin chewable tablet 81 mg  81 mg Oral DAILY    budesonide (PULMICORT) 500 mcg/2 ml nebulizer suspension  500 mcg Nebulization BID RT    arformoteroL (BROVANA) neb solution 15 mcg  15 mcg Nebulization BID RT    rosuvastatin (CRESTOR) tablet 40 mg  40 mg Oral QHS    LORazepam (ATIVAN) tablet 0.25 mg  0.25 mg Oral QHS PRN    atenoloL (TENORMIN) tablet 12.5 mg  12.5 mg Oral QHS       Allergies and Intolerances:    Allergies   Allergen Reactions    Zocor [Simvastatin] Hives    Biaxin [Clarithromycin] Other (comments)     Whelps      Ciprofloxacin Rash    Pcn [Penicillins] Rash    Plavix [Clopidogrel] Rash    Wellbutrin [Bupropion Hcl] Other (comments)     Whelps           Family History:   Family History   Problem Relation Age of Onset    Coronary Artery Disease Mother 46    Heart Attack Mother 46       Social History: She  reports that she has been smoking cigarettes. She has a 24.00 pack-year smoking history. She has never used smokeless tobacco.  She  reports no history of alcohol use. Review of Systems:     Review of Systems  Gen: No fever, chills, malaise, weight loss/gain. Heent: No headache, rhinorrhea, epistaxis, ear pain, hearing loss, sinus pain, neck pain/stiffness, sore throat. Heart: + chest pain, palpitations, AYALA, pnd, or orthopnea. Resp: No cough, hemoptysis, wheezing and shortness of breath. GI: No nausea, vomiting, diarrhea, constipation, melena or hematochezia. : No urinary obstruction, dysuria or hematuria. Derm: No rash, new skin lesion or pruritis. Musc/skeletal: no bone or joint complains. Vasc: No edema, cyanosis or claudication. Endo: No heat/cold intolerance, no polyuria,polydipsia or polyphagia. Neuro: No unilateral weakness, numbness, tingling. No seizures. Heme: No easy bruising or bleeding. Physical:   Patient Vitals for the past 6 hrs:   Temp Pulse Resp BP SpO2   06/13/21 1156    (!) 113/55    06/13/21 1154 98.3 °F (36.8 °C) 62 15 (!) 112/53 100 %   06/13/21 1114    (!) 104/40    06/13/21 0839 98.6 °F (37 °C) 62 16 (!) 104/55 97 %   06/13/21 0737     96 %         Exam:   General Appearance: Comfortable, not using accessory muscles of respiration. HEENT: DANIELLA. HEAD: Atraumatic  NECK: No JVD, no thyroidomeglay. LUNGS: Clear bilaterally. HEART: S1+S2     ABD: Non-tender, BS Audible    EXT: No edema, and no cysnosis. VASCULAR EXAM: Pulses are intact. PSYCHIATRIC EXAM: Mood is appropriate. MUSCULOSKELETAL: Grossly no joint deformity. NEUROLOGICAL: Motor and sensory sytem intact and Cranial nerves II-XII intact.     Review of Data:   LABS:   Lab Results   Component Value Date/Time    WBC 9.5 06/13/2021 06:50 AM    HGB 13.4 06/13/2021 06:50 AM    HCT 40.7 06/13/2021 06:50 AM    PLATELET 284 50/01/7016 06:50 AM     Lab Results   Component Value Date/Time    Sodium 142 06/13/2021 06:50 AM    Potassium 3.8 06/13/2021 06:50 AM    Chloride 108 06/13/2021 06:50 AM    CO2 28 06/13/2021 06:50 AM    Glucose 106 (H) 06/13/2021 06:50 AM    BUN 13 06/13/2021 06:50 AM    Creatinine 0.59 (L) 06/13/2021 06:50 AM     Lab Results   Component Value Date/Time    Cholesterol, total 204 (H) 06/12/2021 07:20 PM    HDL Cholesterol 71 (H) 06/12/2021 07:20 PM    LDL, calculated 100.2 (H) 06/12/2021 07:20 PM    Triglyceride 164 (H) 06/12/2021 07:20 PM     No components found for: GPT  No results found for: HBA1C, WIO2SNYS, PGB2RNZL    RADIOLOGY:  CT Results  (Last 48 hours)    None        CXR Results  (Last 48 hours)               06/12/21 1927  XR CHEST PORT Final result    Impression:  No acute process       Narrative:  EXAM:  AP Portable Chest X-ray 1 view        INDICATION: Chest pain       COMPARISON: March 31, 2019       _______________       FINDINGS:  Heart and mediastinal contours are within normal limits for portable   radiograph. Lungs are clear of active disease. There are no pleural effusions. No acute osseous findings.        ________________                       Cardiology Procedures:   Results for orders placed or performed during the hospital encounter of 06/12/21   EKG, 12 LEAD, INITIAL   Result Value Ref Range    Ventricular Rate 78 BPM    Atrial Rate 78 BPM    P-R Interval 146 ms    QRS Duration 88 ms    Q-T Interval 360 ms    QTC Calculation (Bezet) 410 ms    Calculated P Axis 73 degrees    Calculated R Axis 71 degrees    Calculated T Axis 69 degrees    Diagnosis       Normal sinus rhythm  Possible Left atrial enlargement  ST depression, consider subendocardial injury or digitalis effect  Abnormal ECG  When compared with ECG of 31-MAR-2019 16:15,  ST now depressed in Inferior leads     Results for orders placed or performed in visit on 09/22/11   AMB POC EKG ROUTINE W/ 12 LEADS, INTER & REP    Impression    See scanned report        Echo Results  (Last 48 hours)    None       Cardiolite (Tc-99m Sestamibi) stress test        Impression / Plan:    Patient Active Problem List   Diagnosis Code    GERD (gastroesophageal reflux disease) K21.9    Depression F32.9    Coronary atherosclerosis of native coronary artery I25.10    Hyperlipidemia E78.5    Chest pain, unspecified R07.9    Epistaxis R04.0    Fatigue R53.83    Left arm pain M79.602    Chest pain R07.9    NSTEMI (non-ST elevated myocardial infarction) ([facility]) I21.4    Asthma J45.909    Paroxysmal atrial fibrillation ([facility]) I48.0    Tobacco use Z72.0       Assessment and plan      Non-STEMI  CAD  Hyperlipidemia    Plan  Overall patient is a feeling better but still have intermittent chest discomfort and troponin is trending. Echocardiogram reviewed EF is stable. Due to ongoing intermittent symptoms and troponin elevation I will call the Cath Lab for cardiac catheterization possible PCI.     Risk, benefits and alternatives were discussed in detail patient agreed to proceed  Continue with aspirin, heparin, statins, low-dose atenolol patient have mild or minimal bradycardia at rest.        Signed By: Aydee Zarco MD     June 13, 2021

## 2021-06-14 VITALS
HEIGHT: 67 IN | HEART RATE: 57 BPM | WEIGHT: 125 LBS | SYSTOLIC BLOOD PRESSURE: 107 MMHG | TEMPERATURE: 98 F | DIASTOLIC BLOOD PRESSURE: 56 MMHG | BODY MASS INDEX: 19.62 KG/M2 | RESPIRATION RATE: 18 BRPM | OXYGEN SATURATION: 100 %

## 2021-06-14 LAB
ALBUMIN SERPL-MCNC: 3.1 G/DL (ref 3.4–5)
ALBUMIN/GLOB SERPL: 1.3 {RATIO} (ref 0.8–1.7)
ALP SERPL-CCNC: 82 U/L (ref 45–117)
ALT SERPL-CCNC: 26 U/L (ref 13–56)
ANION GAP SERPL CALC-SCNC: 5 MMOL/L (ref 3–18)
ANION GAP SERPL CALC-SCNC: 5 MMOL/L (ref 3–18)
AST SERPL-CCNC: 66 U/L (ref 10–38)
BASOPHILS # BLD: 0 K/UL (ref 0–0.1)
BASOPHILS NFR BLD: 0 % (ref 0–2)
BILIRUB SERPL-MCNC: 0.4 MG/DL (ref 0.2–1)
BUN SERPL-MCNC: 12 MG/DL (ref 7–18)
BUN SERPL-MCNC: 13 MG/DL (ref 7–18)
BUN/CREAT SERPL: 16 (ref 12–20)
BUN/CREAT SERPL: 21 (ref 12–20)
CALCIUM SERPL-MCNC: 8.3 MG/DL (ref 8.5–10.1)
CALCIUM SERPL-MCNC: 9.1 MG/DL (ref 8.5–10.1)
CHLORIDE SERPL-SCNC: 108 MMOL/L (ref 100–111)
CHLORIDE SERPL-SCNC: 112 MMOL/L (ref 100–111)
CHOLEST SERPL-MCNC: 131 MG/DL
CK MB CFR SERPL CALC: 4.8 % (ref 0–4)
CK MB SERPL-MCNC: 15.4 NG/ML (ref 5–25)
CK SERPL-CCNC: 320 U/L (ref 26–192)
CO2 SERPL-SCNC: 27 MMOL/L (ref 21–32)
CO2 SERPL-SCNC: 29 MMOL/L (ref 21–32)
CRD SYSTOLIC BP: 89
CREAT SERPL-MCNC: 0.63 MG/DL (ref 0.6–1.3)
CREAT SERPL-MCNC: 0.74 MG/DL (ref 0.6–1.3)
DIFFERENTIAL METHOD BLD: ABNORMAL
EOSINOPHIL # BLD: 0.1 K/UL (ref 0–0.4)
EOSINOPHIL NFR BLD: 1 % (ref 0–5)
ERYTHROCYTE [DISTWIDTH] IN BLOOD BY AUTOMATED COUNT: 13.2 % (ref 11.6–14.5)
GLOBULIN SER CALC-MCNC: 2.3 G/DL (ref 2–4)
GLUCOSE SERPL-MCNC: 113 MG/DL (ref 74–99)
GLUCOSE SERPL-MCNC: 88 MG/DL (ref 74–99)
HCT VFR BLD AUTO: 39.1 % (ref 35–45)
HDLC SERPL-MCNC: 58 MG/DL (ref 40–60)
HDLC SERPL: 2.3 {RATIO} (ref 0–5)
HGB BLD-MCNC: 12.7 G/DL (ref 12–16)
LDLC SERPL CALC-MCNC: 61.6 MG/DL (ref 0–100)
LIPID PROFILE,FLP: NORMAL
LYMPHOCYTES # BLD: 1 K/UL (ref 0.9–3.6)
LYMPHOCYTES NFR BLD: 16 % (ref 21–52)
MCH RBC QN AUTO: 30.5 PG (ref 24–34)
MCHC RBC AUTO-ENTMCNC: 32.5 G/DL (ref 31–37)
MCV RBC AUTO: 94 FL (ref 74–97)
MONOCYTES # BLD: 0.4 K/UL (ref 0.05–1.2)
MONOCYTES NFR BLD: 6 % (ref 3–10)
NEUTS SEG # BLD: 4.6 K/UL (ref 1.8–8)
NEUTS SEG NFR BLD: 76 % (ref 40–73)
PLATELET # BLD AUTO: 216 K/UL (ref 135–420)
PMV BLD AUTO: 10.7 FL (ref 9.2–11.8)
POTASSIUM SERPL-SCNC: 3.5 MMOL/L (ref 3.5–5.5)
POTASSIUM SERPL-SCNC: 4.2 MMOL/L (ref 3.5–5.5)
PROT SERPL-MCNC: 5.4 G/DL (ref 6.4–8.2)
RBC # BLD AUTO: 4.16 M/UL (ref 4.2–5.3)
SODIUM SERPL-SCNC: 142 MMOL/L (ref 136–145)
SODIUM SERPL-SCNC: 144 MMOL/L (ref 136–145)
TRIGL SERPL-MCNC: 57 MG/DL (ref ?–150)
TROPONIN I SERPL-MCNC: 5.43 NG/ML (ref 0–0.04)
VLDLC SERPL CALC-MCNC: 11.4 MG/DL
WBC # BLD AUTO: 6 K/UL (ref 4.6–13.2)

## 2021-06-14 PROCEDURE — 74011250636 HC RX REV CODE- 250/636: Performed by: FAMILY MEDICINE

## 2021-06-14 PROCEDURE — 36415 COLL VENOUS BLD VENIPUNCTURE: CPT

## 2021-06-14 PROCEDURE — 74011000250 HC RX REV CODE- 250: Performed by: FAMILY MEDICINE

## 2021-06-14 PROCEDURE — 85025 COMPLETE CBC W/AUTO DIFF WBC: CPT

## 2021-06-14 PROCEDURE — 74011250637 HC RX REV CODE- 250/637: Performed by: FAMILY MEDICINE

## 2021-06-14 PROCEDURE — 80061 LIPID PANEL: CPT

## 2021-06-14 PROCEDURE — 74011250636 HC RX REV CODE- 250/636: Performed by: INTERNAL MEDICINE

## 2021-06-14 PROCEDURE — 80053 COMPREHEN METABOLIC PANEL: CPT

## 2021-06-14 PROCEDURE — 74011250637 HC RX REV CODE- 250/637: Performed by: INTERNAL MEDICINE

## 2021-06-14 PROCEDURE — 94640 AIRWAY INHALATION TREATMENT: CPT

## 2021-06-14 PROCEDURE — 82553 CREATINE MB FRACTION: CPT

## 2021-06-14 RX ORDER — SODIUM CHLORIDE 9 MG/ML
50 INJECTION, SOLUTION INTRAVENOUS CONTINUOUS
Status: DISCONTINUED | OUTPATIENT
Start: 2021-06-14 | End: 2021-06-14 | Stop reason: HOSPADM

## 2021-06-14 RX ORDER — HYDROCORTISONE 25 MG/G
CREAM TOPICAL 3 TIMES DAILY
Qty: 30 G | Refills: 0 | Status: SHIPPED | OUTPATIENT
Start: 2021-06-14

## 2021-06-14 RX ORDER — LORAZEPAM 0.5 MG/1
0.75 TABLET ORAL
Status: DISCONTINUED | OUTPATIENT
Start: 2021-06-14 | End: 2021-06-14

## 2021-06-14 RX ORDER — ENOXAPARIN SODIUM 100 MG/ML
40 INJECTION SUBCUTANEOUS EVERY 24 HOURS
Status: DISCONTINUED | OUTPATIENT
Start: 2021-06-14 | End: 2021-06-14 | Stop reason: HOSPADM

## 2021-06-14 RX ORDER — SODIUM CHLORIDE 0.9 % (FLUSH) 0.9 %
5-40 SYRINGE (ML) INJECTION AS NEEDED
Status: DISCONTINUED | OUTPATIENT
Start: 2021-06-14 | End: 2021-06-14 | Stop reason: HOSPADM

## 2021-06-14 RX ORDER — ROSUVASTATIN CALCIUM 40 MG/1
40 TABLET, COATED ORAL
Qty: 30 TABLET | Refills: 0 | Status: SHIPPED | OUTPATIENT
Start: 2021-06-14

## 2021-06-14 RX ORDER — SODIUM CHLORIDE 0.9 % (FLUSH) 0.9 %
5-40 SYRINGE (ML) INJECTION EVERY 8 HOURS
Status: DISCONTINUED | OUTPATIENT
Start: 2021-06-14 | End: 2021-06-14 | Stop reason: HOSPADM

## 2021-06-14 RX ORDER — HYDROCORTISONE 25 MG/G
CREAM TOPICAL 3 TIMES DAILY
Status: DISCONTINUED | OUTPATIENT
Start: 2021-06-14 | End: 2021-06-14 | Stop reason: HOSPADM

## 2021-06-14 RX ORDER — LORAZEPAM 0.5 MG/1
0.5 TABLET ORAL
Status: DISCONTINUED | OUTPATIENT
Start: 2021-06-14 | End: 2021-06-14 | Stop reason: HOSPADM

## 2021-06-14 RX ADMIN — ASPIRIN 81 MG: 81 TABLET, CHEWABLE ORAL at 09:19

## 2021-06-14 RX ADMIN — HYDROCORTISONE: 25 CREAM TOPICAL at 18:15

## 2021-06-14 RX ADMIN — Medication 10 ML: at 14:00

## 2021-06-14 RX ADMIN — SODIUM CHLORIDE 500 ML: 900 INJECTION, SOLUTION INTRAVENOUS at 02:35

## 2021-06-14 RX ADMIN — FUROSEMIDE 20 MG: 20 TABLET ORAL at 09:19

## 2021-06-14 RX ADMIN — HYDROCORTISONE: 25 CREAM TOPICAL at 11:43

## 2021-06-14 RX ADMIN — BUDESONIDE 500 MCG: 0.5 INHALANT RESPIRATORY (INHALATION) at 08:12

## 2021-06-14 RX ADMIN — Medication 10 ML: at 08:00

## 2021-06-14 RX ADMIN — TICAGRELOR 90 MG: 90 TABLET ORAL at 20:04

## 2021-06-14 RX ADMIN — ARFORMOTEROL TARTRATE 15 MCG: 15 SOLUTION RESPIRATORY (INHALATION) at 08:12

## 2021-06-14 RX ADMIN — FAMOTIDINE 20 MG: 20 TABLET ORAL at 09:18

## 2021-06-14 RX ADMIN — TICAGRELOR 90 MG: 90 TABLET ORAL at 09:19

## 2021-06-14 RX ADMIN — MONTELUKAST 10 MG: 10 TABLET, FILM COATED ORAL at 09:18

## 2021-06-14 RX ADMIN — ENOXAPARIN SODIUM 40 MG: 40 INJECTION SUBCUTANEOUS at 09:19

## 2021-06-14 RX ADMIN — POTASSIUM CHLORIDE 10 MEQ: 750 TABLET, EXTENDED RELEASE ORAL at 09:18

## 2021-06-14 NOTE — PROGRESS NOTES
Assumed care of pt from [employer]. Pt had red welts on abd and chest from electrodes. Dr. Crystal Powers was made aware and gave verbal order to remove leads. Cream was ordered. 1143: Applied cream to pts red welts.

## 2021-06-14 NOTE — PROGRESS NOTES
Problem: Falls - Risk of  Goal: *Absence of Falls  Description: Document Dawna Reynoso Fall Risk and appropriate interventions in the flowsheet.   Outcome: Progressing Towards Goal  Note: Fall Risk Interventions:            Medication Interventions: Bed/chair exit alarm, Patient to call before getting OOB, Teach patient to arise slowly                   Problem: Patient Education: Go to Patient Education Activity  Goal: Patient/Family Education  Outcome: Progressing Towards Goal     Problem: Anxiety  Goal: *Alleviation of anxiety  Outcome: Progressing Towards Goal  Goal: *Alleviation of anxiety (Palliative Care)  Outcome: Progressing Towards Goal     Problem: Patient Education: Go to Patient Education Activity  Goal: Patient/Family Education  Outcome: Progressing Towards Goal     Problem: Pain  Goal: *Control of Pain  Outcome: Progressing Towards Goal  Goal: *PALLIATIVE CARE:  Alleviation of Pain  Outcome: Progressing Towards Goal     Problem: Patient Education: Go to Patient Education Activity  Goal: Patient/Family Education  Outcome: Progressing Towards Goal     Problem: Patient Education: Go to Patient Education Activity  Goal: Patient/Family Education  Outcome: Progressing Towards Goal     Problem: Cath Lab Procedures: Pre-Procedure  Goal: Off Pathway (Use only if patient is Off Pathway)  Outcome: Progressing Towards Goal  Goal: Activity/Safety  Outcome: Progressing Towards Goal  Goal: Consults, if ordered  Outcome: Progressing Towards Goal  Goal: Diagnostic Test/Procedures  Outcome: Progressing Towards Goal  Goal: Nutrition/Diet  Outcome: Progressing Towards Goal  Goal: Discharge Planning  Outcome: Progressing Towards Goal  Goal: Medications  Outcome: Progressing Towards Goal  Goal: Respiratory  Outcome: Progressing Towards Goal  Goal: Treatments/Interventions/Procedures  Outcome: Progressing Towards Goal  Goal: Psychosocial  Outcome: Progressing Towards Goal  Goal: *Verbalize description of procedure  Outcome: Progressing Towards Goal  Goal: *Consent signed  Outcome: Progressing Towards Goal     Problem: Cath Lab Procedures: Post-Cath Day of Procedure (Initiate SCIP Measures for Post-Op Care)  Goal: Off Pathway (Use only if patient is Off Pathway)  Outcome: Progressing Towards Goal  Goal: Activity/Safety  Outcome: Progressing Towards Goal  Goal: Consults, if ordered  Outcome: Progressing Towards Goal  Goal: Diagnostic Test/Procedures  Outcome: Progressing Towards Goal  Goal: Nutrition/Diet  Outcome: Progressing Towards Goal  Goal: Discharge Planning  Outcome: Progressing Towards Goal  Goal: Medications  Outcome: Progressing Towards Goal  Goal: Respiratory  Outcome: Progressing Towards Goal  Goal: Treatments/Interventions/Procedures  Outcome: Progressing Towards Goal  Goal: Psychosocial  Outcome: Progressing Towards Goal  Goal: *Procedure site is without bleeding and signs of infection six hours post sheath removal  Outcome: Progressing Towards Goal  Goal: *Hemodynamically stable  Outcome: Progressing Towards Goal  Goal: *Optimal pain control at patient's stated goal  Outcome: Progressing Towards Goal     Problem: Cath Lab Procedures: Post-Cath Day 1  Goal: Off Pathway (Use only if patient is Off Pathway)  Outcome: Progressing Towards Goal  Goal: Activity/Safety  Outcome: Progressing Towards Goal  Goal: Diagnostic Test/Procedures  Outcome: Progressing Towards Goal  Goal: Nutrition/Diet  Outcome: Progressing Towards Goal  Goal: Discharge Planning  Outcome: Progressing Towards Goal  Goal: Medications  Outcome: Progressing Towards Goal  Goal: Respiratory  Outcome: Progressing Towards Goal  Goal: Treatments/Interventions/Procedures  Outcome: Progressing Towards Goal  Goal: Psychosocial  Outcome: Progressing Towards Goal     Problem: Cath Lab Procedures: Discharge Outcomes  Goal: *Stable cardiac rhythm  Outcome: Progressing Towards Goal  Goal: *Hemodynamically stable  Outcome: Progressing Towards Goal  Goal: *Optimal pain control at patient's stated goal  Outcome: Progressing Towards Goal  Goal: *Pulses palpable, skin color within defined limits, skin temperature warm  Outcome: Progressing Towards Goal  Goal: *Lungs clear or at baseline  Outcome: Progressing Towards Goal  Goal: *Demonstrates ability to perform prescribed activity without shortness of breath or discomfort  Outcome: Progressing Towards Goal  Goal: *Verbalizes home exercise program, activity guidelines, cardiac precautions  Outcome: Progressing Towards Goal  Goal: *Verbalizes understanding and describes prescribed diet  Outcome: Progressing Towards Goal  Goal: *Verbalizes understanding and describes medication purposes and frequencies  Outcome: Progressing Towards Goal  Goal: *Identifies cardiac risk factors  Outcome: Progressing Towards Goal  Goal: *No signs and symptoms of infection or wound complications  Outcome: Progressing Towards Goal  Goal: *Anxiety reduced or absent  Outcome: Progressing Towards Goal  Goal: *Verbalizes and demonstrates incision care  Outcome: Progressing Towards Goal  Goal: *Understands and describes signs and symptoms to report to providers(Stroke Metric)  Outcome: Progressing Towards Goal  Goal: *Describes follow-up/return visits to physicians  Outcome: Progressing Towards Goal  Goal: *Describes available resources and support systems  Outcome: Progressing Towards Goal  Goal: *Influenza immunization  Outcome: Progressing Towards Goal  Goal: *Pneumococcal immunization  Outcome: Progressing Towards Goal

## 2021-06-14 NOTE — DISCHARGE SUMMARY
Discharge Summary    Patient: Moira Mendoza MRN: [id number]  CSN: [id number]    YOB: 1960  Age: 64 y.o. Sex: female    DOA: 6/12/2021 LOS:  LOS: 2 days   Discharge Date:      Primary Care Provider:  Norma Meneses MD    Admission Diagnoses: ACS (acute coronary syndrome) [facility]) [I24.9]    Discharge Diagnoses:    Problem List as of 6/14/2021 Date Reviewed: 8/27/2014        Codes Class Noted - Resolved    Paroxysmal atrial fibrillation ([facility] ICD-10-CM: I48.0  ICD-9-CM: 427.31  6/13/2021 - Present        Tobacco use ICD-10-CM: Z72.0  ICD-9-CM: 305.1  6/13/2021 - Present        * (Principal) NSTEMI (non-ST elevated myocardial infarction) ([facility] ICD-10-CM: I21.4  ICD-9-CM: 410.70  6/12/2021 - Present        Asthma ICD-10-CM: J45.909  ICD-9-CM: 493.90  6/12/2021 - Present        Chest pain ICD-10-CM: R07.9  ICD-9-CM: 786.50  8/27/2014 - Present        Left arm pain ICD-10-CM: M79.602  ICD-9-CM: 729.5  8/7/2014 - Present        Epistaxis ICD-10-CM: R04.0  ICD-9-CM: 784.7  12/11/2012 - Present        Fatigue ICD-10-CM: R53.83  ICD-9-CM: 780.79  12/11/2012 - Present        Chest pain, unspecified ICD-10-CM: R07.9  ICD-9-CM: 786.50  9/9/2012 - Present        Hyperlipidemia ICD-10-CM: E78.5  ICD-9-CM: 272.4  6/1/2011 - Present        Coronary atherosclerosis of native coronary artery ICD-10-CM: I25.10  ICD-9-CM: 414.01  Unknown - Present        GERD (gastroesophageal reflux disease) ICD-10-CM: K21.9  ICD-9-CM: 530.81  Unknown - Present        Depression ICD-10-CM: F32.9  ICD-9-CM: 311  Unknown - Present              Discharge Medications:     Current Discharge Medication List      START taking these medications    Details   ticagrelor (BRILINTA) 90 mg tablet Take 1 Tablet by mouth every twelve (12) hours every twelve (12) hours. Qty: 60 Tablet, Refills: 0  Start date: 6/14/2021      hydrocortisone (HYTONE) 2.5 % topical cream Apply  to affected area three (3) times daily.  use thin layer  Qty: 30 g, Refills: 0  Start date: 6/14/2021         CONTINUE these medications which have CHANGED    Details   rosuvastatin (Crestor) 40 mg tablet Take 1 Tablet by mouth nightly. Qty: 30 Tablet, Refills: 0  Start date: 6/14/2021         CONTINUE these medications which have NOT CHANGED    Details   famotidine (PEPCID) 20 mg tablet Take 20 mg by mouth daily. cyanocobalamin 1,000 mcg tablet Take 1,000 mcg by mouth daily. nitroglycerin (NITROSTAT) 0.4 mg SL tablet 1 tablet by SubLINGual route every five (5) minutes as needed for Chest Pain (total of 3 tabs for one episode of pain). Qty: 75 tablet, Refills: 3      montelukast (SINGULAIR) 10 mg tablet Take 10 mg by mouth daily. LORazepam (ATIVAN) 0.5 mg tablet Take 0.25 mg by mouth nightly. Pt states she is weaning herself off;  Pt takes 1/2 and 1/4 tablet  Indications: anxious      potassium chloride SR (KLOR-CON 10) 10 mEq tablet Take 10 mEq by mouth daily. aspirin 81 mg tablet Take 81 mg by mouth daily. STOP taking these medications       furosemide (LASIX) 20 mg tablet Comments:   Reason for Stopping:         atenolol (TENORMIN) 25 mg tablet Comments:   Reason for Stopping:         desloratadine (CLARINEX) 5 mg disintegrating tablet Comments:   Reason for Stopping:               Discharge Condition: Good    Procedures : Cath    Consults: Cardiology      PHYSICAL EXAM   Visit Vitals  BP (!) 107/56   Pulse (!) 57   Temp 98 °F (36.7 °C)   Resp 18   Ht 5' 7\" (1.702 m)   Wt 56.7 kg (125 lb)   SpO2 100%   Breastfeeding No   BMI 19.58 kg/m²     General: Awake, cooperative, no acute distress    HEENT: NC, Atraumatic. PERRLA, EOMI. Anicteric sclerae. Lungs:  CTA Bilaterally. No Wheezing/Rhonchi/Rales. Heart:  Regular  rhythm,  No murmur, No Rubs, No Gallops  Abdomen: Soft, Non distended, Non tender. +Bowel sounds,   Extremities: No c/c/e  Psych:   Not anxious or agitated. Neurologic:  No acute neurological deficits. Admission HPI   yesterday afternoon while cutting up trees in her garden. She took a sublingual nitroglycerin yesterday evening and the chest pain went away. She drove to the hospital but it looked busy so she decided to go back home since the pain was gone. However, it recurred today when she was out walking around in her garden. No fever, shortness of breath, nausea, vomiting, diarrhea, or leg swelling. She did not want to take morphine or nitroglycerin tablets in the ED despite ongoing chest pain. She did agree to nitropaste.   [facility] Course :   . NSTEMI  Admitted placed on heparin drip  Betablocker stopped due to hypotension   Cardiac Cath   Post stenting of circumflex started on Brilenta  Was only on half a tablet of crestor goal LDL under 70  Now started on full dose   Now on Brilenta     2. Occ Tob Use  Advised to quit     3. Anxiety  Wean off ativan as possible  Issue in hospital with security      4. Hyperlip  Cont crestor full dose  With vitamin D maybe coenzyme q     5. COPD  Continue Pulmicort  And Brovana     6.  Contact Derm for LEADS  Steroids cream   Activity: Activity as tolerated    Diet: Cardiac Diet    Follow-up: Pcp and Cardiology     Disposition: home     Minutes spent on discharge: 30 min       Labs: Results:       Chemistry Recent Labs     06/14/21  1345 06/14/21  0555 06/13/21  0650 06/12/21  1920   * 88 106* 109*    144 142 142   K 3.5 4.2 3.8 3.6    112* 108 106   CO2 29 27 28 31   BUN 12 13 13 17   CREA 0.74 0.63 0.59* 0.93   CA 9.1 8.3* 8.6 9.9   AGAP 5 5 6 5   BUCR 16 21* 22* 18   AP  --  82 90 113   TP  --  5.4* 6.0* 7.6   ALB  --  3.1* 3.5 4.2   GLOB  --  2.3 2.5 3.4   AGRAT  --  1.3 1.4 1.2      CBC w/Diff Recent Labs     06/14/21  1345 06/13/21  1515 06/13/21  0650 06/12/21  1920   WBC 6.0  --  9.5 7.6   RBC 4.16*  --  4.38 4.83   HGB 12.7 13.0 13.4 14.9   HCT 39.1 39.0 40.7 45.3*     --  251 289   GRANS 76*  --  77* 61   LYMPH 16*  --  15* 31 EOS 1  --  1 1      Cardiac Enzymes Recent Labs     06/14/21  1345 06/13/21  2159   * 538*   CKND1 4.8* 7.1*      Coagulation Recent Labs     06/13/21  1414 06/13/21  0650 06/12/21  1920   PTP  --   --  13.0   INR  --   --  1.0   APTT 155.6* 57.8* 28.9       Lipid Panel Lab Results   Component Value Date/Time    Cholesterol, total 131 06/14/2021 05:55 AM    HDL Cholesterol 58 06/14/2021 05:55 AM    LDL, calculated 61.6 06/14/2021 05:55 AM    VLDL, calculated 11.4 06/14/2021 05:55 AM    Triglyceride 57 06/14/2021 05:55 AM    CHOL/HDL Ratio 2.3 06/14/2021 05:55 AM      BNP No results for input(s): BNPP in the last 72 hours. Liver Enzymes Recent Labs     06/14/21  0555   TP 5.4*   ALB 3.1*   AP 82      Thyroid Studies Lab Results   Component Value Date/Time    TSH 2.86 06/12/2021 07:20 PM            Significant Diagnostic Studies: XR CHEST PORT    Result Date: 6/12/2021  EXAM:  AP Portable Chest X-ray 1 view INDICATION: Chest pain COMPARISON: March 31, 2019 _______________ FINDINGS:  Heart and mediastinal contours are within normal limits for portable radiograph. Lungs are clear of active disease. There are no pleural effusions. No acute osseous findings. ________________      No acute process    ECHO ADULT COMPLETE    Result Date: 6/13/2021  · LV: Estimated LVEF is 50 - 55%. Normal cavity size, wall thickness, systolic function (ejection fraction normal) and diastolic function. E'E= 6.53. · AV: Aortic valve focal thickening present. · MV: Mitral valve thickening. Moderate to severe mitral valve regurgitation is present. · TV: Mild tricuspid valve regurgitation is present. · PA: Pulmonary arterial systolic pressure is 31 mmHg. CARDIAC PROCEDURE    Result Date: 6/14/2021  Non-STEMI in due to 90% stenosis of the mid left circumflex artery treated with single drug-eluting stent 2.25X30 mm resolute ganga drug-eluting stent with excellent results.  Chronic mid LAD 40% disease without any interval change when compared with 2019 angiogram.  Chronic mid RCA 40% stenosis without any interval change when compared with 2019 angiogram. LVEDP is 10 mmHg. Findings discussed with the patient. Patient will continue with Brilinta for 1 year and baby aspirin lifelong and aggressive risk factors management. No results found for this or any previous visit.         CC: Stephanie Fernandez MD

## 2021-06-14 NOTE — PROGRESS NOTES
Hospitalist Progress Note    Patient: Rolando Beltrán MRN: [id number]  CSN: [id number]    YOB: 1960  Age: 64 y.o. Sex: female    DOA: 6/12/2021 LOS:  LOS: 1 day          Chief Complaint:  Hx of CAD, hyperlip, tobaco disorder  Came for the second time to ER  Yesterday stayed in parking lot  Today came in to ER  Found to have increase in Trop          Assessment/Plan     1. NSTEMi  Trending upwards  Now on heparin   Cont aspirn/statin, nitrates  Likely cath   Await cardio input    Patient Active Problem List   Diagnosis Code    GERD (gastroesophageal reflux disease) K21.9    Depression F32.9    Coronary atherosclerosis of native coronary artery I25.10    Hyperlipidemia E78.5    Chest pain, unspecified R07.9    Epistaxis R04.0    Fatigue R53.83    Left arm pain M79.602    Chest pain R07.9    NSTEMI (non-ST elevated myocardial infarction) ([facility] I21.4    Asthma J45.909    Paroxysmal atrial fibrillation (HCC) I48.0    Tobacco use Z72.0       Subjective:        Review of systems:    Constitutional: denies fevers, chills, myalgias  Respiratory: denies +SOB, cough  Cardiovascular: + chest pain, palpitations  Gastrointestinal: denies nausea, vomiting, diarrhea      Vital signs/Intake and Output:  Visit Vitals  BP (!) 92/54 (BP 1 Location: Right upper arm, BP Patient Position: Supine)   Pulse (!) 56   Temp 98.1 °F (36.7 °C)   Resp 16   Ht 5' 7\" (1.702 m)   Wt 56.7 kg (125 lb)   SpO2 100%   Breastfeeding No   BMI 19.58 kg/m²     Current Shift:  No intake/output data recorded. Last three shifts:  No intake/output data recorded.     Exam:    General: Well developed, alert, NAD, OX3  Head/Neck: NCAT, supple, No masses, No lymphadenopathy  CVS:Regular rate and rhythm, no M/R/G, S1/S2 heard, no thrill  Lungs:Clear to auscultation bilaterally, no wheezes, rhonchi, or rales  Abdomen: Soft, Nontender, No distention, Normal Bowel sounds, No hepatomegaly  Extremities: No C/C/E, pulses palpable 2+  Skin:normal texture and turgor, no rashes, no lesions  Neuro:grossly normal , follows commands  Psych:appropriate                Labs: Results:       Chemistry Recent Labs     06/13/21  0650 06/12/21  1920   * 109*    142   K 3.8 3.6    106   CO2 28 31   BUN 13 17   CREA 0.59* 0.93   CA 8.6 9.9   AGAP 6 5   BUCR 22* 18   AP 90 113   TP 6.0* 7.6   ALB 3.5 4.2   GLOB 2.5 3.4   AGRAT 1.4 1.2      CBC w/Diff Recent Labs     06/13/21  1515 06/13/21  0650 06/12/21  1920   WBC  --  9.5 7.6   RBC  --  4.38 4.83   HGB 13.0 13.4 14.9   HCT 39.0 40.7 45.3*   PLT  --  251 289   GRANS  --  77* 61   LYMPH  --  15* 31   EOS  --  1 1      Cardiac Enzymes Recent Labs     06/13/21  2159 06/13/21  1414   * 609*   CKND1 7.1* 9.8*      Coagulation Recent Labs     06/13/21  1414 06/13/21  0650 06/12/21  1920   PTP  --   --  13.0   INR  --   --  1.0   APTT 155.6* 57.8* 28.9       Lipid Panel Lab Results   Component Value Date/Time    Cholesterol, total 204 (H) 06/12/2021 07:20 PM    HDL Cholesterol 71 (H) 06/12/2021 07:20 PM    LDL, calculated 100.2 (H) 06/12/2021 07:20 PM    VLDL, calculated 32.8 06/12/2021 07:20 PM    Triglyceride 164 (H) 06/12/2021 07:20 PM    CHOL/HDL Ratio 2.9 06/12/2021 07:20 PM      BNP No results for input(s): BNPP in the last 72 hours.    Liver Enzymes Recent Labs     06/13/21  0650   TP 6.0*   ALB 3.5   AP 90      Thyroid Studies Lab Results   Component Value Date/Time    TSH 2.86 06/12/2021 07:20 PM        Procedures/imaging: see electronic medical records for all procedures/Xrays and details which were not copied into this note but were reviewed prior to creation of Мария Hamilton MD

## 2021-06-14 NOTE — DISCHARGE INSTRUCTIONS
DISCHARGE SUMMARY from Nurse    PATIENT INSTRUCTIONS:    After general anesthesia or intravenous sedation, for 24 hours or while taking prescription Narcotics:  · Limit your activities  · Do not drive and operate hazardous machinery  · Do not make important personal or business decisions  · Do  not drink alcoholic beverages  · If you have not urinated within 8 hours after discharge, please contact your surgeon on call. Report the following to your surgeon:  · Excessive pain, swelling, redness or odor of or around the surgical area  · Temperature over 100.5  · Nausea and vomiting lasting longer than 4 hours or if unable to take medications  · Any signs of decreased circulation or nerve impairment to extremity: change in color, persistent  numbness, tingling, coldness or increase pain  · Any questions    What to do at Home:  Recommended activity: Activity as tolerated,       *  Please give a list of your current medications to your Primary Care Provider. *  Please update this list whenever your medications are discontinued, doses are      changed, or new medications (including over-the-counter products) are added. *  Please carry medication information at all times in case of emergency situations. These are general instructions for a healthy lifestyle:    No smoking/ No tobacco products/ Avoid exposure to second hand smoke  Surgeon General's Warning:  Quitting smoking now greatly reduces serious risk to your health. Obesity, smoking, and sedentary lifestyle greatly increases your risk for illness    A healthy diet, regular physical exercise & weight monitoring are important for maintaining a healthy lifestyle    You may be retaining fluid if you have a history of heart failure or if you experience any of the following symptoms:  Weight gain of 3 pounds or more overnight or 5 pounds in a week, increased swelling in our hands or feet or shortness of breath while lying flat in bed.   Please call your doctor as soon as you notice any of these symptoms; do not wait until your next office visit. The discharge information has been reviewed with the patient. The patient verbalized understanding. Discharge medications reviewed with the patient  Patient Education        Coronary Angiogram: What to Expect at [address] [place] [address]    A coronary angiogram is a test to examine the large blood vessel of your heart (coronary artery). The doctor inserted a thin, flexible tube (catheter) into a blood vessel in your groin. In some cases, the catheter is placed in a blood vessel in the arm. Your groin or arm may have a bruise and feel sore for a day or two after the procedure. You can do light activities around the house but nothing strenuous for several days. This care sheet gives you a general idea about how long it will take for you to recover. But each person recovers at a different pace. Follow the steps below to feel better as quickly as possible. How can you care for yourself at home? Activity    · If the doctor gave you a sedative:  ? For 24 hours, don't do anything that requires attention to detail, such as going to work, making important decisions, or signing any legal documents. It takes time for the medicine's effects to completely wear off.  ? For your safety, do not drive or operate any machinery that could be dangerous. Wait until the medicine wears off and you can think clearly and react easily.     · Do not do strenuous exercise and do not lift, pull, or push anything heavy until your doctor says it is okay. This may be for a day or two. You can walk around the house and do light activity, such as cooking.     · If the catheter was placed in your groin, try not to walk up stairs for the first couple of days.     · If the catheter was placed in your arm near your wrist, do not bend your wrist deeply for the first couple of days.  Be careful using your hand to get into and out of a chair or bed.     · If your doctor recommends it, get more exercise. Walking is a good choice. Bit by bit, increase the amount you walk every day. Try for at least 30 minutes on most days of the week. Diet    · Drink plenty of fluids to help your body flush out the dye. If you have kidney, heart, or liver disease and have to limit fluids, talk with your doctor before you increase the amount of fluids you drink.     · Keep eating a heart-healthy diet that has lots of fruits, vegetables, and whole grains. If you have not been eating this way, talk to your doctor. You also may want to talk to a dietitian. This expert can help you to learn about healthy foods and plan meals. Medicines    · Your doctor will tell you if and when you can restart your medicines. He or she will also give you instructions about taking any new medicines.     · If you take aspirin or some other blood thinner, ask your doctor if and when to start taking it again. Make sure that you understand exactly what your doctor wants you to do.     · Your doctor may prescribe a blood-thinning medicine like aspirin or clopidogrel (Plavix). It is very important that you take these medicines exactly as directed in order to keep the coronary artery open and reduce your risk of a heart attack. Be safe with medicines. Call your doctor if you think you are having a problem with your medicine. Care of the catheter site    · For 1 or 2 days, keep a bandage over the spot where the catheter was inserted. The bandage probably will fall off in this time.     · Put ice or a cold pack on the area for 10 to 20 minutes at a time to help with soreness or swelling. Put a thin cloth between the ice and your skin.     · You may shower 24 to 48 hours after the procedure, if your doctor okays it. Pat the incision dry.     · Do not soak the catheter site until it is healed. Don't take a bath for 1 week, or until your doctor tells you it is okay.     · Watch for bleeding from the site.  A small amount of blood (up to the size of a quarter) on the bandage can be normal.     · If you are bleeding, lie down and press on the area for 15 minutes to try to make it stop. If the bleeding does not stop, call your doctor or seek immediate medical care. Follow-up care is a key part of your treatment and safety. Be sure to make and go to all appointments, and call your doctor if you are having problems. It's also a good idea to know your test results and keep a list of the medicines you take. When should you call for help? Call 911 anytime you think you may need emergency care. For example, call if:    · You passed out (lost consciousness).     · You have severe trouble breathing.     · You have sudden chest pain and shortness of breath, or you cough up blood.     · You have symptoms of a heart attack. These may include:  ? Chest pain or pressure, or a strange feeling in the chest.  ? Sweating. ? Shortness of breath. ? Nausea or vomiting. ? Pain, pressure, or a strange feeling in the back, neck, jaw, or upper belly, or in one or both shoulders or arms. ? Lightheadedness or sudden weakness. ? A fast or irregular heartbeat. After you call 911, the  may tel you to chew 1 adult-strength or 2 to 4 low-dose aspirin. Wait for an ambulance. Do not try to drive yourself.     · You have been diagnosed with angina, and you have symptoms that do not go away with rest or are not getting better within 5 minutes after you take a dose of nitroglycerin. Call your doctor now or seek immediate medical care if:    · You are bleeding from the area where the catheter was put in your artery.     · You have a fast-growing, painful lump at the catheter site.     · You have signs of infection, such as:  ? Increased pain, swelling, warmth, or redness. ? Red streaks leading from the catheter site. ? Pus draining from the catheter site.   ? A fever.     · Your leg, arm, or hand is painful, looks blue, or feels cold, numb, or valeriy. Watch closely for changes in your health, and be sure to contact your doctor if you have any problems. Where can you learn more? Go to http://www.gray.com/  Enter D192 in the search box to learn more about \"Coronary Angiogram: What to Expect at Home. \"  Current as of: August 31, 2020               Content Version: 12.8  © 2006-2021 [employer]. Care instructions adapted under license by [employer] (which disclaims liability or warranty for this information). If you have questions about a medical condition or this instruction, always ask your healthcare professional. [address] any warranty or liability for your use of this information. Patient Education        Learning About Cardiac Rehabilitation  What is it? Cardiac rehabilitation (rehab) is a program for people who have a heart problem. It teaches you how to be more active and make other lifestyle changes. These can lead to a stronger heart and better health. Why is cardiac rehab done? Cardiac rehab can help you get better after being in the hospital for a heart problem or heart surgery. It may help you to:  · Lower your risk of a heart attack or dying from heart disease. · Prevent future heart problems if you're at high risk for heart disease or a heart attack. · Stay out of the hospital.  · Build your strength. This can help increase the amount of activity you can do. · Manage your symptoms. These may include chest pain, shortness of breath, and fatigue. · Manage other health problems. These include high blood pressure and high cholesterol. Other benefits  Rehab may also help you to:  · Go back to work safely and sooner. · Feel more hopeful. You may feel less depressed, stressed, or worried. · Get support from your rehab team and other patients in rehab. · Have more energy and return to your usual activities. · Resume your sex life.   · Have a heart-healthy lifestyle. This includes being active, eating healthy foods, losing weight, and not smoking. What are some types of cardiac rehab? Cardiac rehab programs can be done in an office, a clinic, or your home. These programs usually include:  · Close supervision. This happens during the early part of your exercise program.  · Education and counseling for you and your family. This can help you build healthy habits. These habits will lower your risk of having more heart problems. · Helping you prepare to get back to work and the activities you enjoyed before your heart problems. You may need to adjust your work or leisure activities. · Taking care of your emotional health. You can get help for depression and improve your emotional well-being. · Making a plan to help you start a safe exercise program at home. What does a cardiac rehab team do? In cardiac rehab, you work with a team of health professionals. The team may include a doctor, a nurse specialist, a dietitian, an exercise therapist, and a physical therapist. They design a program just for you, based on your health and goals. They also give you support to help you succeed. What happens before you start cardiac rehab? Before you start cardiac rehab, your doctor will check your heart health to see what types of exercises you can safely do. Tests may include electrocardiograms and echocardiograms. You may also have tests during rehab. They will help your doctor see how you are doing. Where can you learn more? Go to http://www.gray.com/  Enter C375 in the search box to learn more about \"Learning About Cardiac Rehabilitation. \"  Current as of: August 31, 2020               Content Version: 12.8  © 2006-2021 [employer]. Care instructions adapted under license by [employer] (which disclaims liability or warranty for this information).  If you have questions about a medical condition or this instruction, always ask your healthcare professional. [address] any warranty or liability for your use of this information. and appropriate educational materials and side effects teaching were provided.   ___________________________________________________________________________________________________________________________________

## 2021-06-14 NOTE — ROUTINE PROCESS
Assume care of patient from [place], [address] [place] [address]. Patient currently in cath lab. 2008- Patient back on floor from cath lab, BP 82/66. Patient is asymptomatic. Called Dr. Vega Husbands, received orders for 250 fluid bolus and continuous 50 ml/hr infusion. RBV.     2125- BP rechecked at 92/54. Will continue to monitor. 0008- At bedside patient request her PRN bedtime Ativan which is ordered as 25 mg. Patient stated, \"I take 1/2 tab and 1/4 tab and the nurse last night down stairs gave me that dose not what this is. \" Explained to patient that per [facility] the nurse before scanned medication as .25 mg (0.5mg tab1/2 and 1/4) which equaled . 375 mg, although this nurse noted in PTA med list that she is trying to wean off dose so doesn't take entire 0.5mg tab. Called pharmacy to see if medication can be scored this way. Was informed that this particular pill cant accurately be ordered as 0.375 mg in orders. Informed patient, patient stated she will not take medication and stated, \"I'll just take my own in my purse. \" Informed patient that she needs to have these medications taken to pharmacy as she should not take any medications outside of hospital while admitted as they can interact with other medications. Patient did not verbalize understanding and stated she will take what she has already. Called security and informed Dr. Mayer Seen. 80- Security at bedside. Medications placed in secured medication bag and taken to pharmacy. Called to have MD speak with patient as she continues to state that nurse last night gave her 1/4 and 1/2 tab. MD at bedside and stated she will order dose of 0.375 and dose needs to be scored and 1/4 wasted with 2nd RN. At bedside with dose, patient now refused medication. Will continue to monitor. 071- Bedside and Verbal shift change report given to LISSETTE Bejarano (oncoming nurse) by Katerina Colvin RN (offgoing nurse).  Report included the following information SBAR, Kardex, Intake/Output, MAR and Recent Results. [facility] Night Shift Chart Audit    Chart Audit completed?  YES

## 2021-06-14 NOTE — PROGRESS NOTES
Discharge Planning: Home with family assist and MD follow-up    CM spoke with the the patient at the bedside regarding plans for discharge. The patient states that she drove herself to the hospital and that she would like to drive herself home upon discharge. The the patient states that she lives with her daughter and that her daughter is available to help her as needed. The patient states that she would like to schedule her own follow-up visits as she works from home and has lots of meetings during the day. The patient denies any additional questions or concerns at this time. CM to follow the patient's progress and be available to assist with discharge planning as needed. CMS referral placed. Care Management Interventions  PCP Verified by CM:  Yes  Last Visit to PCP: 01/01/21  Mode of Transport at Discharge: Self  Current Support Network: Own Home (Daughter lives with pt)  Confirm Follow Up Transport: Family  The Plan for Transition of Care is Related to the Following Treatment Goals : NSTEMI with CAD, Hyperlipidemia, Asthma  Discharge Location  Discharge Placement: Home with family assistance

## 2021-06-14 NOTE — PROGRESS NOTES
Hospitalist Progress Note    Patient: Jessica Pimentel MRN: [id number]  CSN: [id number]    YOB: 1960  Age: 64 y.o. Sex: female    DOA: 6/12/2021 LOS:  LOS: 2 days          Chief Complaint:  Hx of CAD, hyperlip, tobaco disorder  Came for the second time to ER  Yesterday stayed in parking lot  Today came in to ER  Found to have increase in Trop  Now post stenting and doing well  Issue with ativan and water   Want to d/c today     Security called for issues with med   Not clear in chart         Assessment/Plan     1. NSTEMi  Post stenting  Was only on half a tablet of crestor goal LDL under 70  Now on Brilenta    2. Occ Tob Use  Advised to quit    3. Anxiety  Wean off ativan as possible    4. Hyperlip  Cont crestor full dose  With vitamin D maybe coenzyme q    5. CODS  Continue Pulmicort  And Brovana    6. Contact Derm for LEADS  Steroids cream     Patient Active Problem List   Diagnosis Code    GERD (gastroesophageal reflux disease) K21.9    Depression F32.9    Coronary atherosclerosis of native coronary artery I25.10    Hyperlipidemia E78.5    Chest pain, unspecified R07.9    Epistaxis R04.0    Fatigue R53.83    Left arm pain M79.602    Chest pain R07.9    NSTEMI (non-ST elevated myocardial infarction) (HCC) I21.4    Asthma J45.909    Paroxysmal atrial fibrillation (HCC) I48.0    Tobacco use Z72.0       Subjective:        Review of systems:    Constitutional: denies fevers, chills, myalgias  Respiratory: denies +SOB, cough  Cardiovascular: + chest pain, palpitations  Gastrointestinal: denies nausea, vomiting, diarrhea      Vital signs/Intake and Output:  Visit Vitals  BP (!) 103/53 (BP 1 Location: Right upper arm, BP Patient Position: Supine)   Pulse (!) 52   Temp 98.4 °F (36.9 °C)   Resp 16   Ht 5' 7\" (1.702 m)   Wt 56.7 kg (125 lb)   SpO2 100%   Breastfeeding No   BMI 19.58 kg/m²     Current Shift:  No intake/output data recorded.   Last three shifts:  06/12 1901 - 06/14 0700  In: 500 [I.V.:500]  Out: -     Exam:    General: Well developed, alert, NAD, OX3  Head/Neck: NCAT, supple, No masses, No lymphadenopathy  CVS:Regular rate and rhythm, no M/R/G, S1/S2 heard, no thrill  Lungs:Clear to auscultation bilaterally, no wheezes, rhonchi, or rales  Abdomen: Soft, Nontender, No distention, Normal Bowel sounds, No hepatomegaly  Extremities: No C/C/E, pulses palpable 2+  Skin:normal texture and turgor, no rashes, no lesions  Neuro:grossly normal , follows commands  Psych:appropriate                Labs: Results:       Chemistry Recent Labs     06/14/21  0555 06/13/21  0650 06/12/21 1920   GLU 88 106* 109*    142 142   K 4.2 3.8 3.6   * 108 106   CO2 27 28 31   BUN 13 13 17   CREA 0.63 0.59* 0.93   CA 8.3* 8.6 9.9   AGAP 5 6 5   BUCR 21* 22* 18   AP 82 90 113   TP 5.4* 6.0* 7.6   ALB 3.1* 3.5 4.2   GLOB 2.3 2.5 3.4   AGRAT 1.3 1.4 1.2      CBC w/Diff Recent Labs     06/13/21  1515 06/13/21  0650 06/12/21 1920   WBC  --  9.5 7.6   RBC  --  4.38 4.83   HGB 13.0 13.4 14.9   HCT 39.0 40.7 45.3*   PLT  --  251 289   GRANS  --  77* 61   LYMPH  --  15* 31   EOS  --  1 1      Cardiac Enzymes Recent Labs     06/13/21  2159 06/13/21  1414   * 609*   CKND1 7.1* 9.8*      Coagulation Recent Labs     06/13/21  1414 06/13/21  0650 06/12/21 1920   PTP  --   --  13.0   INR  --   --  1.0   APTT 155.6* 57.8* 28.9       Lipid Panel Lab Results   Component Value Date/Time    Cholesterol, total 204 (H) 06/12/2021 07:20 PM    HDL Cholesterol 71 (H) 06/12/2021 07:20 PM    LDL, calculated 100.2 (H) 06/12/2021 07:20 PM    VLDL, calculated 32.8 06/12/2021 07:20 PM    Triglyceride 164 (H) 06/12/2021 07:20 PM    CHOL/HDL Ratio 2.9 06/12/2021 07:20 PM      BNP No results for input(s): BNPP in the last 72 hours.    Liver Enzymes Recent Labs     06/14/21  0555   TP 5.4*   ALB 3.1*   AP 82      Thyroid Studies Lab Results   Component Value Date/Time    TSH 2.86 06/12/2021 07:20 PM        Procedures/imaging: see electronic medical records for all procedures/Xrays and details which were not copied into this note but were reviewed prior to creation of Sanford Hammans, MD

## 2021-06-15 LAB
ATRIAL RATE: 78 BPM
CALCULATED P AXIS, ECG09: 73 DEGREES
CALCULATED R AXIS, ECG10: 71 DEGREES
CALCULATED T AXIS, ECG11: 69 DEGREES
DIAGNOSIS, 93000: NORMAL
P-R INTERVAL, ECG05: 146 MS
Q-T INTERVAL, ECG07: 360 MS
QRS DURATION, ECG06: 88 MS
QTC CALCULATION (BEZET), ECG08: 410 MS
VENTRICULAR RATE, ECG03: 78 BPM

## 2021-07-23 ENCOUNTER — APPOINTMENT (OUTPATIENT)
Dept: GENERAL RADIOLOGY | Age: 61
End: 2021-07-23
Attending: EMERGENCY MEDICINE
Payer: COMMERCIAL

## 2021-07-23 ENCOUNTER — HOSPITAL ENCOUNTER (EMERGENCY)
Age: 61
Discharge: HOME | End: 2021-07-23
Attending: EMERGENCY MEDICINE
Payer: COMMERCIAL

## 2021-07-23 VITALS
OXYGEN SATURATION: 99 % | BODY MASS INDEX: 17.23 KG/M2 | RESPIRATION RATE: 18 BRPM | DIASTOLIC BLOOD PRESSURE: 78 MMHG | WEIGHT: 110 LBS | HEART RATE: 52 BPM | TEMPERATURE: 98 F | SYSTOLIC BLOOD PRESSURE: 127 MMHG

## 2021-07-23 DIAGNOSIS — R53.83 FATIGUE, UNSPECIFIED TYPE: Primary | ICD-10-CM

## 2021-07-23 DIAGNOSIS — R51.9 NONINTRACTABLE EPISODIC HEADACHE, UNSPECIFIED HEADACHE TYPE: ICD-10-CM

## 2021-07-23 DIAGNOSIS — R09.89 LABILE BLOOD PRESSURE: ICD-10-CM

## 2021-07-23 LAB
ALBUMIN SERPL-MCNC: 4.1 G/DL (ref 3.4–5)
ALBUMIN/GLOB SERPL: 1.4 {RATIO} (ref 0.8–1.7)
ALP SERPL-CCNC: 100 U/L (ref 45–117)
ALT SERPL-CCNC: 39 U/L (ref 13–56)
ANION GAP SERPL CALC-SCNC: 2 MMOL/L (ref 3–18)
AST SERPL-CCNC: 29 U/L (ref 10–38)
ATRIAL RATE: 57 BPM
BASOPHILS # BLD: 0 K/UL (ref 0–0.1)
BASOPHILS NFR BLD: 1 % (ref 0–2)
BILIRUB SERPL-MCNC: 0.3 MG/DL (ref 0.2–1)
BNP SERPL-MCNC: 386 PG/ML (ref 0–900)
BUN SERPL-MCNC: 17 MG/DL (ref 7–18)
BUN/CREAT SERPL: 20 (ref 12–20)
CALCIUM SERPL-MCNC: 9.6 MG/DL (ref 8.5–10.1)
CALCULATED P AXIS, ECG09: 65 DEGREES
CALCULATED R AXIS, ECG10: 65 DEGREES
CALCULATED T AXIS, ECG11: 67 DEGREES
CHLORIDE SERPL-SCNC: 108 MMOL/L (ref 100–111)
CK MB CFR SERPL CALC: 1.8 % (ref 0–4)
CK MB SERPL-MCNC: 4.8 NG/ML (ref 5–25)
CK SERPL-CCNC: 263 U/L (ref 26–192)
CO2 SERPL-SCNC: 30 MMOL/L (ref 21–32)
CREAT SERPL-MCNC: 0.85 MG/DL (ref 0.6–1.3)
DIAGNOSIS, 93000: NORMAL
DIFFERENTIAL METHOD BLD: NORMAL
EOSINOPHIL # BLD: 0.1 K/UL (ref 0–0.4)
EOSINOPHIL NFR BLD: 1 % (ref 0–5)
ERYTHROCYTE [DISTWIDTH] IN BLOOD BY AUTOMATED COUNT: 13.5 % (ref 11.6–14.5)
GLOBULIN SER CALC-MCNC: 2.9 G/DL (ref 2–4)
GLUCOSE SERPL-MCNC: 95 MG/DL (ref 74–99)
HCT VFR BLD AUTO: 42.6 % (ref 35–45)
HGB BLD-MCNC: 13.5 G/DL (ref 12–16)
LYMPHOCYTES # BLD: 1.9 K/UL (ref 0.9–3.6)
LYMPHOCYTES NFR BLD: 29 % (ref 21–52)
MCH RBC QN AUTO: 29.9 PG (ref 24–34)
MCHC RBC AUTO-ENTMCNC: 31.7 G/DL (ref 31–37)
MCV RBC AUTO: 94.5 FL (ref 74–97)
MONOCYTES # BLD: 0.5 K/UL (ref 0.05–1.2)
MONOCYTES NFR BLD: 8 % (ref 3–10)
NEUTS SEG # BLD: 4 K/UL (ref 1.8–8)
NEUTS SEG NFR BLD: 61 % (ref 40–73)
P-R INTERVAL, ECG05: 156 MS
PLATELET # BLD AUTO: 243 K/UL (ref 135–420)
PMV BLD AUTO: 10.6 FL (ref 9.2–11.8)
POTASSIUM SERPL-SCNC: 3.7 MMOL/L (ref 3.5–5.5)
PROT SERPL-MCNC: 7 G/DL (ref 6.4–8.2)
Q-T INTERVAL, ECG07: 420 MS
QRS DURATION, ECG06: 88 MS
QTC CALCULATION (BEZET), ECG08: 408 MS
RBC # BLD AUTO: 4.51 M/UL (ref 4.2–5.3)
SODIUM SERPL-SCNC: 140 MMOL/L (ref 136–145)
TROPONIN I SERPL-MCNC: <0.02 NG/ML (ref 0–0.04)
VENTRICULAR RATE, ECG03: 57 BPM
WBC # BLD AUTO: 6.5 K/UL (ref 4.6–13.2)

## 2021-07-23 PROCEDURE — 80053 COMPREHEN METABOLIC PANEL: CPT

## 2021-07-23 PROCEDURE — 93005 ELECTROCARDIOGRAM TRACING: CPT

## 2021-07-23 PROCEDURE — 85025 COMPLETE CBC W/AUTO DIFF WBC: CPT

## 2021-07-23 PROCEDURE — 83880 ASSAY OF NATRIURETIC PEPTIDE: CPT

## 2021-07-23 PROCEDURE — 82553 CREATINE MB FRACTION: CPT

## 2021-07-23 PROCEDURE — 99285 EMERGENCY DEPT VISIT HI MDM: CPT

## 2021-07-23 NOTE — ED PROVIDER NOTES
EMERGENCY DEPARTMENT HISTORY AND PHYSICAL EXAM    1:35 AM    Date: 7/23/2021  Patient Name: Rosibel Costello    History of Presenting Illness     Chief Complaint   Patient presents with    Elevated Blood Pressure       History Provided By: Patient  Location/Duration/Severity/Modifying factors   Patient is a 61-year-old female with a history of coronary artery disease, status post coronary stenting in June 2021 presenting to the emergency department with multiple complaints. She describes it really since she was discharged in the hospital she has been feeling unwell. She reports that she has been continuing to lose weight over that time, she is not able to quantify it for me at this time. She tells me that at her hospital admission she told them that she weighed 125 pounds, although she did not weigh herself. Today her weight is 110 pounds. She feels that her veins are more prominent in her forearms. She is not been having any chest pain. She complains of occasional shortness of breath which she attributes to the Bril[place]. She believes some of her symptoms may be secondary to the [address]. She also reports that when she was discharged from the hospital she was taken off of her atenolol which she had been on for a long time. She believes this could be some of her issues. She also concerned about her blood pressure. Reports that it typically running low between 90 and 110 although recently it started to increase between 150 and 170, particularly tonight. She has a log dating back to 2013 with her and many of her blood pressures are running in the 31-94 systolic range previously while she was on the atenolol. Heart rates typically running between 60 and 80 at home.   The patient reports that she has been having palpitations at times particularly after she goes out and works out in the yard will come home and feels like her heart rate is too fast.  She complains of occasional lightheadedness as well as a headache which she believes is related to the blood pressure. No fevers or chills. Not having any vomiting or diarrhea. She reports that while she was in the hospital she was taken off her Lasix and her atenolol as well as discontinued her Clarinex. PCP: Eileen Cheadle, MD    Current Outpatient Medications   Medication Sig Dispense Refill    rosuvastatin (Crestor) 40 mg tablet Take 1 Tablet by mouth nightly. 30 Tablet 0    ticagrelor (BRILINTA) 90 mg tablet Take 1 Tablet by mouth every twelve (12) hours every twelve (12) hours. 60 Tablet 0    hydrocortisone (HYTONE) 2.5 % topical cream Apply  to affected area three (3) times daily. use thin layer 30 g 0    famotidine (PEPCID) 20 mg tablet Take 20 mg by mouth daily.  cyanocobalamin 1,000 mcg tablet Take 1,000 mcg by mouth daily.  nitroglycerin (NITROSTAT) 0.4 mg SL tablet 1 tablet by SubLINGual route every five (5) minutes as needed for Chest Pain (total of 3 tabs for one episode of pain). 75 tablet 3    montelukast (SINGULAIR) 10 mg tablet Take 10 mg by mouth daily.  LORazepam (ATIVAN) 0.5 mg tablet Take 0.25 mg by mouth nightly. Pt states she is weaning herself off;  Pt takes 1/2 and 1/4 tablet  Indications: anxious      potassium chloride SR (KLOR-CON 10) 10 mEq tablet Take 10 mEq by mouth daily.  aspirin 81 mg tablet Take 81 mg by mouth daily.          Past History     Past Medical History:  Past Medical History:   Diagnosis Date    Asthma     CAD (coronary artery disease)     Chest pain, unspecified 9/9/2012    Coronary atherosclerosis of native coronary artery     Depression     Epistaxis 12/11/2012    Fatigue 12/11/2012    GERD (gastroesophageal reflux disease)     Ill-defined condition     allergies    Ill-defined condition     anal mole and cyst    Left arm pain 8/7/2014    Nausea & vomiting     Other and unspecified hyperlipidemia 6/1/2011    Other and unspecified hyperlipidemia     Ovarian cyst Past Surgical History:  Past Surgical History:   Procedure Laterality Date    HX APPENDECTOMY      HX BREAST BIOPSY      right cyst    HX CHOLECYSTECTOMY      HX CORONARY STENT PLACEMENT  05/05/11    stent mid right coronary artery stenosis    HX GYN      dysplasia    HX HEART CATHETERIZATION  05/11    stent placement mid coronary artery    HX HYSTERECTOMY      HX KNEE ARTHROSCOPY      left    HX OOPHORECTOMY      HX TONSILLECTOMY         Family History:  Family History   Problem Relation Age of Onset    Coronary Artery Disease Mother 46    Heart Attack Mother 46       Social History:  Social History     Tobacco Use    Smoking status: Current Some Day Smoker     Packs/day: 0.80     Years: 30.00     Pack years: 24.00     Types: Cigarettes     Last attempt to quit: 5/9/2011     Years since quitting: 10.2    Smokeless tobacco: Never Used   Substance Use Topics    Alcohol use: No     Comment: ross yearly    Drug use: No       Allergies: Allergies   Allergen Reactions    Zocor [Simvastatin] Hives    Biaxin [Clarithromycin] Other (comments)     [place]ps      Ciprofloxacin Rash    Pcn [Penicillins] Rash    Plavix [Clopidogrel] Rash    Wellbutrin [Bupropion Hcl] Other (comments)     [place]ps           I reviewed and confirmed the above information with patient and updated as necessary. Review of Systems     Review of Systems   Constitutional: Positive for fatigue. Negative for chills and fever. HENT: Negative for congestion, rhinorrhea, sinus pressure and sneezing. Eyes: Negative for visual disturbance. Respiratory: Negative for cough, shortness of breath and wheezing. Cardiovascular: Negative for chest pain and leg swelling. Gastrointestinal: Negative for abdominal pain, diarrhea, nausea and vomiting. Genitourinary: Negative for dysuria, frequency, urgency, vaginal bleeding and vaginal discharge. Musculoskeletal: Negative for back pain and neck pain.    Skin: Negative for rash.   Neurological: Positive for light-headedness and headaches. Negative for syncope and numbness. Physical Exam     Visit Vitals  /78   Pulse (!) 52   Temp 98 °F (36.7 °C)   Resp 18   Wt 49.9 kg (110 lb)   SpO2 99%   BMI 17.23 kg/m²       Physical Exam  Vitals and nursing note reviewed. Constitutional:       General: She is not in acute distress. Appearance: Normal appearance. She is normal weight. HENT:      Head: Normocephalic and atraumatic. Right Ear: External ear normal.      Left Ear: External ear normal.      Nose: Nose normal.      Mouth/Throat:      Mouth: Mucous membranes are moist.   Eyes:      Conjunctiva/sclera: Conjunctivae normal.      Pupils: Pupils are equal, round, and reactive to light. Cardiovascular:      Rate and Rhythm: Normal rate and regular rhythm. Pulses: Normal pulses. Heart sounds: Normal heart sounds. No murmur heard. Pulmonary:      Effort: Pulmonary effort is normal.      Breath sounds: Normal breath sounds. No wheezing, rhonchi or rales. Abdominal:      General: Abdomen is flat. Tenderness: There is no abdominal tenderness. There is no guarding or rebound. Musculoskeletal:         General: No swelling or tenderness. Normal range of motion. Cervical back: Normal range of motion and neck supple. Right lower leg: No edema. Left lower leg: No edema. Skin:     General: Skin is warm and dry. Capillary Refill: Capillary refill takes less than 2 seconds. Findings: No rash. Neurological:      General: No focal deficit present. Mental Status: She is alert and oriented to person, place, and time. Cranial Nerves: No cranial nerve deficit. Motor: No weakness. Comments: Moves all extremities, no focal deficits.          Diagnostic Study Results     Labs -  Recent Results (from the past 24 hour(s))   CBC WITH AUTOMATED DIFF    Collection Time: 07/23/21 12:55 AM   Result Value Ref Range    WBC 6.5 4.6 - 13.2 K/uL    RBC 4.51 4.20 - 5.30 M/uL    HGB 13.5 12.0 - 16.0 g/dL    HCT 42.6 35.0 - 45.0 %    MCV 94.5 74.0 - 97.0 FL    MCH 29.9 24.0 - 34.0 PG    MCHC 31.7 31.0 - 37.0 g/dL    RDW 13.5 11.6 - 14.5 %    PLATELET 474 801 - 260 K/uL    MPV 10.6 9.2 - 11.8 FL    NEUTROPHILS 61 40 - 73 %    LYMPHOCYTES 29 21 - 52 %    MONOCYTES 8 3 - 10 %    EOSINOPHILS 1 0 - 5 %    BASOPHILS 1 0 - 2 %    ABS. NEUTROPHILS 4.0 1.8 - 8.0 K/UL    ABS. LYMPHOCYTES 1.9 0.9 - 3.6 K/UL    ABS. MONOCYTES 0.5 0.05 - 1.2 K/UL    ABS. EOSINOPHILS 0.1 0.0 - 0.4 K/UL    ABS. BASOPHILS 0.0 0.0 - 0.1 K/UL    DF AUTOMATED     CARDIAC PANEL,(CK, CKMB & TROPONIN)    Collection Time: 07/23/21 12:55 AM   Result Value Ref Range    CK - MB 4.8 (H) <3.6 ng/ml    CK-MB Index 1.8 0.0 - 4.0 %     (H) 26 - 192 U/L    Troponin-I, QT <0.02 0.0 - 9.340 NG/ML   METABOLIC PANEL, COMPREHENSIVE    Collection Time: 07/23/21 12:55 AM   Result Value Ref Range    Sodium 140 136 - 145 mmol/L    Potassium 3.7 3.5 - 5.5 mmol/L    Chloride 108 100 - 111 mmol/L    CO2 30 21 - 32 mmol/L    Anion gap 2 (L) 3.0 - 18 mmol/L    Glucose 95 74 - 99 mg/dL    BUN 17 7.0 - 18 MG/DL    Creatinine 0.85 0.6 - 1.3 MG/DL    BUN/Creatinine ratio 20 12 - 20      GFR est AA >60 >60 ml/min/1.73m2    GFR est non-AA >60 >60 ml/min/1.73m2    Calcium 9.6 8.5 - 10.1 MG/DL    Bilirubin, total 0.3 0.2 - 1.0 MG/DL    ALT (SGPT) 39 13 - 56 U/L    AST (SGOT) 29 10 - 38 U/L    Alk. phosphatase 100 45 - 117 U/L    Protein, total 7.0 6.4 - 8.2 g/dL    Albumin 4.1 3.4 - 5.0 g/dL    Globulin 2.9 2.0 - 4.0 g/dL    A-G Ratio 1.4 0.8 - 1.7     NT-PRO BNP    Collection Time: 07/23/21 12:55 AM   Result Value Ref Range    NT pro- 0 - 900 PG/ML         Radiologic Studies -   No orders to display           Medical Decision Making   I am the first provider for this patient.     I reviewed the vital signs, available nursing notes, past medical history, past surgical history, family history and social history. Vital Signs-Reviewed the patient's vital signs. EKG: See ED course for my interpretation of EKG(s). Records Reviewed: Nursing Notes, Old Medical Records, Previous Radiology Studies and Previous Laboratory Studies (Time of Review: 1:35 AM)    ED Course: Progress Notes, Reevaluation, and Consults:  ED Course as of Jul 23 0438 Fri Jul 23, 2021   0218 Notified by nursing staff the patient has refused her x-ray. [DAMION]   0042 EKG interpretation of July 23, 2021, 0058. Sinus bradycardia rate of 57 bpm, normal axis, normal intervals. Small Q waves in leads II, III and aVF. No ST elevation or depression. No T wave inversions. Overall sinus bradycardia without any acute changes. [DAMION]   0213 Discussed the results with the patient. Advised that typically we would do a repeat troponin but she declines at this time. She wants to be discharged she feels reassured by the results. Recommend she follow-up with her PCP as well as cardiology. Advised her that they may want to restart her atenolol at that time versus continue holding it given her blood pressures do seem to go up and down over the course of time. In any case to have follow-up for those instructions and return if she is feeling worse. [DAMION]      ED Course User Index  [DAMION] Eleanor Hassan DO         Provider Notes (Medical Decision Making):   MDM  Number of Diagnoses or Management Options  Fatigue, unspecified type  Labile blood pressure  Nonintractable episodic headache, unspecified headache type  Diagnosis management comments: 61-year-old female with history of coronary artery disease presenting to the ED with complaints of concerns about her blood pressure as well as her symptoms which are fairly nonspecific, frontal headache, generalized weakness, fatigue and concerns about her blood pressure being elevated at home. She was having a headache which is why she checked her blood pressure and found it was around 150s at home.   Her initial blood pressure here was 170/68. She describes that her heart rate seems to speed up after completing activity. She is not having any symptoms that I would consider to be anginal equivalents. I doubt this represents acute coronary syndrome, low likelihood of PE, patient's headache seems to be fairly benign, does not have any signs of hypertensive emergency, intracranial hemorrhage, ischemic stroke etc.    Results reviewed: Her cardiac panel is essentially negative. Her remaining work-up is overall unremarkable. Patient's blood pressures have been somewhat labile here, slightly elevated and slightly on the low side patient reports that she typically has lower blood pressures. I advised the patient I am reluctant to start her on any medication for her blood pressure at this time and that she should follow-up with her PCP and cardiology. Suggested to her we repeat troponin to ensure is not increasing over time she declines requesting to go home instead, this is reasonable given patient has not had any chest pain. Advised to follow-up with PCP and cardiologist and return in the meantime. At this time, patient is stable and appropriate for discharge home.  Patient demonstrates understanding of current diagnoses and is in agreement with the treatment plan. Trace Rolando are advised that while the likelihood of serious underlying condition is low at this point given the evaluation performed today, we cannot fully rule it out. Trace Rolando are advised to immediately return with any new symptoms or worsening of current condition.  All questions have been answered. Elías Finn is given educational material regarding their diagnoses, including danger symptoms and when to return to the ED. This note was dictated utilizing Dragon voice recognition software. Unfortunately this leads to occasional typographical errors. I apologize in advance if the situation occurs.  If questions occur please do not hesitate to contact me directly. Guillermo Cowan,           Procedures    Critical Care Time: N/a    Diagnosis     Clinical Impression:   1. Fatigue, unspecified type    2. Nonintractable episodic headache, unspecified headache type    3. Labile blood pressure        Disposition: Discharge    Follow-up Information     Follow up With Specialties Details Why Contact Info    Shaila Love MD Family Medicine In 2 days  [address] Mark Varner MD Cardiology In 2 days  [address]  [place] [address] [place]   [phone]      [facility] EMERGENCY DEPT Emergency Medicine  As needed, If symptoms worsen; or [address]  [phone]           Discharge Medication List as of 7/23/2021  3:01 AM      CONTINUE these medications which have NOT CHANGED    Details   rosuvastatin (Crestor) 40 mg tablet Take 1 Tablet by mouth nightly., Normal, Disp-30 Tablet, R-0      ticagrelor (BRILINTA) 90 mg tablet Take 1 Tablet by mouth every twelve (12) hours every twelve (12) hours. , Normal, Disp-60 Tablet, R-0      hydrocortisone (HYTONE) 2.5 % topical cream Apply  to affected area three (3) times daily. use thin layer, Normal, Disp-30 g, R-0      famotidine (PEPCID) 20 mg tablet Take 20 mg by mouth daily. , Historical Med      cyanocobalamin 1,000 mcg tablet Take 1,000 mcg by mouth daily. , Historical Med      nitroglycerin (NITROSTAT) 0.4 mg SL tablet 1 tablet by SubLINGual route every five (5) minutes as needed for Chest Pain (total of 3 tabs for one episode of pain). , Normal, Disp-75 tablet, R-3      montelukast (SINGULAIR) 10 mg tablet Take 10 mg by mouth daily. , Historical Med      LORazepam (ATIVAN) 0.5 mg tablet Take 0.25 mg by mouth nightly. Pt states she is weaning herself off;  Pt takes 1/2 and 1/4 tablet  Indications: anxious, Historical Med      potassium chloride SR (KLOR-CON 10) 10 mEq tablet Take 10 mEq by mouth daily. , Historical Med      aspirin 81 mg tablet Take 81 mg by mouth daily. Historical Med, 81 mg             Wiliam Beth DO   Emergency Medicine   July 23, 2021, 1:35 AM     This note is dictated utilizing Dragon voice recognition software. Unfortunately this leads to occasional typographical errors using the voice recognition. I apologize in advance if the situation occurs. If questions occur please do not hesitate to contact me directly.     Wiliam Beth, DO

## 2021-07-23 NOTE — ED TRIAGE NOTES
Arrives ambulatory to the ed with concerns regarding blood pressure. Pt had a cardiac stent placed at this facility last month.

## 2021-12-30 ENCOUNTER — TRANSCRIBE ORDER (OUTPATIENT)
Dept: SCHEDULING | Age: 61
End: 2021-12-30

## 2021-12-30 DIAGNOSIS — R76.8 ABNORMAL ANCA TEST: Primary | ICD-10-CM

## 2022-11-27 ENCOUNTER — HOSPITAL ENCOUNTER (INPATIENT)
Age: 62
LOS: 1 days | Discharge: LEFT BEFORE DISCHARGE | DRG: 313 | End: 2022-11-27
Attending: EMERGENCY MEDICINE | Admitting: INTERNAL MEDICINE
Payer: COMMERCIAL

## 2022-11-27 ENCOUNTER — APPOINTMENT (OUTPATIENT)
Dept: GENERAL RADIOLOGY | Age: 62
DRG: 313 | End: 2022-11-27
Attending: EMERGENCY MEDICINE
Payer: COMMERCIAL

## 2022-11-27 VITALS
RESPIRATION RATE: 19 BRPM | BODY MASS INDEX: 19.29 KG/M2 | HEIGHT: 66 IN | HEART RATE: 68 BPM | OXYGEN SATURATION: 99 % | WEIGHT: 120 LBS | DIASTOLIC BLOOD PRESSURE: 63 MMHG | SYSTOLIC BLOOD PRESSURE: 118 MMHG | TEMPERATURE: 96.8 F

## 2022-11-27 DIAGNOSIS — R07.9 CHEST PAIN WITH HIGH RISK FOR CARDIAC ETIOLOGY: Primary | ICD-10-CM

## 2022-11-27 LAB
ALBUMIN SERPL-MCNC: 3.7 G/DL (ref 3.4–5)
ALBUMIN/GLOB SERPL: 1.3 {RATIO} (ref 0.8–1.7)
ALP SERPL-CCNC: 87 U/L (ref 45–117)
ALT SERPL-CCNC: 32 U/L (ref 13–56)
ANION GAP SERPL CALC-SCNC: 4 MMOL/L (ref 3–18)
AST SERPL-CCNC: 25 U/L (ref 10–38)
ATRIAL RATE: 66 BPM
BASOPHILS # BLD: 0 K/UL (ref 0–0.1)
BASOPHILS NFR BLD: 1 % (ref 0–2)
BILIRUB SERPL-MCNC: 0.3 MG/DL (ref 0.2–1)
BNP SERPL-MCNC: 225 PG/ML (ref 0–900)
BUN SERPL-MCNC: 18 MG/DL (ref 7–18)
BUN/CREAT SERPL: 20 (ref 12–20)
CALCIUM SERPL-MCNC: 9.4 MG/DL (ref 8.5–10.1)
CALCULATED P AXIS, ECG09: 67 DEGREES
CALCULATED R AXIS, ECG10: 72 DEGREES
CALCULATED T AXIS, ECG11: 70 DEGREES
CHLORIDE SERPL-SCNC: 107 MMOL/L (ref 100–111)
CO2 SERPL-SCNC: 29 MMOL/L (ref 21–32)
CREAT SERPL-MCNC: 0.9 MG/DL (ref 0.6–1.3)
D DIMER PPP FEU-MCNC: 0.37 UG/ML(FEU)
DIAGNOSIS, 93000: NORMAL
DIFFERENTIAL METHOD BLD: NORMAL
EOSINOPHIL # BLD: 0.1 K/UL (ref 0–0.4)
EOSINOPHIL NFR BLD: 1 % (ref 0–5)
ERYTHROCYTE [DISTWIDTH] IN BLOOD BY AUTOMATED COUNT: 13.5 % (ref 11.6–14.5)
GLOBULIN SER CALC-MCNC: 2.9 G/DL (ref 2–4)
GLUCOSE SERPL-MCNC: 127 MG/DL (ref 74–99)
HCT VFR BLD AUTO: 41.4 % (ref 35–45)
HGB BLD-MCNC: 13.7 G/DL (ref 12–16)
IMM GRANULOCYTES # BLD AUTO: 0 K/UL (ref 0–0.04)
IMM GRANULOCYTES NFR BLD AUTO: 0 % (ref 0–0.5)
LYMPHOCYTES # BLD: 1.5 K/UL (ref 0.9–3.6)
LYMPHOCYTES NFR BLD: 26 % (ref 21–52)
MAGNESIUM SERPL-MCNC: 2.2 MG/DL (ref 1.6–2.6)
MCH RBC QN AUTO: 30.8 PG (ref 24–34)
MCHC RBC AUTO-ENTMCNC: 33.1 G/DL (ref 31–37)
MCV RBC AUTO: 93 FL (ref 78–100)
MONOCYTES # BLD: 0.6 K/UL (ref 0.05–1.2)
MONOCYTES NFR BLD: 9 % (ref 3–10)
NEUTS SEG # BLD: 3.8 K/UL (ref 1.8–8)
NEUTS SEG NFR BLD: 63 % (ref 40–73)
NRBC # BLD: 0 K/UL (ref 0–0.01)
NRBC BLD-RTO: 0 PER 100 WBC
P-R INTERVAL, ECG05: 152 MS
PLATELET # BLD AUTO: 238 K/UL (ref 135–420)
PMV BLD AUTO: 10.3 FL (ref 9.2–11.8)
POTASSIUM SERPL-SCNC: 4.3 MMOL/L (ref 3.5–5.5)
PROT SERPL-MCNC: 6.6 G/DL (ref 6.4–8.2)
Q-T INTERVAL, ECG07: 380 MS
QRS DURATION, ECG06: 80 MS
QTC CALCULATION (BEZET), ECG08: 398 MS
RBC # BLD AUTO: 4.45 M/UL (ref 4.2–5.3)
SODIUM SERPL-SCNC: 140 MMOL/L (ref 136–145)
TROPONIN-HIGH SENSITIVITY: 9 NG/L (ref 0–54)
VENTRICULAR RATE, ECG03: 66 BPM
WBC # BLD AUTO: 6 K/UL (ref 4.6–13.2)

## 2022-11-27 PROCEDURE — 83880 ASSAY OF NATRIURETIC PEPTIDE: CPT

## 2022-11-27 PROCEDURE — 99285 EMERGENCY DEPT VISIT HI MDM: CPT

## 2022-11-27 PROCEDURE — 80053 COMPREHEN METABOLIC PANEL: CPT

## 2022-11-27 PROCEDURE — 65270000029 HC RM PRIVATE

## 2022-11-27 PROCEDURE — 85025 COMPLETE CBC W/AUTO DIFF WBC: CPT

## 2022-11-27 PROCEDURE — 71045 X-RAY EXAM CHEST 1 VIEW: CPT

## 2022-11-27 PROCEDURE — 83735 ASSAY OF MAGNESIUM: CPT

## 2022-11-27 PROCEDURE — 93005 ELECTROCARDIOGRAM TRACING: CPT

## 2022-11-27 PROCEDURE — 84484 ASSAY OF TROPONIN QUANT: CPT

## 2022-11-27 PROCEDURE — 85379 FIBRIN DEGRADATION QUANT: CPT

## 2022-11-27 RX ORDER — ACETAMINOPHEN 325 MG/1
650 TABLET ORAL
Status: DISCONTINUED | OUTPATIENT
Start: 2022-11-27 | End: 2022-11-28 | Stop reason: HOSPADM

## 2022-11-27 RX ORDER — SODIUM CHLORIDE 0.9 % (FLUSH) 0.9 %
5-40 SYRINGE (ML) INJECTION EVERY 8 HOURS
Status: DISCONTINUED | OUTPATIENT
Start: 2022-11-27 | End: 2022-11-28 | Stop reason: HOSPADM

## 2022-11-27 RX ORDER — LANOLIN ALCOHOL/MO/W.PET/CERES
1000 CREAM (GRAM) TOPICAL DAILY
Status: DISCONTINUED | OUTPATIENT
Start: 2022-11-28 | End: 2022-11-28 | Stop reason: HOSPADM

## 2022-11-27 RX ORDER — NITROGLYCERIN 0.4 MG/1
0.4 TABLET SUBLINGUAL
Status: DISCONTINUED | OUTPATIENT
Start: 2022-11-27 | End: 2022-11-28 | Stop reason: HOSPADM

## 2022-11-27 RX ORDER — ONDANSETRON 2 MG/ML
4 INJECTION INTRAMUSCULAR; INTRAVENOUS
Status: DISCONTINUED | OUTPATIENT
Start: 2022-11-27 | End: 2022-11-28 | Stop reason: HOSPADM

## 2022-11-27 RX ORDER — SODIUM CHLORIDE 0.9 % (FLUSH) 0.9 %
5-40 SYRINGE (ML) INJECTION AS NEEDED
Status: DISCONTINUED | OUTPATIENT
Start: 2022-11-27 | End: 2022-11-28 | Stop reason: HOSPADM

## 2022-11-27 RX ORDER — ENOXAPARIN SODIUM 100 MG/ML
40 INJECTION SUBCUTANEOUS DAILY
Status: DISCONTINUED | OUTPATIENT
Start: 2022-11-28 | End: 2022-11-28 | Stop reason: HOSPADM

## 2022-11-27 RX ORDER — ROSUVASTATIN CALCIUM 10 MG/1
40 TABLET, COATED ORAL
Status: DISCONTINUED | OUTPATIENT
Start: 2022-11-27 | End: 2022-11-28 | Stop reason: HOSPADM

## 2022-11-27 RX ORDER — ONDANSETRON 4 MG/1
4 TABLET, ORALLY DISINTEGRATING ORAL
Status: DISCONTINUED | OUTPATIENT
Start: 2022-11-27 | End: 2022-11-28 | Stop reason: HOSPADM

## 2022-11-27 RX ORDER — POLYETHYLENE GLYCOL 3350 17 G/17G
17 POWDER, FOR SOLUTION ORAL DAILY PRN
Status: DISCONTINUED | OUTPATIENT
Start: 2022-11-27 | End: 2022-11-28 | Stop reason: HOSPADM

## 2022-11-27 RX ORDER — MONTELUKAST SODIUM 10 MG/1
10 TABLET ORAL DAILY
Status: DISCONTINUED | OUTPATIENT
Start: 2022-11-28 | End: 2022-11-28 | Stop reason: HOSPADM

## 2022-11-27 RX ORDER — FAMOTIDINE 20 MG/1
20 TABLET, FILM COATED ORAL DAILY
Status: DISCONTINUED | OUTPATIENT
Start: 2022-11-28 | End: 2022-11-28 | Stop reason: HOSPADM

## 2022-11-27 RX ORDER — LORAZEPAM 0.5 MG/1
0.25 TABLET ORAL
Status: DISCONTINUED | OUTPATIENT
Start: 2022-11-27 | End: 2022-11-28 | Stop reason: HOSPADM

## 2022-11-27 RX ORDER — ASPIRIN 81 MG/1
81 TABLET ORAL DAILY
Status: DISCONTINUED | OUTPATIENT
Start: 2022-11-28 | End: 2022-11-28 | Stop reason: HOSPADM

## 2022-11-27 RX ORDER — POTASSIUM CHLORIDE 750 MG/1
10 TABLET, EXTENDED RELEASE ORAL DAILY
Status: DISCONTINUED | OUTPATIENT
Start: 2022-11-28 | End: 2022-11-28 | Stop reason: HOSPADM

## 2022-11-27 RX ORDER — ACETAMINOPHEN 650 MG/1
650 SUPPOSITORY RECTAL
Status: DISCONTINUED | OUTPATIENT
Start: 2022-11-27 | End: 2022-11-28 | Stop reason: HOSPADM

## 2022-11-27 NOTE — Clinical Note
Status[de-identified] INPATIENT [101]   Type of Bed: Telemetry [19]   Cardiac Monitoring Required?: Yes   Inpatient Hospitalization Certified Necessary for the Following Reasons: 3.  Patient receiving treatment that can only be provided in an inpatient setting (further clarification in H&P documentation)   Admitting Diagnosis: Chest pain [425397]   Admitting Physician: Dipesh Wilkinson [[id number]]   Attending Physician: Dipesh Wilkinson [[id number]]   Estimated Length of Stay: 2 Midnights   Discharge Plan[de-identified] Home with Office Follow-up

## 2022-11-28 NOTE — ED NOTES
Patient requesting to leave AMA. ED MD in to see patient and explain risks of leaving.  Patient still insists on leaving, patient signed AMA form

## 2022-11-28 NOTE — ED NOTES
Pt stated she had a concern for the cost of admission to the hospital and very politely requested to leave AMA.    Dr. Connor Tierney made aware of pt request.

## 2022-11-28 NOTE — ED PROVIDER NOTES
EMERGENCY DEPARTMENT HISTORY AND PHYSICAL EXAM    Date: 11/27/2022  Patient Name: Burt Lujan    History of Presenting Illness     Chief Complaint   Patient presents with    Chest Pain         History Provided By: Patient    8:15 PM  Burt Lujan is a 63 y.o. female with PMHX of CAD with stent who presents to the emergency department C/O chest discomfort. Patient reports the discomfort started yesterday. She describes it as a tightness in the center of her chest that went up into her neck. She reports when she had the pain yesterday did feel similar to when she needed her stent. She states is less severe today. No clear relieving or exacerbating factors. Denies any fever, cough, nausea, vomiting, shortness of breath, lower extremity edema, sick contacts, recent travel. PCP: Melvi Bagley MD    Current Outpatient Medications   Medication Sig Dispense Refill    rosuvastatin (Crestor) 40 mg tablet Take 1 Tablet by mouth nightly. 30 Tablet 0    ticagrelor (BRILINTA) 90 mg tablet Take 1 Tablet by mouth every twelve (12) hours every twelve (12) hours. 60 Tablet 0    hydrocortisone (HYTONE) 2.5 % topical cream Apply  to affected area three (3) times daily. use thin layer 30 g 0    famotidine (PEPCID) 20 mg tablet Take 20 mg by mouth daily. cyanocobalamin 1,000 mcg tablet Take 1,000 mcg by mouth daily. nitroglycerin (NITROSTAT) 0.4 mg SL tablet 1 tablet by SubLINGual route every five (5) minutes as needed for Chest Pain (total of 3 tabs for one episode of pain). 75 tablet 3    montelukast (SINGULAIR) 10 mg tablet Take 10 mg by mouth daily. LORazepam (ATIVAN) 0.5 mg tablet Take 0.25 mg by mouth nightly. Pt states she is weaning herself off;  Pt takes 1/2 and 1/4 tablet  Indications: anxious      potassium chloride SR (KLOR-CON 10) 10 mEq tablet Take 10 mEq by mouth daily. aspirin 81 mg tablet Take 81 mg by mouth daily.          Past History       Past Medical History:  Past Medical History:   Diagnosis Date    Asthma     CAD (coronary artery disease)     Chest pain, unspecified 2012    Coronary atherosclerosis of native coronary artery     Depression     Epistaxis 2012    Fatigue 2012    GERD (gastroesophageal reflux disease)     Ill-defined condition     allergies    Ill-defined condition     anal mole and cyst    Left arm pain 2014    Nausea & vomiting     Other and unspecified hyperlipidemia 2011    Other and unspecified hyperlipidemia     Ovarian cyst        Past Surgical History:  Past Surgical History:   Procedure Laterality Date    HX APPENDECTOMY      HX BREAST BIOPSY      right cyst    HX CHOLECYSTECTOMY      HX CORONARY STENT PLACEMENT  11    stent mid right coronary artery stenosis    HX GYN      dysplasia    HX HEART CATHETERIZATION      stent placement mid coronary artery    HX HYSTERECTOMY      HX KNEE ARTHROSCOPY      left    HX OOPHORECTOMY      HX TONSILLECTOMY         Family History:  Family History   Problem Relation Age of Onset    Coronary Art Dis Mother 46    Heart Attack Mother 46       Social History:  Social History     Tobacco Use    Smoking status: Former     Packs/day: 0.80     Years: 30.00     Pack years: 24.00     Types: Cigarettes     Quit date: 2011     Years since quittin.5    Smokeless tobacco: Never   Substance Use Topics    Alcohol use: No     Comment: Selvin Carpenter yearly    Drug use: No       Allergies: Allergies   Allergen Reactions    Zocor [Simvastatin] Hives    Biaxin [Clarithromycin] Other (comments)     Whelps      Ciprofloxacin Rash    Pcn [Penicillins] Rash    Plavix [Clopidogrel] Rash    Wellbutrin [Bupropion Hcl] Other (comments)     Whelps             Review of Systems   Review of Systems   Constitutional:  Negative for fever. Respiratory:  Negative for shortness of breath. Cardiovascular:  Positive for chest pain. Gastrointestinal:  Negative for abdominal pain.    All other systems reviewed and are negative.       Physical Exam     Vitals:    11/27/22 2008   BP: 118/63   Pulse: 80   Resp: 18   Temp: 96.8 °F (36 °C)   SpO2: 99%   Weight: 54.4 kg (120 lb)   Height: 5' 6\" (1.676 m)     Physical Exam    Nursing notes and vital signs reviewed    Constitutional: Non toxic appearing, moderate distress  Head: Normocephalic, Atraumatic  Eyes: EOMI  Neck: Supple  Cardiovascular: Regular rate and rhythm, no murmurs, rubs, or gallops  Chest: Normal work of breathing and chest excursion bilaterally  Lungs: Clear to ausculation bilaterally  Abdomen: Soft, non tender, non distended  Back: No evidence of trauma or deformity  Extremities: No evidence of trauma or deformity, no LE edema  Skin: Warm and dry, normal cap refill  Neuro: Alert and appropriate  Psychiatric: Normal mood and affect      Diagnostic Study Results     Labs -     Recent Results (from the past 12 hour(s))   EKG, 12 LEAD, INITIAL    Collection Time: 11/27/22  8:38 PM   Result Value Ref Range    Ventricular Rate 66 BPM    Atrial Rate 66 BPM    P-R Interval 152 ms    QRS Duration 80 ms    Q-T Interval 380 ms    QTC Calculation (Bezet) 398 ms    Calculated P Axis 67 degrees    Calculated R Axis 72 degrees    Calculated T Axis 70 degrees    Diagnosis       Normal sinus rhythm  Normal ECG  When compared with ECG of 23-JUL-2021 00:58,  No significant change was found     CBC WITH AUTOMATED DIFF    Collection Time: 11/27/22  8:40 PM   Result Value Ref Range    WBC 6.0 4.6 - 13.2 K/uL    RBC 4.45 4.20 - 5.30 M/uL    HGB 13.7 12.0 - 16.0 g/dL    HCT 41.4 35.0 - 45.0 %    MCV 93.0 78.0 - 100.0 FL    MCH 30.8 24.0 - 34.0 PG    MCHC 33.1 31.0 - 37.0 g/dL    RDW 13.5 11.6 - 14.5 %    PLATELET 729 808 - 393 K/uL    MPV 10.3 9.2 - 11.8 FL    NRBC 0.0 0  WBC    ABSOLUTE NRBC 0.00 0.00 - 0.01 K/uL    NEUTROPHILS 63 40 - 73 %    LYMPHOCYTES 26 21 - 52 %    MONOCYTES 9 3 - 10 %    EOSINOPHILS 1 0 - 5 %    BASOPHILS 1 0 - 2 %    IMMATURE GRANULOCYTES 0 0.0 - 0.5 % ABS. NEUTROPHILS 3.8 1.8 - 8.0 K/UL    ABS. LYMPHOCYTES 1.5 0.9 - 3.6 K/UL    ABS. MONOCYTES 0.6 0.05 - 1.2 K/UL    ABS. EOSINOPHILS 0.1 0.0 - 0.4 K/UL    ABS. BASOPHILS 0.0 0.0 - 0.1 K/UL    ABS. IMM. GRANS. 0.0 0.00 - 0.04 K/UL    DF AUTOMATED     D DIMER    Collection Time: 11/27/22  8:40 PM   Result Value Ref Range    D DIMER 0.37 <0.46 ug/ml(FEU)   METABOLIC PANEL, COMPREHENSIVE    Collection Time: 11/27/22  8:40 PM   Result Value Ref Range    Sodium 140 136 - 145 mmol/L    Potassium 4.3 3.5 - 5.5 mmol/L    Chloride 107 100 - 111 mmol/L    CO2 29 21 - 32 mmol/L    Anion gap 4 3.0 - 18 mmol/L    Glucose 127 (H) 74 - 99 mg/dL    BUN 18 7.0 - 18 MG/DL    Creatinine 0.90 0.6 - 1.3 MG/DL    BUN/Creatinine ratio 20 12 - 20      eGFR >60 >60 ml/min/1.73m2    Calcium 9.4 8.5 - 10.1 MG/DL    Bilirubin, total 0.3 0.2 - 1.0 MG/DL    ALT (SGPT) 32 13 - 56 U/L    AST (SGOT) 25 10 - 38 U/L    Alk. phosphatase 87 45 - 117 U/L    Protein, total 6.6 6.4 - 8.2 g/dL    Albumin 3.7 3.4 - 5.0 g/dL    Globulin 2.9 2.0 - 4.0 g/dL    A-G Ratio 1.3 0.8 - 1.7     NT-PRO BNP    Collection Time: 11/27/22  8:40 PM   Result Value Ref Range    NT pro- 0 - 900 PG/ML   TROPONIN-HIGH SENSITIVITY    Collection Time: 11/27/22  8:40 PM   Result Value Ref Range    Troponin-High Sensitivity 9 0 - 54 ng/L   MAGNESIUM    Collection Time: 11/27/22  8:40 PM   Result Value Ref Range    Magnesium 2.2 1.6 - 2.6 mg/dL       Radiologic Studies -   XR CHEST PORT   Final Result      No active cardiopulmonary disease. CT Results  (Last 48 hours)      None          CXR Results  (Last 48 hours)                 11/27/22 2037  XR CHEST PORT Final result    Impression:      No active cardiopulmonary disease. Narrative:  EXAM: CHEST RADIOGRAPH       CLINICAL INDICATION/HISTORY: chest pain     > Additional: None       COMPARISON: 6/12/2021.        TECHNIQUE: Portable frontal view of the chest       _______________       FINDINGS:       SUPPORT DEVICES: None. HEART AND MEDIASTINUM: No appreciable cardiomegaly. Remaining mediastinal   contours within normal limits. LUNGS AND PLEURAL SPACES: Clear. No consolidation, mass or effusion. Hyperexpansion of the lungs. BONY THORAX AND SOFT TISSUES: Cervical fusion. _______________                   Medications given in the ED-  Medications - No data to display      Medical Decision Making   I am the first provider for this patient. I reviewed the vital signs, available nursing notes, past medical history, past surgical history, family history and social history. Vital Signs-Reviewed the patient's vital signs. Pulse Oximetry Analysis - 99% on room air, not hypoxic     Cardiac Monitor:  Rate: 55 bpm  Rhythm: Normal Sinus    EKG interpretation: (Preliminary)  EKG read by Dr. Sofiya Barber at 8:48 PM  Normal sinus rhythm at a rate of 66 bpm, ME interval 152 ms, QRS duration of 80 ms, similar when compared to prior from July 2021    Records Reviewed: Nursing Notes, Old Medical Records, and Previous electrocardiograms    Provider Notes (Medical Decision Making): Eugenie Godfrey is a 63 y.o. female presenting for chest pain that was similar to the pain she had last year when she had her stent. Hemodynamically stable. Wells low risk, PERC positive, D-dimer negative. High risk heart score. Initial high-sensitivity troponin negative. EKG similar to prior. Discussed with cardiology and hospitalist for further in-hospital management and cardiac restratification. Patient understands and agrees with this plan. 10:15 PM  When patient had inpatient bed assigned she decided she wanted to leave. She states she cannot stay due to the cost.  I encouraged her to stay. We discussed the risks of leaving including sudden cardiac death and permanent disability. She understands the risks and states she wants to leave and will follow up with her doctors and continue to take her medications. She understands that she can return to the hospital at any time to continue her care. She has signed an [address] form. Procedures:  Procedures    ED Course:   CONSULT NOTE:   9:45 PM  Dr. Alisson Leyva spoke with Dr. Ranjan Ya   Specialty: Cardiology  Discussed pt's hx, disposition, and available diagnostic and imaging results over the telephone. Reviewed care plans. Continue aspirin and Brilinta, DVT prophylaxis Lovenox, will consult, admit to hospitalist.      9:50 PM  Updated patient on all results and plan. All questions answered. CONSULT NOTE:   10:01 PM  Dr. Alisson Leyva spoke with Dr. Dolly Johnson  Specialty: Hospitalist  Discussed pt's hx, disposition, and available diagnostic and imaging results over the telephone. Reviewed care plans. Accepts to telemetry. Diagnosis and Disposition     Critical Care Time: None    Core Measures:  For Hospitalized Patients:    1. Hospitalization Decision Time:  The decision to hospitalize the patient was made by Dr. Alisson Leyva at 9:45 PM on 11/27/2022    2. Aspirin: Aspirin was not given because the patient did not present with a stroke at the time of their Emergency Department evaluation    9:50 PM  Patient is being admitted to the hospital by Dr. Dolly Johnson. The results of their tests and reasons for their admission have been discussed with them and/or available family. They convey agreement and understanding for the need to be admitted and for their admission diagnosis. CONDITIONS ON ADMISSION:  Sepsis is not present at the time of admission. Deep Vein Thrombosis is not present at the time of admission. Thrombosis is not present at the time of admission. Urinary Tract Infection is not present at the time of admission. Pneumonia is not present at the time of admission. MRSA is not present at the time of admission. Wound infection is not present at the time of admission. Pressure Ulcer is not present at the time of admission.       CLINICAL IMPRESSION:    1. Chest pain with high risk for cardiac etiology         _______________________________      Please note that this dictation was completed with Crite[employer], the computer voice recognition software. Quite often unanticipated grammatical, syntax, homophones, and other interpretive errors are inadvertently transcribed by the computer software. Please disregard these errors. Please excuse any errors that have escaped final proofreading.

## 2022-12-30 ENCOUNTER — HOSPITAL ENCOUNTER (INPATIENT)
Age: 62
LOS: 1 days | Discharge: HOME | DRG: 287 | End: 2022-12-30
Attending: EMERGENCY MEDICINE | Admitting: FAMILY MEDICINE
Payer: COMMERCIAL

## 2022-12-30 ENCOUNTER — APPOINTMENT (OUTPATIENT)
Dept: NON INVASIVE DIAGNOSTICS | Age: 62
DRG: 287 | End: 2022-12-30
Attending: FAMILY MEDICINE
Payer: COMMERCIAL

## 2022-12-30 ENCOUNTER — APPOINTMENT (OUTPATIENT)
Dept: GENERAL RADIOLOGY | Age: 62
DRG: 287 | End: 2022-12-30
Attending: EMERGENCY MEDICINE
Payer: COMMERCIAL

## 2022-12-30 ENCOUNTER — APPOINTMENT (OUTPATIENT)
Dept: CT IMAGING | Age: 62
DRG: 287 | End: 2022-12-30
Attending: EMERGENCY MEDICINE
Payer: COMMERCIAL

## 2022-12-30 VITALS
BODY MASS INDEX: 20.09 KG/M2 | OXYGEN SATURATION: 100 % | WEIGHT: 125 LBS | TEMPERATURE: 98 F | RESPIRATION RATE: 19 BRPM | HEIGHT: 66 IN | SYSTOLIC BLOOD PRESSURE: 101 MMHG | HEART RATE: 59 BPM | DIASTOLIC BLOOD PRESSURE: 56 MMHG

## 2022-12-30 DIAGNOSIS — R07.9 CHEST PAIN: ICD-10-CM

## 2022-12-30 DIAGNOSIS — R53.81 MALAISE AND FATIGUE: ICD-10-CM

## 2022-12-30 DIAGNOSIS — Z79.01 ENCOUNTER FOR CURRENT LONG-TERM USE OF ANTICOAGULANTS: ICD-10-CM

## 2022-12-30 DIAGNOSIS — R07.9 ACUTE CHEST PAIN: Primary | ICD-10-CM

## 2022-12-30 DIAGNOSIS — R53.83 MALAISE AND FATIGUE: ICD-10-CM

## 2022-12-30 DIAGNOSIS — I25.10 CORONARY ARTERY DISEASE INVOLVING NATIVE CORONARY ARTERY OF NATIVE HEART, UNSPECIFIED WHETHER ANGINA PRESENT: ICD-10-CM

## 2022-12-30 PROBLEM — I25.2 H/O ACUTE MYOCARDIAL INFARCTION: Status: ACTIVE | Noted: 2022-12-30

## 2022-12-30 PROBLEM — Z95.5 HISTORY OF CORONARY ARTERY STENT PLACEMENT: Status: ACTIVE | Noted: 2022-12-30

## 2022-12-30 PROBLEM — I20.0 UNSTABLE ANGINA (HCC): Status: ACTIVE | Noted: 2022-12-30

## 2022-12-30 LAB
ALBUMIN SERPL-MCNC: 3.6 G/DL (ref 3.4–5)
ALBUMIN/GLOB SERPL: 1.3 (ref 0.8–1.7)
ALP SERPL-CCNC: 98 U/L (ref 45–117)
ALT SERPL-CCNC: 30 U/L (ref 13–56)
ANION GAP SERPL CALC-SCNC: 4 MMOL/L (ref 3–18)
APTT PPP: 26.5 SEC (ref 23–36.4)
APTT PPP: >180 SEC (ref 23–36.4)
AST SERPL-CCNC: 23 U/L (ref 10–38)
ATRIAL RATE: 49 BPM
ATRIAL RATE: 53 BPM
ATRIAL RATE: 62 BPM
BASOPHILS # BLD: 0 K/UL (ref 0–0.1)
BASOPHILS # BLD: 0.1 K/UL (ref 0–0.1)
BASOPHILS NFR BLD: 1 % (ref 0–2)
BASOPHILS NFR BLD: 1 % (ref 0–2)
BILIRUB SERPL-MCNC: 0.2 MG/DL (ref 0.2–1)
BNP SERPL-MCNC: 216 PG/ML (ref 0–900)
BUN SERPL-MCNC: 16 MG/DL (ref 7–18)
BUN/CREAT SERPL: 22 (ref 12–20)
CALCIUM SERPL-MCNC: 9.1 MG/DL (ref 8.5–10.1)
CALCULATED P AXIS, ECG09: 57 DEGREES
CALCULATED P AXIS, ECG09: 70 DEGREES
CALCULATED P AXIS, ECG09: 78 DEGREES
CALCULATED R AXIS, ECG10: 73 DEGREES
CALCULATED R AXIS, ECG10: 73 DEGREES
CALCULATED R AXIS, ECG10: 74 DEGREES
CALCULATED T AXIS, ECG11: 73 DEGREES
CALCULATED T AXIS, ECG11: 74 DEGREES
CALCULATED T AXIS, ECG11: 76 DEGREES
CHLORIDE SERPL-SCNC: 106 MMOL/L (ref 100–111)
CHOLEST SERPL-MCNC: 117 MG/DL
CO2 SERPL-SCNC: 29 MMOL/L (ref 21–32)
CREAT SERPL-MCNC: 0.72 MG/DL (ref 0.6–1.3)
DIAGNOSIS, 93000: NORMAL
DIFFERENTIAL METHOD BLD: ABNORMAL
DIFFERENTIAL METHOD BLD: NORMAL
ECHO AO ROOT DIAM: 2.5 CM
ECHO AO ROOT INDEX: 1.52 CM/M2
ECHO AV AREA PEAK VELOCITY: 2.4 CM2
ECHO AV AREA VTI: 2.3 CM2
ECHO AV AREA/BSA PEAK VELOCITY: 1.5 CM2/M2
ECHO AV AREA/BSA VTI: 1.4 CM2/M2
ECHO AV MEAN GRADIENT: 5 MMHG
ECHO AV MEAN VELOCITY: 1 M/S
ECHO AV PEAK GRADIENT: 9 MMHG
ECHO AV PEAK VELOCITY: 1.5 M/S
ECHO AV VELOCITY RATIO: 1
ECHO AV VTI: 37.6 CM
ECHO EST RA PRESSURE: 3 MMHG
ECHO LA DIAMETER INDEX: 1.89 CM/M2
ECHO LA DIAMETER: 3.1 CM
ECHO LA TO AORTIC ROOT RATIO: 1.24
ECHO LA VOL 2C: 28 ML (ref 22–52)
ECHO LA VOL 4C: 36 ML (ref 22–52)
ECHO LA VOL BP: 32 ML (ref 22–52)
ECHO LA VOL/BSA BIPLANE: 20 ML/M2 (ref 16–34)
ECHO LA VOLUME AREA LENGTH: 36 ML
ECHO LA VOLUME INDEX A2C: 17 ML/M2 (ref 16–34)
ECHO LA VOLUME INDEX A4C: 22 ML/M2 (ref 16–34)
ECHO LA VOLUME INDEX AREA LENGTH: 22 ML/M2 (ref 16–34)
ECHO LV E' LATERAL VELOCITY: 13 CM/S
ECHO LV E' SEPTAL VELOCITY: 10 CM/S
ECHO LV EDV A2C: 85 ML
ECHO LV EDV A4C: 65 ML
ECHO LV EDV BP: 79 ML (ref 56–104)
ECHO LV EDV INDEX A4C: 40 ML/M2
ECHO LV EDV INDEX BP: 48 ML/M2
ECHO LV EDV NDEX A2C: 52 ML/M2
ECHO LV EJECTION FRACTION A2C: 86 %
ECHO LV EJECTION FRACTION A4C: 71 %
ECHO LV EJECTION FRACTION BIPLANE: 79 % (ref 55–100)
ECHO LV ESV A2C: 12 ML
ECHO LV ESV A4C: 19 ML
ECHO LV ESV BP: 17 ML (ref 19–49)
ECHO LV ESV INDEX A2C: 7 ML/M2
ECHO LV ESV INDEX A4C: 12 ML/M2
ECHO LV ESV INDEX BP: 10 ML/M2
ECHO LV FRACTIONAL SHORTENING: 36 % (ref 28–44)
ECHO LV INTERNAL DIMENSION DIASTOLE INDEX: 2.56 CM/M2
ECHO LV INTERNAL DIMENSION DIASTOLIC: 4.2 CM (ref 3.9–5.3)
ECHO LV INTERNAL DIMENSION SYSTOLIC INDEX: 1.65 CM/M2
ECHO LV INTERNAL DIMENSION SYSTOLIC: 2.7 CM
ECHO LV IVSD: 0.9 CM (ref 0.6–0.9)
ECHO LV MASS 2D: 127.8 G (ref 67–162)
ECHO LV MASS INDEX 2D: 77.9 G/M2 (ref 43–95)
ECHO LV POSTERIOR WALL DIASTOLIC: 1 CM (ref 0.6–0.9)
ECHO LV RELATIVE WALL THICKNESS RATIO: 0.48
ECHO LVOT AREA: 2.5 CM2
ECHO LVOT AV VTI INDEX: 0.9
ECHO LVOT DIAM: 1.8 CM
ECHO LVOT MEAN GRADIENT: 4 MMHG
ECHO LVOT PEAK GRADIENT: 8 MMHG
ECHO LVOT PEAK VELOCITY: 1.5 M/S
ECHO LVOT STROKE VOLUME INDEX: 52.6 ML/M2
ECHO LVOT SV: 86.2 ML
ECHO LVOT VTI: 33.9 CM
ECHO MV A VELOCITY: 0.48 M/S
ECHO MV E DECELERATION TIME (DT): 171.5 MS
ECHO MV E VELOCITY: 0.89 M/S
ECHO MV E/A RATIO: 1.85
ECHO MV E/E' LATERAL: 6.85
ECHO MV E/E' RATIO (AVERAGED): 7.87
ECHO MV E/E' SEPTAL: 8.9
ECHO PULMONARY ARTERY SYSTOLIC PRESSURE (PASP): 27 MMHG
ECHO PV ACCELERATION TIME (AT): 54.2 MS
ECHO PV MAX VELOCITY: 0.8 M/S
ECHO PV PEAK GRADIENT: 2 MMHG
ECHO RIGHT VENTRICULAR SYSTOLIC PRESSURE (RVSP): 27 MMHG
ECHO RV INTERNAL DIMENSION: 3.3 CM
ECHO RV TAPSE: 1.8 CM (ref 1.7–?)
ECHO TV REGURGITANT MAX VELOCITY: 2.46 M/S
ECHO TV REGURGITANT PEAK GRADIENT: 24 MMHG
EOSINOPHIL # BLD: 0.1 K/UL (ref 0–0.4)
EOSINOPHIL # BLD: 0.2 K/UL (ref 0–0.4)
EOSINOPHIL NFR BLD: 2 % (ref 0–5)
EOSINOPHIL NFR BLD: 3 % (ref 0–5)
ERYTHROCYTE [DISTWIDTH] IN BLOOD BY AUTOMATED COUNT: 13.4 % (ref 11.6–14.5)
ERYTHROCYTE [DISTWIDTH] IN BLOOD BY AUTOMATED COUNT: 13.5 % (ref 11.6–14.5)
GLOBULIN SER CALC-MCNC: 2.7 G/DL (ref 2–4)
GLUCOSE SERPL-MCNC: 121 MG/DL (ref 74–99)
HCT VFR BLD AUTO: 37.2 % (ref 35–45)
HCT VFR BLD AUTO: 39.3 % (ref 35–45)
HDLC SERPL-MCNC: 69 MG/DL (ref 40–60)
HDLC SERPL: 1.7 (ref 0–5)
HGB BLD-MCNC: 12.2 G/DL (ref 12–16)
HGB BLD-MCNC: 12.8 G/DL (ref 12–16)
IMM GRANULOCYTES # BLD AUTO: 0 K/UL (ref 0–0.04)
IMM GRANULOCYTES # BLD AUTO: 0 K/UL (ref 0–0.04)
IMM GRANULOCYTES NFR BLD AUTO: 0 % (ref 0–0.5)
IMM GRANULOCYTES NFR BLD AUTO: 0 % (ref 0–0.5)
INR PPP: 0.9 (ref 0.8–1.2)
LDLC SERPL CALC-MCNC: 42.8 MG/DL (ref 0–100)
LIPID PROFILE,FLP: ABNORMAL
LYMPHOCYTES # BLD: 1.9 K/UL (ref 0.9–3.6)
LYMPHOCYTES # BLD: 2.3 K/UL (ref 0.9–3.6)
LYMPHOCYTES NFR BLD: 30 % (ref 21–52)
LYMPHOCYTES NFR BLD: 31 % (ref 21–52)
MAGNESIUM SERPL-MCNC: 2.1 MG/DL (ref 1.6–2.6)
MCH RBC QN AUTO: 30 PG (ref 24–34)
MCH RBC QN AUTO: 30.1 PG (ref 24–34)
MCHC RBC AUTO-ENTMCNC: 32.6 G/DL (ref 31–37)
MCHC RBC AUTO-ENTMCNC: 32.8 G/DL (ref 31–37)
MCV RBC AUTO: 91.9 FL (ref 78–100)
MCV RBC AUTO: 92.3 FL (ref 78–100)
MONOCYTES # BLD: 0.5 K/UL (ref 0.05–1.2)
MONOCYTES # BLD: 0.6 K/UL (ref 0.05–1.2)
MONOCYTES NFR BLD: 8 % (ref 3–10)
MONOCYTES NFR BLD: 8 % (ref 3–10)
NEUTS SEG # BLD: 3.6 K/UL (ref 1.8–8)
NEUTS SEG # BLD: 4.2 K/UL (ref 1.8–8)
NEUTS SEG NFR BLD: 58 % (ref 40–73)
NEUTS SEG NFR BLD: 58 % (ref 40–73)
NRBC # BLD: 0 K/UL (ref 0–0.01)
NRBC # BLD: 0 K/UL (ref 0–0.01)
NRBC BLD-RTO: 0 PER 100 WBC
NRBC BLD-RTO: 0 PER 100 WBC
P-R INTERVAL, ECG05: 168 MS
P-R INTERVAL, ECG05: 168 MS
P-R INTERVAL, ECG05: 178 MS
PLATELET # BLD AUTO: 214 K/UL (ref 135–420)
PLATELET # BLD AUTO: 235 K/UL (ref 135–420)
PMV BLD AUTO: 10.3 FL (ref 9.2–11.8)
PMV BLD AUTO: 10.3 FL (ref 9.2–11.8)
POTASSIUM SERPL-SCNC: 3.9 MMOL/L (ref 3.5–5.5)
PROT SERPL-MCNC: 6.3 G/DL (ref 6.4–8.2)
PROTHROMBIN TIME: 12.1 SEC (ref 11.5–15.2)
Q-T INTERVAL, ECG07: 414 MS
Q-T INTERVAL, ECG07: 422 MS
Q-T INTERVAL, ECG07: 430 MS
QRS DURATION, ECG06: 80 MS
QRS DURATION, ECG06: 82 MS
QRS DURATION, ECG06: 82 MS
QTC CALCULATION (BEZET), ECG08: 381 MS
QTC CALCULATION (BEZET), ECG08: 403 MS
QTC CALCULATION (BEZET), ECG08: 420 MS
RBC # BLD AUTO: 4.05 M/UL (ref 4.2–5.3)
RBC # BLD AUTO: 4.26 M/UL (ref 4.2–5.3)
SODIUM SERPL-SCNC: 139 MMOL/L (ref 136–145)
TRIGL SERPL-MCNC: 26 MG/DL (ref ?–150)
TROPONIN-HIGH SENSITIVITY: 8 NG/L (ref 0–54)
TROPONIN-HIGH SENSITIVITY: 9 NG/L (ref 0–54)
TROPONIN-HIGH SENSITIVITY: 9 NG/L (ref 0–54)
VENTRICULAR RATE, ECG03: 49 BPM
VENTRICULAR RATE, ECG03: 53 BPM
VENTRICULAR RATE, ECG03: 62 BPM
VLDLC SERPL CALC-MCNC: 5.2 MG/DL
WBC # BLD AUTO: 6.2 K/UL (ref 4.6–13.2)
WBC # BLD AUTO: 7.2 K/UL (ref 4.6–13.2)

## 2022-12-30 PROCEDURE — 93005 ELECTROCARDIOGRAM TRACING: CPT

## 2022-12-30 PROCEDURE — 77030010221 HC SPLNT WR POS TELE -B: Performed by: INTERNAL MEDICINE

## 2022-12-30 PROCEDURE — 99152 MOD SED SAME PHYS/QHP 5/>YRS: CPT | Performed by: INTERNAL MEDICINE

## 2022-12-30 PROCEDURE — 96376 TX/PRO/DX INJ SAME DRUG ADON: CPT

## 2022-12-30 PROCEDURE — 93458 L HRT ARTERY/VENTRICLE ANGIO: CPT | Performed by: INTERNAL MEDICINE

## 2022-12-30 PROCEDURE — 85730 THROMBOPLASTIN TIME PARTIAL: CPT

## 2022-12-30 PROCEDURE — 74011000250 HC RX REV CODE- 250: Performed by: INTERNAL MEDICINE

## 2022-12-30 PROCEDURE — B2111ZZ FLUOROSCOPY OF MULTIPLE CORONARY ARTERIES USING LOW OSMOLAR CONTRAST: ICD-10-PCS | Performed by: INTERNAL MEDICINE

## 2022-12-30 PROCEDURE — 93306 TTE W/DOPPLER COMPLETE: CPT

## 2022-12-30 PROCEDURE — 77030004522 HC CATH ANGI DX EXPO BSC -A: Performed by: INTERNAL MEDICINE

## 2022-12-30 PROCEDURE — 84484 ASSAY OF TROPONIN QUANT: CPT

## 2022-12-30 PROCEDURE — 80061 LIPID PANEL: CPT

## 2022-12-30 PROCEDURE — 99153 MOD SED SAME PHYS/QHP EA: CPT | Performed by: INTERNAL MEDICINE

## 2022-12-30 PROCEDURE — 4A023N7 MEASUREMENT OF CARDIAC SAMPLING AND PRESSURE, LEFT HEART, PERCUTANEOUS APPROACH: ICD-10-PCS | Performed by: INTERNAL MEDICINE

## 2022-12-30 PROCEDURE — 74011250636 HC RX REV CODE- 250/636: Performed by: EMERGENCY MEDICINE

## 2022-12-30 PROCEDURE — 83735 ASSAY OF MAGNESIUM: CPT

## 2022-12-30 PROCEDURE — G0378 HOSPITAL OBSERVATION PER HR: HCPCS

## 2022-12-30 PROCEDURE — 85025 COMPLETE CBC W/AUTO DIFF WBC: CPT

## 2022-12-30 PROCEDURE — 96365 THER/PROPH/DIAG IV INF INIT: CPT

## 2022-12-30 PROCEDURE — 74011250636 HC RX REV CODE- 250/636: Performed by: INTERNAL MEDICINE

## 2022-12-30 PROCEDURE — 80053 COMPREHEN METABOLIC PANEL: CPT

## 2022-12-30 PROCEDURE — 83880 ASSAY OF NATRIURETIC PEPTIDE: CPT

## 2022-12-30 PROCEDURE — C1769 GUIDE WIRE: HCPCS | Performed by: INTERNAL MEDICINE

## 2022-12-30 PROCEDURE — 71275 CT ANGIOGRAPHY CHEST: CPT

## 2022-12-30 PROCEDURE — 85610 PROTHROMBIN TIME: CPT

## 2022-12-30 PROCEDURE — 71045 X-RAY EXAM CHEST 1 VIEW: CPT

## 2022-12-30 PROCEDURE — 74011000636 HC RX REV CODE- 636: Performed by: FAMILY MEDICINE

## 2022-12-30 PROCEDURE — 77030008543 HC TBNG MON PRSS MRTM -A: Performed by: INTERNAL MEDICINE

## 2022-12-30 PROCEDURE — 74011250637 HC RX REV CODE- 250/637: Performed by: FAMILY MEDICINE

## 2022-12-30 PROCEDURE — C1894 INTRO/SHEATH, NON-LASER: HCPCS | Performed by: INTERNAL MEDICINE

## 2022-12-30 PROCEDURE — 65270000029 HC RM PRIVATE

## 2022-12-30 PROCEDURE — 74011000636 HC RX REV CODE- 636: Performed by: INTERNAL MEDICINE

## 2022-12-30 PROCEDURE — 99285 EMERGENCY DEPT VISIT HI MDM: CPT

## 2022-12-30 PROCEDURE — 77030013797 HC KT TRNSDUC PRSSR EDWD -A: Performed by: INTERNAL MEDICINE

## 2022-12-30 PROCEDURE — 96366 THER/PROPH/DIAG IV INF ADDON: CPT

## 2022-12-30 RX ORDER — SODIUM CHLORIDE 0.9 % (FLUSH) 0.9 %
5-40 SYRINGE (ML) INJECTION AS NEEDED
Status: DISCONTINUED | OUTPATIENT
Start: 2022-12-30 | End: 2022-12-30 | Stop reason: HOSPADM

## 2022-12-30 RX ORDER — ROSUVASTATIN CALCIUM 10 MG/1
40 TABLET, COATED ORAL
Status: DISCONTINUED | OUTPATIENT
Start: 2022-12-30 | End: 2022-12-30 | Stop reason: HOSPADM

## 2022-12-30 RX ORDER — HEPARIN SODIUM 1000 [USP'U]/ML
INJECTION, SOLUTION INTRAVENOUS; SUBCUTANEOUS AS NEEDED
Status: DISCONTINUED | OUTPATIENT
Start: 2022-12-30 | End: 2022-12-30 | Stop reason: HOSPADM

## 2022-12-30 RX ORDER — LIDOCAINE HYDROCHLORIDE 10 MG/ML
INJECTION INFILTRATION; PERINEURAL AS NEEDED
Status: COMPLETED | OUTPATIENT
Start: 2022-12-30 | End: 2022-12-30

## 2022-12-30 RX ORDER — ONDANSETRON 2 MG/ML
4 INJECTION INTRAMUSCULAR; INTRAVENOUS
Status: DISCONTINUED | OUTPATIENT
Start: 2022-12-30 | End: 2022-12-30 | Stop reason: HOSPADM

## 2022-12-30 RX ORDER — FAMOTIDINE 20 MG/1
20 TABLET, FILM COATED ORAL DAILY
Status: DISCONTINUED | OUTPATIENT
Start: 2022-12-30 | End: 2022-12-30 | Stop reason: HOSPADM

## 2022-12-30 RX ORDER — SODIUM CHLORIDE 0.9 % (FLUSH) 0.9 %
5-40 SYRINGE (ML) INJECTION EVERY 8 HOURS
Status: DISCONTINUED | OUTPATIENT
Start: 2022-12-30 | End: 2022-12-30 | Stop reason: HOSPADM

## 2022-12-30 RX ORDER — GUAIFENESIN 100 MG/5ML
81 LIQUID (ML) ORAL DAILY
Status: DISCONTINUED | OUTPATIENT
Start: 2022-12-30 | End: 2022-12-30 | Stop reason: HOSPADM

## 2022-12-30 RX ORDER — FENTANYL CITRATE 50 UG/ML
INJECTION, SOLUTION INTRAMUSCULAR; INTRAVENOUS AS NEEDED
Status: COMPLETED | OUTPATIENT
Start: 2022-12-30 | End: 2022-12-30

## 2022-12-30 RX ORDER — ACETAMINOPHEN 325 MG/1
650 TABLET ORAL
Status: DISCONTINUED | OUTPATIENT
Start: 2022-12-30 | End: 2022-12-30 | Stop reason: HOSPADM

## 2022-12-30 RX ORDER — HEPARIN SODIUM 200 [USP'U]/100ML
INJECTION, SOLUTION INTRAVENOUS
Status: COMPLETED | OUTPATIENT
Start: 2022-12-30 | End: 2022-12-30

## 2022-12-30 RX ORDER — ONDANSETRON 4 MG/1
4 TABLET, ORALLY DISINTEGRATING ORAL
Status: DISCONTINUED | OUTPATIENT
Start: 2022-12-30 | End: 2022-12-30 | Stop reason: HOSPADM

## 2022-12-30 RX ORDER — SODIUM CHLORIDE 0.9 % (FLUSH) 0.9 %
5-40 SYRINGE (ML) INJECTION EVERY 8 HOURS
Status: CANCELLED | OUTPATIENT
Start: 2022-12-30

## 2022-12-30 RX ORDER — HEPARIN SODIUM 10000 [USP'U]/100ML
12-25 INJECTION, SOLUTION INTRAVENOUS
Status: DISCONTINUED | OUTPATIENT
Start: 2022-12-30 | End: 2022-12-30

## 2022-12-30 RX ORDER — SODIUM CHLORIDE 0.9 % (FLUSH) 0.9 %
5-40 SYRINGE (ML) INJECTION AS NEEDED
Status: CANCELLED | OUTPATIENT
Start: 2022-12-30

## 2022-12-30 RX ORDER — HEPARIN SODIUM 1000 [USP'U]/ML
60 INJECTION, SOLUTION INTRAVENOUS; SUBCUTANEOUS ONCE
Status: COMPLETED | OUTPATIENT
Start: 2022-12-30 | End: 2022-12-30

## 2022-12-30 RX ORDER — ACETAMINOPHEN 650 MG/1
650 SUPPOSITORY RECTAL
Status: DISCONTINUED | OUTPATIENT
Start: 2022-12-30 | End: 2022-12-30 | Stop reason: HOSPADM

## 2022-12-30 RX ORDER — POLYETHYLENE GLYCOL 3350 17 G/17G
17 POWDER, FOR SOLUTION ORAL DAILY PRN
Status: DISCONTINUED | OUTPATIENT
Start: 2022-12-30 | End: 2022-12-30 | Stop reason: HOSPADM

## 2022-12-30 RX ORDER — MONTELUKAST SODIUM 10 MG/1
10 TABLET ORAL DAILY
Status: DISCONTINUED | OUTPATIENT
Start: 2022-12-30 | End: 2022-12-30 | Stop reason: HOSPADM

## 2022-12-30 RX ORDER — MIDAZOLAM HYDROCHLORIDE 1 MG/ML
INJECTION, SOLUTION INTRAMUSCULAR; INTRAVENOUS AS NEEDED
Status: COMPLETED | OUTPATIENT
Start: 2022-12-30 | End: 2022-12-30

## 2022-12-30 RX ORDER — VERAPAMIL HYDROCHLORIDE 2.5 MG/ML
INJECTION, SOLUTION INTRAVENOUS AS NEEDED
Status: COMPLETED | OUTPATIENT
Start: 2022-12-30 | End: 2022-12-30

## 2022-12-30 RX ADMIN — HEPARIN SODIUM 12 UNITS/KG/HR: 10000 INJECTION, SOLUTION INTRAVENOUS at 05:38

## 2022-12-30 RX ADMIN — TICAGRELOR 90 MG: 90 TABLET ORAL at 06:43

## 2022-12-30 RX ADMIN — HEPARIN SODIUM 3400 UNITS: 1000 INJECTION INTRAVENOUS; SUBCUTANEOUS at 05:36

## 2022-12-30 RX ADMIN — FAMOTIDINE 20 MG: 20 TABLET, FILM COATED ORAL at 08:36

## 2022-12-30 RX ADMIN — IOPAMIDOL 75 ML: 755 INJECTION, SOLUTION INTRAVENOUS at 05:27

## 2022-12-30 RX ADMIN — HEPARIN SODIUM 6 UNITS/KG/HR: 10000 INJECTION, SOLUTION INTRAVENOUS at 08:34

## 2022-12-30 RX ADMIN — ASPIRIN 81 MG: 81 TABLET, CHEWABLE ORAL at 08:36

## 2022-12-30 NOTE — DISCHARGE INSTRUCTIONS
Cardiac Catheterization/Angiography Discharge Instructions    *Check the puncture site frequently for swelling or bleeding. If you see any bleeding, lie down and apply pressure over the area with a clean towel or washcloth. Notify your doctor for any redness, swelling, drainage or oozing from the puncture site. Notify your doctor for any fever or chills. *If the leg or arm with the puncture becomes cold, numb or painful, go to the emergency room    *Activity should be limited for the next 24 hours. Avoid any strenuous activity for 24 hours. No heavy lifting (anything over 10 pounds) for five days. *Do not drive for 24 hours. *You may resume your usual diet. Drink more fluids than usual.    *Have a responsible person drive you home and stay with you for at least 24 hours after your heart catheterization/angiography. *You may remove the bandage from your Left Arm in 24 hours. You may shower in 24 hours. No tub baths, hot tubs or swimming for one week. Do not place any lotions, creams, powders, ointments over the puncture site for one week. You may place a clean band-aid over the puncture site each day for 5 days. Change this daily.

## 2022-12-30 NOTE — Clinical Note
Status[de-identified] INPATIENT [101]   Type of Bed: Telemetry [19]   Cardiac Monitoring Required?: Yes   Inpatient Hospitalization Certified Necessary for the Following Reasons: 3.  Patient receiving treatment that can only be provided in an inpatient setting (further clarification in H&P documentation)   Admitting Diagnosis: Acute chest pain [[id number]]   Admitting Diagnosis: Wanda Morgan [[id number]]   Admitting Diagnosis: CAD (coronary artery disease) [[id number]]   Admitting Diagnosis: H/O acute myocardial infarction [[id number]]   Admitting Physician: More Bush [[id number]]   Attending Physician: More Bush [[id number]]   Estimated Length of Stay: 2 Midnights   Discharge Plan[de-identified] Home with Office Follow-up

## 2022-12-30 NOTE — Clinical Note
Bilateral groin and left radial clipped prepped with ChloraPrep and draped. Wet prep solution applied at: 1325. Wet prep solution dried at: 1330. Wet prep elapsed drying time: 5 mins.

## 2022-12-30 NOTE — PROGRESS NOTES
1520 pt resting, no distress noted. Tr band removed without incident,  area covered with 2x2 and tegaderm. Neuro vasc assessment of left hand and arm WNL.     1600 pt up to bathroom to void,  attempting with find a ride home

## 2022-12-30 NOTE — PROGRESS NOTES
1420  pt to care unit post cardiac cath,  pt aaox3,  denies any pain or distress,  tr band intact to left wrist area,  neuro vasc assessment of left and and wrist WNL

## 2022-12-30 NOTE — Clinical Note
TRANSFER - OUT REPORT:     Verbal report given to: Shannon Traore. Report consisted of patient's Situation, Background, Assessment and   Recommendations(SBAR). Opportunity for questions and clarification was provided. Patient transported with a Registered Nurse. Patient transported to: holding area.

## 2022-12-30 NOTE — DISCHARGE SUMMARY
Discharge Summary  Subjective:       Admit Date: 12/30/2022  Discharge Date:  12/30/2022, 2:53 PM    Discharging Physician: Precious Daley pager [phone]  Primary Care Provider:  Kareen Tobar MD    Patient ID:  Hi Dias  63 y.o. female  1960    Reason for Admission:  Acute chest pain [R07.9]  Malaise [R53.81]  CAD (coronary artery disease) [I25.10]  H/O acute myocardial infarction [I25.2]    Discharge Diagnosis:   - chest pain, likely musculoskeletal  - CAD post PCI x 3  - HTN    Patient Active Problem List   Diagnosis Code    GERD (gastroesophageal reflux disease) K21.9    Depression F32. A    Coronary atherosclerosis of native coronary artery I25.10    Hyperlipidemia E78.5    Epistaxis R04.0    Fatigue R53.83    Left arm pain M79.602    Chest pain R07.9    NSTEMI (non-ST elevated myocardial infarction) (HCC) I21.4    Asthma J45.909    Paroxysmal atrial fibrillation (HCC) I48.0    Tobacco use Z72.0    History of coronary artery stent placement Z95.5    Unstable angina (HCC) I20.0    CAD (coronary artery disease) I25.10    Malaise R53.81    Acute chest pain R07.9    H/O acute myocardial infarction I25.2       Consultants:    cardiology    Hospital Course:   Reason for admission ( Admitting HPI): \" Hi Dias is a 63 y.o. female with CAD, hyperlipidemia, prior tobacco use, asthma, paroxysmal atrial fibrillation, and prior cardiac stent placements presents to the ED for a month of ongoing chest tightness. She was seen initially but left AMA prior to bed assignment. Her symptoms are worse with exercise. She denies fever, nausea/vomiting/diarrhea, or leg swelling. \"    She was admitted to the hospitalist service w cardiology consultation. She had LHC which she tolerated w out stent occlusion or other lesions. She is stable for DC w outpt follow up w her PCP and cardiology.     Pertinent Imaging/ Operative procedures:   LHC: \"  CORONARY ANGIOGRAPHY [[id number] (Custom)] LHC and Coronary angiogram done via left radial approach. Coronary angiogram revealed patent stent in LAD, RCA and LCx     LV gram was not done. LVEDP 5 mm hg. No significant gradient on pull back. Patient tolerated procedure well. Scant blood loss. No complications. No specimen removed. Recommendation:     Medication considered:   Aspirin, beta blocker, ACEI, Nitrates and statin      CAD risk factor education  Diet education  Control cholesterol  Blood pressure control  Exercise education: Age and functional status appropriate. Consider evaluation for other sources of reported symptoms. No driving for 24 hours   No weight lifting no more than 10 lbs from left hand for 1 week  Follow up in cardiology clinic in 4-6 weeks  Plan discussed with patient. Plan discussed with Prasanna Yang \"          Pertinent Results:    CMP:   Lab Results   Component Value Date/Time     12/30/2022 01:30 AM    K 3.9 12/30/2022 01:30 AM     12/30/2022 01:30 AM    CO2 29 12/30/2022 01:30 AM    AGAP 4 12/30/2022 01:30 AM     (H) 12/30/2022 01:30 AM    BUN 16 12/30/2022 01:30 AM    CREA 0.72 12/30/2022 01:30 AM    CA 9.1 12/30/2022 01:30 AM    MG 2.1 12/30/2022 01:30 AM    ALB 3.6 12/30/2022 01:30 AM    TP 6.3 (L) 12/30/2022 01:30 AM    GLOB 2.7 12/30/2022 01:30 AM    AGRAT 1.3 12/30/2022 01:30 AM    ALT 30 12/30/2022 01:30 AM     CBC:   Lab Results   Component Value Date/Time    WBC 6.2 12/30/2022 04:45 AM    HGB 12.2 12/30/2022 04:45 AM    HCT 37.2 12/30/2022 04:45 AM     12/30/2022 04:45 AM     Physical Exam on Discharge:  Visit Vitals  /80   Pulse 61   Temp 97.6 °F (36.4 °C)   Resp 29   Ht 5' 6\" (1.676 m)   Wt 56.7 kg (125 lb)   SpO2 97%   BMI 20.18 kg/m²     Body mass index is 20.18 kg/m².   GEN: NAD  HEART:S1S2  NEURO: nonfocal   Condition at discharge: stable    Discharge Medications  Current Discharge Medication List        CONTINUE these medications which have NOT CHANGED    Details   rosuvastatin (Crestor) 40 mg tablet Take 1 Tablet by mouth nightly. Qty: 30 Tablet, Refills: 0      ticagrelor (BRILINTA) 90 mg tablet Take 1 Tablet by mouth every twelve (12) hours every twelve (12) hours. Qty: 60 Tablet, Refills: 0      hydrocortisone (HYTONE) 2.5 % topical cream Apply  to affected area three (3) times daily. use thin layer  Qty: 30 g, Refills: 0      famotidine (PEPCID) 20 mg tablet Take 20 mg by mouth daily. cyanocobalamin 1,000 mcg tablet Take 1,000 mcg by mouth daily. nitroglycerin (NITROSTAT) 0.4 mg SL tablet 1 tablet by SubLINGual route every five (5) minutes as needed for Chest Pain (total of 3 tabs for one episode of pain). Qty: 75 tablet, Refills: 3      montelukast (SINGULAIR) 10 mg tablet Take 10 mg by mouth daily. LORazepam (ATIVAN) 0.5 mg tablet Take 0.25 mg by mouth nightly. Pt states she is weaning herself off;  Pt takes 1/2 and 1/4 tablet  Indications: anxious      potassium chloride SR (KLOR-CON 10) 10 mEq tablet Take 10 mEq by mouth daily. aspirin 81 mg tablet Take 81 mg by mouth daily.              Disposition: home   Follow up:  pcp, cardiology  Restrictions: no driving x 1 day, no lifting> 10 # for 7 days    Diagnostic Imaging/Labs pending at discharge: none      Alexander Pedro, [address] [facility]  Internal Medicine/Geriatrics    Time Spent none with >50% coordination of care          CC: Kat Simental MD

## 2022-12-30 NOTE — Clinical Note
TRANSFER - IN REPORT:     Verbal report received from: ER NURSE. Report consisted of patient's Situation, Background, Assessment and   Recommendations(SBAR). Opportunity for questions and clarification was provided. Assessment completed upon patient's arrival to unit and care assumed. Patient transported with a Registered Nurse.

## 2022-12-30 NOTE — H&P
Date of Surgery Update:  Roger John was seen and examined. History and physical has been reviewed. The patient has been examined. There have been no significant clinical changes since the completion of the originally dated History and Physical.      Patient assessed and is candidate for moderate sedation.       Signed By: Kaveh Denton MD     December 30, 2022 1:35 PM

## 2022-12-30 NOTE — PROCEDURES
LHC and Coronary angiogram done via left radial approach. Coronary angiogram revealed patent stent in LAD, RCA and LCx    LV gram was not done. LVEDP 5 mm hg. No significant gradient on pull back. Patient tolerated procedure well. Scant blood loss. No complications. No specimen removed. Recommendation:    Medication considered:   Aspirin, beta blocker, ACEI, Nitrates and statin     CAD risk factor education  Diet education  Control cholesterol  Blood pressure control  Exercise education: Age and functional status appropriate. Consider evaluation for other sources of reported symptoms. No driving for 24 hours   No weight lifting no more than 10 lbs from left hand for 1 week  Follow up in cardiology clinic in 4-6 weeks  Plan discussed with patient.   Plan discussed with Isaac Ellington

## 2022-12-30 NOTE — ED PROVIDER NOTES
EMERGENCY DEPARTMENT HISTORY AND PHYSICAL EXAM    Date: 12/30/2022  Patient Name: Claudeen Nimrod    History of Presenting Illness     Chief Complaint   Patient presents with    Chest Pain     Pt states that she has been having chest tightness for about a month. Pt states that it is constant. Pt was here last month for the same thing, denied admission. Pt states that the pain has not gone away since then. History Provided By: Patient    Additional History (Context):   2 :03 AM  Claudeen Nimrod is a 63 y.o. female with PMHX of extensive history of coronary artery disease, family history of premature heart disease, reflux, asthma who presents to the emergency department C/O chest pain. Patient states she is been having symptoms of chest pain or chest tightness for the last month. Symptoms are often brought on by exertion currently at this timeframe she does not have pain. She states ever since she had her last cardiac stent she has not been feeling well. She states she has been compliant with medications. She states over the last month after same thing her symptoms she finally presented for evaluation of this. At this very moment she is calm relaxed without having any symptoms of distress. Social History  No smoking drinking or drugs    Family History  Positive family history of premature heart disease.       PCP: Nicolás Blackmon MD    Current Facility-Administered Medications   Medication Dose Route Frequency Provider Last Rate Last Admin    heparin 25,000 units in D5W 250 ml infusion  12-25 Units/kg/hr IntraVENous TITRATE Candida Mehta MD   Stopped at 12/30/22 5622    aspirin chewable tablet 81 mg  81 mg Oral DAILY Belinda Estrada MD        famotidine (PEPCID) tablet 20 mg  20 mg Oral DAILY Belinda Estrada MD        montelukast (SINGULAIR) tablet 10 mg  10 mg Oral DAILY Belinda Estrada MD        rosuvastatin (CRESTOR) tablet 40 mg  40 mg Oral QHS Abi Giang MD WHIT        ticagrelor (BRILINTA) tablet 90 mg  90 mg Oral Q12H Amarilis Fox MD   90 mg at 12/30/22 0140    sodium chloride (NS) flush 5-40 mL  5-40 mL IntraVENous Q8H Mac SHERMAN MD        sodium chloride (NS) flush 5-40 mL  5-40 mL IntraVENous PRN Amarilis Fox MD        acetaminophen (TYLENOL) tablet 650 mg  650 mg Oral Q6H PRN Amarilis Fox MD        Or    acetaminophen (TYLENOL) suppository 650 mg  650 mg Rectal Q6H PRN Amarilis Fox MD        polyethylene glycol (MIRALAX) packet 17 g  17 g Oral DAILY PRN Amarilis Fox MD        ondansetron (ZOFRAN ODT) tablet 4 mg  4 mg Oral Q8H PRN Amarilis Fox MD        Or    ondansetron [facility]) injection 4 mg  4 mg IntraVENous Q6H PRN Amarilis Fox MD         Current Outpatient Medications   Medication Sig Dispense Refill    rosuvastatin (Crestor) 40 mg tablet Take 1 Tablet by mouth nightly. 30 Tablet 0    ticagrelor (BRILINTA) 90 mg tablet Take 1 Tablet by mouth every twelve (12) hours every twelve (12) hours. 60 Tablet 0    hydrocortisone (HYTONE) 2.5 % topical cream Apply  to affected area three (3) times daily. use thin layer 30 g 0    famotidine (PEPCID) 20 mg tablet Take 20 mg by mouth daily. cyanocobalamin 1,000 mcg tablet Take 1,000 mcg by mouth daily. nitroglycerin (NITROSTAT) 0.4 mg SL tablet 1 tablet by SubLINGual route every five (5) minutes as needed for Chest Pain (total of 3 tabs for one episode of pain). 75 tablet 3    montelukast (SINGULAIR) 10 mg tablet Take 10 mg by mouth daily. LORazepam (ATIVAN) 0.5 mg tablet Take 0.25 mg by mouth nightly. Pt states she is weaning herself off;  Pt takes 1/2 and 1/4 tablet  Indications: anxious      potassium chloride SR (KLOR-CON 10) 10 mEq tablet Take 10 mEq by mouth daily. aspirin 81 mg tablet Take 81 mg by mouth daily.          Past History     Past Medical History:  Past Medical History:   Diagnosis Date    Asthma     CAD (coronary artery disease)     Chest pain, unspecified 2012    Coronary atherosclerosis of native coronary artery     Depression     Epistaxis 2012    Fatigue 2012    GERD (gastroesophageal reflux disease)     Ill-defined condition     allergies    Ill-defined condition     anal mole and cyst    Left arm pain 2014    Nausea & vomiting     Other and unspecified hyperlipidemia 2011    Other and unspecified hyperlipidemia     Ovarian cyst        Past Surgical History:  Past Surgical History:   Procedure Laterality Date    HX APPENDECTOMY      HX BREAST BIOPSY      right cyst    HX CHOLECYSTECTOMY      HX CORONARY STENT PLACEMENT  11    stent mid right coronary artery stenosis    HX GYN      dysplasia    HX HEART CATHETERIZATION      stent placement mid coronary artery    HX HYSTERECTOMY      HX KNEE ARTHROSCOPY      left    HX OOPHORECTOMY      HX TONSILLECTOMY         Family History:  Family History   Problem Relation Age of Onset    Coronary Art Dis Mother 46    Heart Attack Mother 46       Social History:  Social History     Tobacco Use    Smoking status: Former     Packs/day: 0.80     Years: 30.00     Pack years: 24.00     Types: Cigarettes     Quit date: 2011     Years since quittin.6    Smokeless tobacco: Never   Substance Use Topics    Alcohol use: No     Comment: Calos Alfaro yearly    Drug use: No       Allergies: Allergies   Allergen Reactions    Zocor [Simvastatin] Hives    Biaxin [Clarithromycin] Other (comments)     Whelps      Ciprofloxacin Rash    Pcn [Penicillins] Rash    Plavix [Clopidogrel] Rash    Wellbutrin [Bupropion Hcl] Other (comments)     Whelps             Review of Systems   Review of Systems   Constitutional:  Positive for fatigue. Generalized feeling of unwellness. Cardiovascular:  Positive for chest pain. Musculoskeletal:  Positive for myalgias. All other systems reviewed and are negative.     Physical Exam     Vitals:    22 3083 12/30/22 0312 12/30/22 0540 12/30/22 0634   BP: 126/66 (!) 107/57 136/65 115/61   Pulse: (!) 56 (!) 53 63 (!) 50   Resp: 23 15     Temp:       SpO2: 99% 97% 99% 98%   Weight:       Height:         Physical Exam  Vitals and nursing note reviewed. Constitutional:       General: She is not in acute distress. Appearance: She is well-developed. She is not diaphoretic. HENT:      Head: Normocephalic and atraumatic. Eyes:      General: No scleral icterus. Extraocular Movements:      Right eye: Normal extraocular motion. Left eye: Normal extraocular motion. Conjunctiva/sclera: Conjunctivae normal.      Pupils: Pupils are equal, round, and reactive to light. Neck:      Trachea: No tracheal deviation. Cardiovascular:      Rate and Rhythm: Regular rhythm. Bradycardia present. Heart sounds: Normal heart sounds. Comments: Borderline bradycardia  Pulmonary:      Effort: Pulmonary effort is normal. No respiratory distress. Breath sounds: Normal breath sounds. No stridor. Abdominal:      General: Bowel sounds are normal. There is no distension. Palpations: Abdomen is soft. Tenderness: There is no abdominal tenderness. There is no rebound. Musculoskeletal:         General: No tenderness. Normal range of motion. Cervical back: Normal range of motion and neck supple. Comments: Grossly unremarkable without abnormalities   Skin:     General: Skin is warm and dry. Capillary Refill: Capillary refill takes less than 2 seconds. Findings: No erythema or rash. Neurological:      Mental Status: She is alert and oriented to person, place, and time. GCS: GCS eye subscore is 4. GCS verbal subscore is 5. GCS motor subscore is 6. Cranial Nerves: No cranial nerve deficit. Motor: No weakness. Deep Tendon Reflexes: Reflexes are normal and symmetric.    Psychiatric:         Mood and Affect: Mood normal.         Behavior: Behavior normal.         Thought Content: Thought content normal.         Judgment: Judgment normal.     Diagnostic Study Results     Labs -  Recent Results (from the past 24 hour(s))   EKG, 12 LEAD, INITIAL    Collection Time: 12/30/22  1:22 AM   Result Value Ref Range    Ventricular Rate 49 BPM    Atrial Rate 49 BPM    P-R Interval 168 ms    QRS Duration 82 ms    Q-T Interval 422 ms    QTC Calculation (Bezet) 381 ms    Calculated P Axis 57 degrees    Calculated R Axis 73 degrees    Calculated T Axis 74 degrees    Diagnosis       ** ** ** Critical Test Result: STEMI  Sinus bradycardia  Anterior infarct , possibly acute  ** ** ACUTE MI / STEMI ** **  Abnormal ECG  When compared with ECG of 27-NOV-2022 20:38,  No significant change was found     CBC WITH AUTOMATED DIFF    Collection Time: 12/30/22  1:30 AM   Result Value Ref Range    WBC 7.2 4.6 - 13.2 K/uL    RBC 4.26 4.20 - 5.30 M/uL    HGB 12.8 12.0 - 16.0 g/dL    HCT 39.3 35.0 - 45.0 %    MCV 92.3 78.0 - 100.0 FL    MCH 30.0 24.0 - 34.0 PG    MCHC 32.6 31.0 - 37.0 g/dL    RDW 13.4 11.6 - 14.5 %    PLATELET 212 533 - 814 K/uL    MPV 10.3 9.2 - 11.8 FL    NRBC 0.0 0  WBC    ABSOLUTE NRBC 0.00 0.00 - 0.01 K/uL    NEUTROPHILS 58 40 - 73 %    LYMPHOCYTES 31 21 - 52 %    MONOCYTES 8 3 - 10 %    EOSINOPHILS 2 0 - 5 %    BASOPHILS 1 0 - 2 %    IMMATURE GRANULOCYTES 0 0.0 - 0.5 %    ABS. NEUTROPHILS 4.2 1.8 - 8.0 K/UL    ABS. LYMPHOCYTES 2.3 0.9 - 3.6 K/UL    ABS. MONOCYTES 0.6 0.05 - 1.2 K/UL    ABS. EOSINOPHILS 0.1 0.0 - 0.4 K/UL    ABS. BASOPHILS 0.0 0.0 - 0.1 K/UL    ABS. IMM.  GRANS. 0.0 0.00 - 0.04 K/UL    DF AUTOMATED     METABOLIC PANEL, COMPREHENSIVE    Collection Time: 12/30/22  1:30 AM   Result Value Ref Range    Sodium 139 136 - 145 mmol/L    Potassium 3.9 3.5 - 5.5 mmol/L    Chloride 106 100 - 111 mmol/L    CO2 29 21 - 32 mmol/L    Anion gap 4 3.0 - 18 mmol/L    Glucose 121 (H) 74 - 99 mg/dL    BUN 16 7.0 - 18 MG/DL    Creatinine 0.72 0.6 - 1.3 MG/DL    BUN/Creatinine ratio 22 (H) 12 - 20      eGFR >60 >60 ml/min/1.73m2    Calcium 9.1 8.5 - 10.1 MG/DL    Bilirubin, total 0.2 0.2 - 1.0 MG/DL    ALT (SGPT) 30 13 - 56 U/L    AST (SGOT) 23 10 - 38 U/L    Alk. phosphatase 98 45 - 117 U/L    Protein, total 6.3 (L) 6.4 - 8.2 g/dL    Albumin 3.6 3.4 - 5.0 g/dL    Globulin 2.7 2.0 - 4.0 g/dL    A-G Ratio 1.3 0.8 - 1.7     TROPONIN-HIGH SENSITIVITY    Collection Time: 12/30/22  1:30 AM   Result Value Ref Range    Troponin-High Sensitivity 8 0 - 54 ng/L   MAGNESIUM    Collection Time: 12/30/22  1:30 AM   Result Value Ref Range    Magnesium 2.1 1.6 - 2.6 mg/dL   NT-PRO BNP    Collection Time: 12/30/22  1:30 AM   Result Value Ref Range    NT pro- 0 - 900 PG/ML   PROTHROMBIN TIME + INR    Collection Time: 12/30/22  1:30 AM   Result Value Ref Range    Prothrombin time 12.1 11.5 - 15.2 sec    INR 0.9 0.8 - 1.2     PTT    Collection Time: 12/30/22  1:30 AM   Result Value Ref Range    aPTT 26.5 23.0 - 36.4 SEC   PTT    Collection Time: 12/30/22  4:45 AM   Result Value Ref Range    aPTT >180.0 (HH) 23.0 - 36.4 SEC   CBC WITH AUTOMATED DIFF    Collection Time: 12/30/22  4:45 AM   Result Value Ref Range    WBC 6.2 4.6 - 13.2 K/uL    RBC 4.05 (L) 4.20 - 5.30 M/uL    HGB 12.2 12.0 - 16.0 g/dL    HCT 37.2 35.0 - 45.0 %    MCV 91.9 78.0 - 100.0 FL    MCH 30.1 24.0 - 34.0 PG    MCHC 32.8 31.0 - 37.0 g/dL    RDW 13.5 11.6 - 14.5 %    PLATELET 411 208 - 111 K/uL    MPV 10.3 9.2 - 11.8 FL    NRBC 0.0 0  WBC    ABSOLUTE NRBC 0.00 0.00 - 0.01 K/uL    NEUTROPHILS 58 40 - 73 %    LYMPHOCYTES 30 21 - 52 %    MONOCYTES 8 3 - 10 %    EOSINOPHILS 3 0 - 5 %    BASOPHILS 1 0 - 2 %    IMMATURE GRANULOCYTES 0 0.0 - 0.5 %    ABS. NEUTROPHILS 3.6 1.8 - 8.0 K/UL    ABS. LYMPHOCYTES 1.9 0.9 - 3.6 K/UL    ABS. MONOCYTES 0.5 0.05 - 1.2 K/UL    ABS. EOSINOPHILS 0.2 0.0 - 0.4 K/UL    ABS. BASOPHILS 0.1 0.0 - 0.1 K/UL    ABS. IMM.  GRANS. 0.0 0.00 - 0.04 K/UL    DF AUTOMATED     TROPONIN-HIGH SENSITIVITY    Collection Time: 12/30/22 4:45 AM   Result Value Ref Range    Troponin-High Sensitivity 9 0 - 54 ng/L   EKG, 12 LEAD, SUBSEQUENT    Collection Time: 12/30/22  4:49 AM   Result Value Ref Range    Ventricular Rate 53 BPM    Atrial Rate 53 BPM    P-R Interval 178 ms    QRS Duration 82 ms    Q-T Interval 430 ms    QTC Calculation (Bezet) 403 ms    Calculated P Axis 70 degrees    Calculated R Axis 73 degrees    Calculated T Axis 76 degrees    Diagnosis       Sinus bradycardia  Otherwise normal ECG  When compared with ECG of 30-DEC-2022 01:24,  No significant change was found     TROPONIN-HIGH SENSITIVITY    Collection Time: 12/30/22  4:55 AM   Result Value Ref Range    Troponin-High Sensitivity 9 0 - 54 ng/L   ECHO ADULT COMPLETE    Collection Time: 12/30/22  6:12 AM   Result Value Ref Range    IVSd 0.9 0.6 - 0.9 cm    LVIDd 4.2 3.9 - 5.3 cm    LVIDs 2.7 cm    LVOT Diameter 1.8 cm    LVPWd 1.0 (A) 0.6 - 0.9 cm    EF BP 79 55 - 100 %    LV Ejection Fraction A2C 86 %    LV Ejection Fraction A4C 71 %    LV EDV A2C 85 mL    LV EDV A4C 65 mL    LV EDV BP 79 56 - 104 mL    LV ESV A2C 12 mL    LV ESV A4C 19 mL    LV ESV BP 17 (A) 19 - 49 mL    LVOT Peak Gradient 8 mmHg    LVOT Mean Gradient 4 mmHg    LVOT SV 84.8 ml    LVOT Peak Velocity 1.5 m/s    LVOT VTI 33.9 cm    RVIDd 3.3 cm    LA Diameter 3.1 cm    LA Volume A/L 36 mL    LA Volume 2C 28 22 - 52 mL    LA Volume 4C 36 22 - 52 mL    LA Volume BP 32 22 - 52 mL    AV Area by Peak Velocity 2.4 cm2    AV Area by VTI 2.3 cm2    AV Peak Gradient 9 mmHg    AV Mean Gradient 5 mmHg    AV Peak Velocity 1.5 m/s    AV Mean Velocity 1.0 m/s    AV VTI 37.6 cm    MV A Velocity 0.48 m/s    MV E Wave Deceleration Time 171.5 ms    MV E Velocity 0.89 m/s    LV E' Lateral Velocity 13 cm/s    LV E' Septal Velocity 10 cm/s    PV AT 54.2 ms    PV Peak Gradient 2 mmHg    PV Max Velocity 0.8 m/s    TAPSE 1.8 1.7 cm    TR Peak Gradient 24 mmHg    TR Max Velocity 2.46 m/s    Aortic Root 2.5 cm        Radiologic Studies -  CTA CHEST W OR W WO CONT   Final Result      1. No pulmonary embolism identified. 2. Atherosclerosis including the coronary arteries. 3. Pulmonary hyperinflation, emphysema and bronchial wall thickening suggest   bronchitis/COPD. 4. Additional findings as detailed in the body of the report. XR CHEST PORT   Final Result      1. Hyperinflated lungs may be due to inspiratory effort versus air trapping   (asthma/COPD). 2.  No new consolidation, pleural effusion, or pneumothorax. CT Results  (Last 48 hours)                 12/30/22 0527  CTA CHEST W OR W WO CONT Final result    Impression:      1. No pulmonary embolism identified. 2. Atherosclerosis including the coronary arteries. 3. Pulmonary hyperinflation, emphysema and bronchial wall thickening suggest   bronchitis/COPD. 4. Additional findings as detailed in the body of the report. Narrative:  EXAM: CTA chest       CLINICAL INDICATION/HISTORY: Chest pressure. Patient reports one month of   intermittent chest tightness, now constant. Clinical concern for pulmonary   embolism. COMPARISON: None       TECHNIQUE: Axial CT imaging from the thoracic inlet through the diaphragm with   intravenous contrast. Coronal and sagittal MIP reformats were generated. One or   more dose reduction techniques were used on this CT: automated exposure control,   adjustment of the mAs and/or kVp according to patient size, and iterative   reconstruction techniques. The specific techniques used on this CT exam have   been documented in the patient's electronic medical record. Digital Imaging and   Communications in Medicine (DICOM) format image data are available to   nonaffiliated external healthcare facilities or entities on a secured, media   free, reciprocally searchable basis with patient authorization for at least a   12-month period after this study. _______________       FINDINGS:       EXAM QUALITY: Adequate contrast bolus. Respiratory motion limits evaluation. PULMONARY ARTERIES: No large central pulmonary embolism is visualized in the   main, lobar, or proximal segmental pulmonary arteries; evaluation of the more   distal branches is limited. MEDIASTINUM: Atherosclerosis including the coronary arteries Normal heart size. RV/LV ratio is normal with no evidence of right heart strain. Reflux of contrast   into the IVC and hepatic veins may be related to contrast bolus versus right   heart dysfunction. Aorta is normal caliber. No pericardial effusion. LUNGS: Lungs are hyperinflated. Emphysema. No suspicious nodule or mass. AIRWAY: Mild bronchial wall thickening. PLEURA: Normal.       LYMPH NODES: No enlarged nodes. UPPER ABDOMEN: Cholecystectomy. Moderate burden of atherosclerotic plaque around   the aorta and branch vessels notably at the main abdominal ostia. BONES: No acute or aggressive osseous abnormalities identified. Partially imaged   ACDF hardware. OTHER: None.       _______________                 CXR Results  (Last 48 hours)                 12/30/22 0157  XR CHEST PORT Final result    Impression:      1. Hyperinflated lungs may be due to inspiratory effort versus air trapping   (asthma/COPD). 2.  No new consolidation, pleural effusion, or pneumothorax. Narrative:  EXAM: PORTABLE  FRONTAL CHEST RADIOGRAPH       CLINICAL INDICATION/HISTORY: Chest pain, history of gastroesophageal reflux and   CAD       COMPARISON: 11/27/2022       TECHNIQUE: Portable frontal view of the chest       _______________       FINDINGS:       SUPPORT DEVICES: None. HEART AND MEDIASTINUM: Normal heart size and mediastinal contours. Atherosclerosis       LUNGS: Lungs are hyperinflated with similar reticular lung markings. No   suspicious pulmonary opacities. PLEURAL SPACES:No large pneumothorax. No large pleural effusion. BONY THORAX AND SOFT TISSUES: No acute abnormality. Subtle S-shaped curvature of   the thoracolumbar spine partially imaged ACDF hardware.       _______________                   Medications given in the ED-  Medications   heparin 25,000 units in D5W 250 ml infusion (0 Units/kg/hr × 56.7 kg IntraVENous Stopped 12/30/22 0728)   aspirin chewable tablet 81 mg (has no administration in time range)   famotidine (PEPCID) tablet 20 mg (has no administration in time range)   montelukast (SINGULAIR) tablet 10 mg (has no administration in time range)   rosuvastatin (CRESTOR) tablet 40 mg (has no administration in time range)   ticagrelor (BRILINTA) tablet 90 mg (90 mg Oral Given 12/30/22 0643)   sodium chloride (NS) flush 5-40 mL ( IntraVENous Canceled Entry 12/30/22 0628)   sodium chloride (NS) flush 5-40 mL (has no administration in time range)   acetaminophen (TYLENOL) tablet 650 mg (has no administration in time range)     Or   acetaminophen (TYLENOL) suppository 650 mg (has no administration in time range)   polyethylene glycol (MIRALAX) packet 17 g (has no administration in time range)   ondansetron (ZOFRAN ODT) tablet 4 mg (has no administration in time range)     Or   ondansetron (ZOFRAN) injection 4 mg (has no administration in time range)   heparin (porcine) 1,000 unit/mL injection 3,400 Units (3,400 Units IntraVENous Given 12/30/22 0536)   iopamidoL (ISOVUE-370) 370 mg iodine /mL (76 %) injection 1-75 mL (75 mL IntraVENous Given 12/30/22 0527)         Medical Decision Making   I am the first provider for this patient. I reviewed the vital signs, available nursing notes, past medical history, past surgical history, family history and social history. Vital Signs-Reviewed the patient's vital signs. Pulse Oximetry Analysis -98% on room air    Cardiac Monitor:  Rate: 58 bpm  Rhythm: Sinus bradycardia    EKG  #1 interpretation: (Preliminary)  1:22 AM    Sinus bradycardia, rate 49, Q waves noted in anterior septal leads, QTC is 381.   EKG read by Ute Edge, MD    EKG  #2 interpretation: (Preliminary)  1:24 AM    Normal sinus rhythm, rate 62, Q waves noted to inferior leads, QTC is 420. EKG read by Teri Acuna MD    EKG  #3 interpretation: (Preliminary)  4:49 AM    Sinus bradycardia, rate 53, Q waves in anterior septal leads, QTC is 403. EKG read by Teri Acuna MD      Records Reviewed: NURSING NOTES AND PREVIOUS MEDICAL RECORDS    Provider Notes (Medical Decision Making):   Patient with vague history of chest pain in addition to malaise and generalized feeling of unwellness. She has extensive cardiovascular disease and a high risk for significant symptoms. She notes that exertion makes her 5year-old more fatigue malaise. For these reasons EKGs done to me upon arrival blood work 1 of which was reading by computer to be STEMI. She is not having acute pain and while she does have a strain pattern this does not look to have any signs of reciprocal changes. Case was eventually reviewed with cardiologist as a troponins are also coming back unremarkable. She is not having current chest pain at this timeframe. We discussed her condition and her allergies to Plavix and we will keep her on Brilinta and aspirin. Additionally we will add heparin run serial enzymes following for manifestations of ACS in addition to CTA for pulmonary embolism or other acute pulmonary process. Unlikely this is pneumonia pneumothorax or other process. No evidence of underlying cardiac arrhythmia. Symptomatic bradycardia feasible but unlikely for her cause of symptoms. She will be admitted to the hospitalist service on heparin drip    Procedures:  Procedures    ED Course:   8:31 AM: Initial assessment performed. The patients presenting problems have been discussed, and they are in agreement with the care plan formulated and outlined with them. I have encouraged them to ask questions as they arise throughout their visit.     CONSULT NOTE:   4:31 AM  Teri Acuna MD spoke with Dr. Jihan Lopez  Specialty: Cardiology  Discussed pt's hx, disposition, and available diagnostic and imaging results  over the telephone. Reviewed care plans. Consulting physician agrees with plans as outlined. Given symptoms were to be appropriate for cardiac rule out beginning heparin drip additionally running serial enzymes and CTA for pulmonary embolism other intrathoracic sources. Thoracic aortic dissection is possible but very unlikely. .      CONSULT NOTE:   11:05 AM  Cesar Chan MD   spoke with Dr. Jose R Dennis  Specialty: Hospitalist  Discussed pt's hx, disposition, and available diagnostic and imaging results agrees with current management symptoms will admit for cardiac sources. Patient should be appropriate for telemetry. . Reviewed care plans. Consulting physician agrees with plans as outlined. .          Diagnosis and Disposition     Critical Care Time: 55 minutes  CRITICAL CARE NOTE:  6:27 AM  I have spent 55 minutes of critical care time involved in lab review, consultations with specialist, family decision-making, and documentation, not including separate billable procedures. During this entire length of time I was immediately available to the patient. Critical Care: The reason for providing this level of medical care for this critically ill patient was due a critical illness that impaired one or more vital organ systems such that there was a high probability of imminent or life threatening deterioration in the patients condition. This care involved high complexity decision making to assess, manipulate, and support vital system functions, to treat this degreee vital organ system failure and to prevent further life threatening deterioration of the patients condition. Core Measures:  For Hospitalized Patients:    1. Hospitalization Decision Time:  The decision to hospitalize the patient was made by Cesar Chan MD   at 3:15 AM on 12/30/2022    2.  Aspirin: Aspirin was given    5:02 AM  Patient is being admitted to the hospital by Dr. Troy Harrell. The results of their tests and reasons for their admission have been discussed with them and/or available family. They convey agreement and understanding for the need to be admitted and for their admission diagnosis. CONDITIONS ON ADMISSION:  Sepsis is not present at the time of admission. Deep Vein Thrombosis is not present at the time of admission. Thrombosis is not present at the time of admission. Urinary Tract Infection is not present at the time of admission. Pneumonia is not present at the time of admission. MRSA is not present at the time of admission. Wound infection is not present at the time of admission. Pressure Ulcer is not present at the time of admission. CLINICAL IMPRESSION:    1. Acute chest pain    2. Malaise and fatigue    3. Coronary artery disease involving native coronary artery of native heart, unspecified whether angina present    4. Encounter for current long-term use of anticoagulants      _______________________________    This note was partially transcribed via voice recognition software. Although efforts have been made to catch any discrepancies, it may contain sound alike words, grammatical errors, or nonsensical words.

## 2022-12-30 NOTE — H&P
History & Physical    Patient: Nathan Charles MRN: [id number]  CSN: [id number]    YOB: 1960  Age: 63 y.o. Sex: female      DOA: 12/30/2022  Primary Care Provider:  Adalgisa Stewart MD      Assessment/Plan     Patient Active Problem List   Diagnosis Code    GERD (gastroesophageal reflux disease) K21.9    Depression F32. A    Coronary atherosclerosis of native coronary artery I25.10    Hyperlipidemia E78.5    Epistaxis R04.0    Fatigue R53.83    Left arm pain M79.602    Chest pain R07.9    NSTEMI (non-ST elevated myocardial infarction) ([facility]) I21.4    Asthma J45.909    Paroxysmal atrial fibrillation ([facility]) I48.0    Tobacco use Z72.0    History of coronary artery stent placement Z95.5    Unstable angina ([facility]) I210     74-year-old female with CAD, hyperlipidemia, prior tobacco use, asthma, paroxysmal atrial fibrillation, and prior cardiac stent placements is admitted for chest tightness and unstable angina. She had similar symptoms last month but left AMA before bed assignment. Chest pain with CAD and 3 prior stents placed-no acute coronary syndrome but unstable angina  -Telemetry  -Trend troponins  -Echocardiogram  -Cardiology consult for likely cardiac catheterization  -Heparin drip  -Follow-up lipid panel     Full code     Prophylaxis: Pepcid p.o., heparin drip     Estimated length of stay : 3 nights     MD Darrell Hoyosist  ------------------------------------------------------------------------------------------------------------------------------------------------------------------------------------------------------------------------------------------------------  CC: chest tightness       HPI:     Nathan Charles is a 63 y.o. female with CAD, hyperlipidemia, prior tobacco use, asthma, paroxysmal atrial fibrillation, and prior cardiac stent placements presents to the ED for a month of ongoing chest tightness. She was seen initially but left AMA prior to bed assignment. Her symptoms are worse with exercise. She denies fever, nausea/vomiting/diarrhea, or leg swelling. Past Medical History:   Diagnosis Date    Asthma     CAD (coronary artery disease)     Chest pain, unspecified 2012    Coronary atherosclerosis of native coronary artery     Depression     Epistaxis 2012    Fatigue 2012    GERD (gastroesophageal reflux disease)     Ill-defined condition     allergies    Ill-defined condition     anal mole and cyst    Left arm pain 2014    Nausea & vomiting     Other and unspecified hyperlipidemia 2011    Other and unspecified hyperlipidemia     Ovarian cyst        Past Surgical History:   Procedure Laterality Date    HX APPENDECTOMY      HX BREAST BIOPSY      right cyst    HX CHOLECYSTECTOMY      HX CORONARY STENT PLACEMENT  11    stent mid right coronary artery stenosis    HX GYN      dysplasia    HX HEART CATHETERIZATION      stent placement mid coronary artery    HX HYSTERECTOMY      HX KNEE ARTHROSCOPY      left    HX OOPHORECTOMY      HX TONSILLECTOMY         Family History   Problem Relation Age of Onset    Coronary Art Dis Mother 46    Heart Attack Mother 46       Social History     Socioeconomic History    Marital status:    Tobacco Use    Smoking status: Former     Packs/day: 0.80     Years: 30.00     Pack years: 24.00     Types: Cigarettes     Quit date: 2011     Years since quittin.6    Smokeless tobacco: Never   Substance and Sexual Activity    Alcohol use: No     Comment: Brian Kinney yearly    Drug use: No       Prior to Admission medications    Medication Sig Start Date End Date Taking? Authorizing Provider   rosuvastatin (Crestor) 40 mg tablet Take 1 Tablet by mouth nightly. 21   Jasen Crews MD   ticagrelor (BRILINTA) 90 mg tablet Take 1 Tablet by mouth every twelve (12) hours every twelve (12) hours.  21   Jasen Crews MD   hydrocortisone (HYTONE) 2.5 % topical cream Apply  to affected area three (3) times daily. use thin layer 6/14/21   Jerome Crews MD   famotidine (PEPCID) 20 mg tablet Take 20 mg by mouth daily. Provider, Historical   cyanocobalamin 1,000 mcg tablet Take 1,000 mcg by mouth daily. Other, MD Mikaela   nitroglycerin (NITROSTAT) 0.4 mg SL tablet 1 tablet by SubLINGual route every five (5) minutes as needed for Chest Pain (total of 3 tabs for one episode of pain). 12/16/14   Eddie Lara MD   montelukast (SINGULAIR) 10 mg tablet Take 10 mg by mouth daily. Provider, Historical   LORazepam (ATIVAN) 0.5 mg tablet Take 0.25 mg by mouth nightly. Pt states she is weaning herself off;  Pt takes 1/2 and 1/4 tablet  Indications: anxious    Provider, Historical   potassium chloride SR (KLOR-CON 10) 10 mEq tablet Take 10 mEq by mouth daily. Provider, Historical   aspirin 81 mg tablet Take 81 mg by mouth daily. 5/10/11   Provider, Historical       Allergies   Allergen Reactions    Zocor [Simvastatin] Hives    Biaxin [Clarithromycin] Other (comments)     Whelps      Ciprofloxacin Rash    Pcn [Penicillins] Rash    Plavix [Clopidogrel] Rash    Wellbutrin [Bupropion Hcl] Other (comments)     Whelps           Review of Systems  Gen: No fever, chills, malaise, weight loss/gain. Heent: No headache, rhinorrhea, sore throat. Heart: +chest pain, palpitations, AYALA, pnd, or orthopnea. Resp: No cough, hemoptysis, wheezing and shortness of breath. GI: No nausea, vomiting, diarrhea, constipation, melena or hematochezia. : No urinary obstruction, dysuria or hematuria. Musc/skeletal: no bone or joint complaints. Vasc: No edema, cyanosis or claudication. Endo: No heat/cold intolerance, no polyuria,polydipsia or polyphagia. Neuro: No unilateral weakness, numbness, tingling. No seizures. Heme: No easy bruising or bleeding.           Physical Exam:     Physical Exam:  Visit Vitals  /65   Pulse 63   Temp 97.7 °F (36.5 °C)   Resp 15   Ht 5' 6\" (1.676 m)   Wt 56.7 kg (125 lb)   SpO2 99%   BMI 20.18 kg/m²           Temp (24hrs), Av.7 °F (36.5 °C), Min:97.7 °F (36.5 °C), Max:97.7 °F (36.5 °C)    No intake/output data recorded. No intake/output data recorded. General:  Awake, cooperative, no distress. Head:  Normocephalic, without obvious abnormality, atraumatic. Eyes:  Conjunctivae/corneas clear, sclera anicteric. Nose: Nares normal. No drainage or sinus tenderness. Throat: Lips, mucosa, and tongue normal.    Neck: Supple, symmetrical, trachea midline. Lungs:   Clear to auscultation bilaterally. Heart:  Regular rate and rhythm, S1, S2 normal, no murmur, click, rub or gallop. Abdomen: Soft, non-tender. Bowel sounds normal. No masses,  No organomegaly. Extremities: Extremities normal, atraumatic, no cyanosis or edema. Capillary refill normal.   Pulses: 2+ and symmetric all extremities. Skin: Skin color pink, turgor normal. No rashes or lesions   Neurologic: No focal motor or sensory deficit. Labs Reviewed: All lab results for the last 24 hours reviewed.   Recent Results (from the past 24 hour(s))   EKG, 12 LEAD, INITIAL    Collection Time: 22  1:22 AM   Result Value Ref Range    Ventricular Rate 49 BPM    Atrial Rate 49 BPM    P-R Interval 168 ms    QRS Duration 82 ms    Q-T Interval 422 ms    QTC Calculation (Bezet) 381 ms    Calculated P Axis 57 degrees    Calculated R Axis 73 degrees    Calculated T Axis 74 degrees    Diagnosis        Critical Test Result: STEMI  Sinus bradycardia  Anterior infarct , possibly acute  ACUTE MI / STEMI  Abnormal ECG  When compared with ECG of 2022 20:38,  No significant change was found     CBC WITH AUTOMATED DIFF    Collection Time: 22  1:30 AM   Result Value Ref Range    WBC 7.2 4.6 - 13.2 K/uL    RBC 4.26 4.20 - 5.30 M/uL    HGB 12.8 12.0 - 16.0 g/dL    HCT 39.3 35.0 - 45.0 %    MCV 92.3 78.0 - 100.0 FL    MCH 30.0 24.0 - 34.0 PG    MCHC 32.6 31.0 - 37.0 g/dL    RDW 13.4 11.6 - 14.5 % PLATELET 824 925 - 475 K/uL    MPV 10.3 9.2 - 11.8 FL    NRBC 0.0 0  WBC    ABSOLUTE NRBC 0.00 0.00 - 0.01 K/uL    NEUTROPHILS 58 40 - 73 %    LYMPHOCYTES 31 21 - 52 %    MONOCYTES 8 3 - 10 %    EOSINOPHILS 2 0 - 5 %    BASOPHILS 1 0 - 2 %    IMMATURE GRANULOCYTES 0 0.0 - 0.5 %    ABS. NEUTROPHILS 4.2 1.8 - 8.0 K/UL    ABS. LYMPHOCYTES 2.3 0.9 - 3.6 K/UL    ABS. MONOCYTES 0.6 0.05 - 1.2 K/UL    ABS. EOSINOPHILS 0.1 0.0 - 0.4 K/UL    ABS. BASOPHILS 0.0 0.0 - 0.1 K/UL    ABS. IMM. GRANS. 0.0 0.00 - 0.04 K/UL    DF AUTOMATED     METABOLIC PANEL, COMPREHENSIVE    Collection Time: 12/30/22  1:30 AM   Result Value Ref Range    Sodium 139 136 - 145 mmol/L    Potassium 3.9 3.5 - 5.5 mmol/L    Chloride 106 100 - 111 mmol/L    CO2 29 21 - 32 mmol/L    Anion gap 4 3.0 - 18 mmol/L    Glucose 121 (H) 74 - 99 mg/dL    BUN 16 7.0 - 18 MG/DL    Creatinine 0.72 0.6 - 1.3 MG/DL    BUN/Creatinine ratio 22 (H) 12 - 20      eGFR >60 >60 ml/min/1.73m2    Calcium 9.1 8.5 - 10.1 MG/DL    Bilirubin, total 0.2 0.2 - 1.0 MG/DL    ALT (SGPT) 30 13 - 56 U/L    AST (SGOT) 23 10 - 38 U/L    Alk.  phosphatase 98 45 - 117 U/L    Protein, total 6.3 (L) 6.4 - 8.2 g/dL    Albumin 3.6 3.4 - 5.0 g/dL    Globulin 2.7 2.0 - 4.0 g/dL    A-G Ratio 1.3 0.8 - 1.7     TROPONIN-HIGH SENSITIVITY    Collection Time: 12/30/22  1:30 AM   Result Value Ref Range    Troponin-High Sensitivity 8 0 - 54 ng/L   MAGNESIUM    Collection Time: 12/30/22  1:30 AM   Result Value Ref Range    Magnesium 2.1 1.6 - 2.6 mg/dL   NT-PRO BNP    Collection Time: 12/30/22  1:30 AM   Result Value Ref Range    NT pro- 0 - 900 PG/ML   PROTHROMBIN TIME + INR    Collection Time: 12/30/22  1:30 AM   Result Value Ref Range    Prothrombin time 12.1 11.5 - 15.2 sec    INR 0.9 0.8 - 1.2     PTT    Collection Time: 12/30/22  1:30 AM   Result Value Ref Range    aPTT 26.5 23.0 - 36.4 SEC   EKG, 12 LEAD, SUBSEQUENT    Collection Time: 12/30/22  4:49 AM   Result Value Ref Range Ventricular Rate 53 BPM    Atrial Rate 53 BPM    P-R Interval 178 ms    QRS Duration 82 ms    Q-T Interval 430 ms    QTC Calculation (Bezet) 403 ms    Calculated P Axis 70 degrees    Calculated R Axis 73 degrees    Calculated T Axis 76 degrees    Diagnosis       Sinus bradycardia  Otherwise normal ECG  When compared with ECG of 30-DEC-2022 01:24,  No significant change was found     TROPONIN-HIGH SENSITIVITY    Collection Time: 12/30/22  4:55 AM   Result Value Ref Range    Troponin-High Sensitivity 9 0 - 54 ng/L   Results  XR CHEST PORT (Accession [id number]) (Order [id number])  Allergies       Medium: Zocor [Simvastatin]   Not Specified: Biaxin [Clarithromycin]; Ciprofloxacin;  Pcn [Penicillins]; Plavix [Clopidogrel]; Wellbutrin [Bupropion Hcl]     Exam Information    Status Exam Begun  Exam Ended    Final [99] 12/30/2022 01:26 12/30/2022  1:57 AM [id number]  1:57 AM     Result Information    Status: Final result (Exam End: 12/30/2022 01:57) Provider Status: Open       XR CHEST PORT: Patient Communication     Released  Not seen     Study Result    Narrative & Impression   EXAM: PORTABLE  FRONTAL CHEST RADIOGRAPH     CLINICAL INDICATION/HISTORY: Chest pain, history of gastroesophageal reflux and  CAD     COMPARISON: 11/27/2022     TECHNIQUE: Portable frontal view of the chest     _______________     FINDINGS:     SUPPORT DEVICES: None. HEART AND MEDIASTINUM: Normal heart size and mediastinal contours. Atherosclerosis     LUNGS: Lungs are hyperinflated with similar reticular lung markings. No  suspicious pulmonary opacities. PLEURAL SPACES:No large pneumothorax. No large pleural effusion. BONY THORAX AND SOFT TISSUES: No acute abnormality. Subtle S-shaped curvature of  the thoracolumbar spine partially imaged ACDF hardware.     _______________     IMPRESSION     1. Hyperinflated lungs may be due to inspiratory effort versus air trapping  (asthma/COPD).   2.  No new consolidation, pleural effusion, or pneumothorax.          CTA pending

## 2022-12-30 NOTE — PROGRESS NOTES
To home via uber, dressing to l wrist clean dry and intact,  pt without complaints at time of discharge

## 2022-12-30 NOTE — ED NOTES
Pt states that she has been having chest tightness for about a month. Pt states that it is constant. Pt was here last month for the same thing, denied admission. Pt states that the pain has not gone away since then.

## 2022-12-30 NOTE — Clinical Note
Contrast Dose Calculator:   Patient's age: 63.   Patient's sex: Female. Patient weight (kg) = 56.7. Creatinine level (mg/dL) = 0.7. Creatinine clearance (mL/min): 76.5. Max Contrast dose per Creatinine Cl calculator = 172.13 mL.

## 2023-01-02 LAB
ATRIAL RATE: 53 BPM
ATRIAL RATE: 62 BPM
CALCULATED P AXIS, ECG09: 70 DEGREES
CALCULATED P AXIS, ECG09: 78 DEGREES
CALCULATED R AXIS, ECG10: 73 DEGREES
CALCULATED R AXIS, ECG10: 74 DEGREES
CALCULATED T AXIS, ECG11: 73 DEGREES
CALCULATED T AXIS, ECG11: 76 DEGREES
DIAGNOSIS, 93000: NORMAL
DIAGNOSIS, 93000: NORMAL
P-R INTERVAL, ECG05: 168 MS
P-R INTERVAL, ECG05: 178 MS
Q-T INTERVAL, ECG07: 414 MS
Q-T INTERVAL, ECG07: 430 MS
QRS DURATION, ECG06: 80 MS
QRS DURATION, ECG06: 82 MS
QTC CALCULATION (BEZET), ECG08: 403 MS
QTC CALCULATION (BEZET), ECG08: 420 MS
VENTRICULAR RATE, ECG03: 53 BPM
VENTRICULAR RATE, ECG03: 62 BPM

## 2023-01-03 LAB
ATRIAL RATE: 49 BPM
CALCULATED P AXIS, ECG09: 57 DEGREES
CALCULATED R AXIS, ECG10: 73 DEGREES
CALCULATED T AXIS, ECG11: 74 DEGREES
DIAGNOSIS, 93000: NORMAL
P-R INTERVAL, ECG05: 168 MS
Q-T INTERVAL, ECG07: 422 MS
QRS DURATION, ECG06: 82 MS
QTC CALCULATION (BEZET), ECG08: 381 MS
VENTRICULAR RATE, ECG03: 49 BPM

## (undated) DEVICE — GLIDESHEATH SLENDER STAINLESS STEEL KIT: Brand: GLIDESHEATH SLENDER

## (undated) DEVICE — Device

## (undated) DEVICE — BAND RADIAL COMPR ARTERY 24CM -- REG BX/10

## (undated) DEVICE — ANGIOGRAPHIC CATHETER: Brand: EXPO™

## (undated) DEVICE — STOPCOCK TRNSDUC 500PSI 3 W ROT M LUER LT BLU OFF HNDL R

## (undated) DEVICE — SHIELD RAD 14X16 IN W/ SCOOP ABSORBER

## (undated) DEVICE — PRESSURE MONITORING SET: Brand: TRUWAVE

## (undated) DEVICE — SENSOR PLSE OXMTR AD CBL L36IN ADH FRM FIT SPO2 DISP

## (undated) DEVICE — GUIDEWIRE VASC L260CM DIA0.035IN RAD 3MM J TIP L7CM PTFE

## (undated) DEVICE — MINI TREK CORONARY DILATATION CATHETER 2.0 MM X 15 MM / RAPID-EXCHANGE: Brand: MINI TREK

## (undated) DEVICE — TUBING PRSS MON L24IN PVC RIG NONEXPANDING M TO FEM CONN

## (undated) DEVICE — DRAPE,ANGIO,BRACH,STERILE,38X44: Brand: MEDLINE

## (undated) DEVICE — CATH DIAG FR4 EXPO 5FRX100CM -- EXPO

## (undated) DEVICE — PACK PROCEDURE SURG CATH CUST

## (undated) DEVICE — SPLINT WR POS F/ARTERIAL ACC -- BX/10

## (undated) DEVICE — NC TREK™ CORONARY DILATATION CATHETER 2.25 MM X 12 MM / RAPID-EXCHANGE: Brand: NC TREK™

## (undated) DEVICE — PROCEDURE KIT FLUID MGMT 10 FR CUST MAINFOLD

## (undated) DEVICE — PROCEDURE KIT 30 ATM 20 CC BLEEDBK CTRL COPILOT 20/30

## (undated) DEVICE — HI-TORQUE WHISPER ES GUIDE WIRE .014 STRAIGHTTIP 3.0 CM X 190 CM: Brand: HI-TORQUE WHISPER

## (undated) DEVICE — CATH GUID COR EB30 6FR 100CM -- LAUNCHER

## (undated) DEVICE — BAND COMPR L21CM SHT CLR PLAS HEMSTAT EXT HK AND LOOP RETEN

## (undated) DEVICE — HEARTSTREAM ADULT/CHILD RADIOLUCENT PADS

## (undated) DEVICE — TORCON NB, ADVANTAGE CATHETER: Brand: TORCON NB